# Patient Record
Sex: MALE | Race: WHITE | Employment: FULL TIME | URBAN - METROPOLITAN AREA
[De-identification: names, ages, dates, MRNs, and addresses within clinical notes are randomized per-mention and may not be internally consistent; named-entity substitution may affect disease eponyms.]

---

## 2017-12-13 PROBLEM — E66.9 OBESITY (BMI 30-39.9): Status: ACTIVE | Noted: 2017-12-13

## 2017-12-13 PROBLEM — E78.00 HYPERCHOLESTEROLEMIA: Status: ACTIVE | Noted: 2017-12-13

## 2017-12-13 PROBLEM — I10 ESSENTIAL HYPERTENSION: Status: ACTIVE | Noted: 2017-12-13

## 2018-01-29 ENCOUNTER — APPOINTMENT (OUTPATIENT)
Dept: GENERAL RADIOLOGY | Age: 53
End: 2018-01-29
Attending: ORTHOPAEDIC SURGERY
Payer: COMMERCIAL

## 2018-01-29 ENCOUNTER — ANESTHESIA EVENT (OUTPATIENT)
Dept: SURGERY | Age: 53
End: 2018-01-29
Payer: COMMERCIAL

## 2018-01-29 ENCOUNTER — ANESTHESIA (OUTPATIENT)
Dept: SURGERY | Age: 53
End: 2018-01-29
Payer: COMMERCIAL

## 2018-01-29 ENCOUNTER — HOSPITAL ENCOUNTER (OUTPATIENT)
Age: 53
Setting detail: OUTPATIENT SURGERY
Discharge: HOME | End: 2018-01-29
Attending: ORTHOPAEDIC SURGERY | Admitting: ORTHOPAEDIC SURGERY
Payer: COMMERCIAL

## 2018-01-29 VITALS
WEIGHT: 284.6 LBS | OXYGEN SATURATION: 93 % | TEMPERATURE: 98 F | HEIGHT: 71 IN | SYSTOLIC BLOOD PRESSURE: 104 MMHG | BODY MASS INDEX: 39.84 KG/M2 | HEART RATE: 77 BPM | RESPIRATION RATE: 16 BRPM | DIASTOLIC BLOOD PRESSURE: 58 MMHG

## 2018-01-29 DIAGNOSIS — S62.102A CLOSED FRACTURE OF LEFT WRIST, INITIAL ENCOUNTER: Primary | ICD-10-CM

## 2018-01-29 LAB — GLUCOSE BLD STRIP.AUTO-MCNC: 122 MG/DL (ref 65–100)

## 2018-01-29 PROCEDURE — 77030002933 HC SUT MCRYL J&J -A: Performed by: ORTHOPAEDIC SURGERY

## 2018-01-29 PROCEDURE — 76942 ECHO GUIDE FOR BIOPSY: CPT | Performed by: ORTHOPAEDIC SURGERY

## 2018-01-29 PROCEDURE — C1713 ANCHOR/SCREW BN/BN,TIS/BN: HCPCS | Performed by: ORTHOPAEDIC SURGERY

## 2018-01-29 PROCEDURE — 74011000250 HC RX REV CODE- 250

## 2018-01-29 PROCEDURE — 74011250636 HC RX REV CODE- 250/636

## 2018-01-29 PROCEDURE — 76210000021 HC REC RM PH II 0.5 TO 1 HR: Performed by: ORTHOPAEDIC SURGERY

## 2018-01-29 PROCEDURE — 76010000161 HC OR TIME 1 TO 1.5 HR INTENSV-TIER 1: Performed by: ORTHOPAEDIC SURGERY

## 2018-01-29 PROCEDURE — 77030003602 HC NDL NRV BLK BBMI -B: Performed by: ANESTHESIOLOGY

## 2018-01-29 PROCEDURE — 77030000032 HC CUF TRNQT ZIMM -B: Performed by: ORTHOPAEDIC SURGERY

## 2018-01-29 PROCEDURE — 77030032490 HC SLV COMPR SCD KNE COVD -B: Performed by: ORTHOPAEDIC SURGERY

## 2018-01-29 PROCEDURE — 74011250636 HC RX REV CODE- 250/636: Performed by: ANESTHESIOLOGY

## 2018-01-29 PROCEDURE — 77030002916 HC SUT ETHLN J&J -A: Performed by: ORTHOPAEDIC SURGERY

## 2018-01-29 PROCEDURE — 77030018836 HC SOL IRR NACL ICUM -A: Performed by: ORTHOPAEDIC SURGERY

## 2018-01-29 PROCEDURE — 77030020143 HC AIRWY LARYN INTUB CGAS -A: Performed by: ANESTHESIOLOGY

## 2018-01-29 PROCEDURE — 77030000031 HC BIT DRL QC SYNT -C: Performed by: ORTHOPAEDIC SURGERY

## 2018-01-29 PROCEDURE — 74011250637 HC RX REV CODE- 250/637: Performed by: ANESTHESIOLOGY

## 2018-01-29 PROCEDURE — 82962 GLUCOSE BLOOD TEST: CPT

## 2018-01-29 PROCEDURE — 76010010054 HC POST OP PAIN BLOCK: Performed by: ORTHOPAEDIC SURGERY

## 2018-01-29 PROCEDURE — 74011250636 HC RX REV CODE- 250/636: Performed by: NURSE PRACTITIONER

## 2018-01-29 PROCEDURE — 77030011884 HC TAPE CST PLSTR BSNM -A: Performed by: ORTHOPAEDIC SURGERY

## 2018-01-29 PROCEDURE — 76060000034 HC ANESTHESIA 1.5 TO 2 HR: Performed by: ORTHOPAEDIC SURGERY

## 2018-01-29 PROCEDURE — 73100 X-RAY EXAM OF WRIST: CPT

## 2018-01-29 PROCEDURE — 76210000006 HC OR PH I REC 0.5 TO 1 HR: Performed by: ORTHOPAEDIC SURGERY

## 2018-01-29 PROCEDURE — 77030011640 HC PAD GRND REM COVD -A: Performed by: ORTHOPAEDIC SURGERY

## 2018-01-29 DEVICE — SCREW BNE L20MM DIA2.4MM S STL ST LOK FULL THRD T8 STARDRV: Type: IMPLANTABLE DEVICE | Site: RADIUS | Status: FUNCTIONAL

## 2018-01-29 DEVICE — SCREW BNE L24MM DIA2.4MM S STL ST LOK FULL THRD T8 STARDRV: Type: IMPLANTABLE DEVICE | Site: RADIUS | Status: FUNCTIONAL

## 2018-01-29 DEVICE — SCREW BNE L16MM DIA2.4MM S STL ST LOK FULL THRD T8 STARDRV: Type: IMPLANTABLE DEVICE | Site: RADIUS | Status: FUNCTIONAL

## 2018-01-29 DEVICE — SCREW BNE L22MM DIA2.4MM S STL ST LOK FULL THRD T8 STARDRV: Type: IMPLANTABLE DEVICE | Site: RADIUS | Status: FUNCTIONAL

## 2018-01-29 DEVICE — IMPLANTABLE DEVICE: Type: IMPLANTABLE DEVICE | Site: RADIUS | Status: FUNCTIONAL

## 2018-01-29 RX ORDER — HYDROMORPHONE HYDROCHLORIDE 2 MG/ML
0.5 INJECTION, SOLUTION INTRAMUSCULAR; INTRAVENOUS; SUBCUTANEOUS
Status: DISCONTINUED | OUTPATIENT
Start: 2018-01-29 | End: 2018-01-29 | Stop reason: HOSPADM

## 2018-01-29 RX ORDER — SODIUM CHLORIDE 0.9 % (FLUSH) 0.9 %
5-10 SYRINGE (ML) INJECTION AS NEEDED
Status: DISCONTINUED | OUTPATIENT
Start: 2018-01-29 | End: 2018-01-29 | Stop reason: HOSPADM

## 2018-01-29 RX ORDER — OXYCODONE HYDROCHLORIDE 5 MG/1
10 TABLET ORAL
Status: DISCONTINUED | OUTPATIENT
Start: 2018-01-29 | End: 2018-01-29 | Stop reason: HOSPADM

## 2018-01-29 RX ORDER — PROPOFOL 10 MG/ML
INJECTION, EMULSION INTRAVENOUS AS NEEDED
Status: DISCONTINUED | OUTPATIENT
Start: 2018-01-29 | End: 2018-01-29 | Stop reason: HOSPADM

## 2018-01-29 RX ORDER — SODIUM CHLORIDE, SODIUM LACTATE, POTASSIUM CHLORIDE, CALCIUM CHLORIDE 600; 310; 30; 20 MG/100ML; MG/100ML; MG/100ML; MG/100ML
100 INJECTION, SOLUTION INTRAVENOUS CONTINUOUS
Status: DISCONTINUED | OUTPATIENT
Start: 2018-01-29 | End: 2018-01-29 | Stop reason: HOSPADM

## 2018-01-29 RX ORDER — FENTANYL CITRATE 50 UG/ML
100 INJECTION, SOLUTION INTRAMUSCULAR; INTRAVENOUS ONCE
Status: COMPLETED | OUTPATIENT
Start: 2018-01-29 | End: 2018-01-29

## 2018-01-29 RX ORDER — MIDAZOLAM HYDROCHLORIDE 1 MG/ML
2 INJECTION, SOLUTION INTRAMUSCULAR; INTRAVENOUS
Status: COMPLETED | OUTPATIENT
Start: 2018-01-29 | End: 2018-01-29

## 2018-01-29 RX ORDER — SODIUM CHLORIDE 0.9 % (FLUSH) 0.9 %
5-10 SYRINGE (ML) INJECTION EVERY 8 HOURS
Status: DISCONTINUED | OUTPATIENT
Start: 2018-01-29 | End: 2018-01-29 | Stop reason: HOSPADM

## 2018-01-29 RX ORDER — LIDOCAINE HYDROCHLORIDE 10 MG/ML
0.3 INJECTION INFILTRATION; PERINEURAL ONCE
Status: DISCONTINUED | OUTPATIENT
Start: 2018-01-29 | End: 2018-01-29 | Stop reason: HOSPADM

## 2018-01-29 RX ORDER — FENTANYL CITRATE 50 UG/ML
INJECTION, SOLUTION INTRAMUSCULAR; INTRAVENOUS AS NEEDED
Status: DISCONTINUED | OUTPATIENT
Start: 2018-01-29 | End: 2018-01-29 | Stop reason: HOSPADM

## 2018-01-29 RX ORDER — LIDOCAINE HYDROCHLORIDE 20 MG/ML
INJECTION, SOLUTION EPIDURAL; INFILTRATION; INTRACAUDAL; PERINEURAL AS NEEDED
Status: DISCONTINUED | OUTPATIENT
Start: 2018-01-29 | End: 2018-01-29 | Stop reason: HOSPADM

## 2018-01-29 RX ORDER — CEFAZOLIN SODIUM/WATER 2 G/20 ML
2 SYRINGE (ML) INTRAVENOUS
Status: COMPLETED | OUTPATIENT
Start: 2018-01-29 | End: 2018-01-29

## 2018-01-29 RX ORDER — ONDANSETRON 2 MG/ML
INJECTION INTRAMUSCULAR; INTRAVENOUS AS NEEDED
Status: DISCONTINUED | OUTPATIENT
Start: 2018-01-29 | End: 2018-01-29 | Stop reason: HOSPADM

## 2018-01-29 RX ORDER — ACETAMINOPHEN 500 MG
1000 TABLET ORAL
Status: COMPLETED | OUTPATIENT
Start: 2018-01-29 | End: 2018-01-29

## 2018-01-29 RX ORDER — SODIUM CHLORIDE, SODIUM LACTATE, POTASSIUM CHLORIDE, CALCIUM CHLORIDE 600; 310; 30; 20 MG/100ML; MG/100ML; MG/100ML; MG/100ML
75 INJECTION, SOLUTION INTRAVENOUS
Status: DISCONTINUED | OUTPATIENT
Start: 2018-01-29 | End: 2018-01-29 | Stop reason: HOSPADM

## 2018-01-29 RX ORDER — FAMOTIDINE 20 MG/1
20 TABLET, FILM COATED ORAL ONCE
Status: COMPLETED | OUTPATIENT
Start: 2018-01-29 | End: 2018-01-29

## 2018-01-29 RX ADMIN — Medication 3 G: at 12:49

## 2018-01-29 RX ADMIN — PROPOFOL 350 MG: 10 INJECTION, EMULSION INTRAVENOUS at 12:52

## 2018-01-29 RX ADMIN — ONDANSETRON 4 MG: 2 INJECTION INTRAMUSCULAR; INTRAVENOUS at 13:55

## 2018-01-29 RX ADMIN — MIDAZOLAM HYDROCHLORIDE 2 MG: 1 INJECTION, SOLUTION INTRAMUSCULAR; INTRAVENOUS at 11:18

## 2018-01-29 RX ADMIN — ACETAMINOPHEN 1000 MG: 500 TABLET, FILM COATED ORAL at 14:51

## 2018-01-29 RX ADMIN — FENTANYL CITRATE 25 MCG: 50 INJECTION, SOLUTION INTRAMUSCULAR; INTRAVENOUS at 13:19

## 2018-01-29 RX ADMIN — SODIUM CHLORIDE, SODIUM LACTATE, POTASSIUM CHLORIDE, AND CALCIUM CHLORIDE 100 ML/HR: 600; 310; 30; 20 INJECTION, SOLUTION INTRAVENOUS at 10:39

## 2018-01-29 RX ADMIN — FAMOTIDINE 20 MG: 20 TABLET, FILM COATED ORAL at 10:38

## 2018-01-29 RX ADMIN — FENTANYL CITRATE 100 MCG: 50 INJECTION INTRAMUSCULAR; INTRAVENOUS at 11:19

## 2018-01-29 RX ADMIN — LIDOCAINE HYDROCHLORIDE 100 MG: 20 INJECTION, SOLUTION EPIDURAL; INFILTRATION; INTRACAUDAL; PERINEURAL at 12:52

## 2018-01-29 RX ADMIN — SODIUM CHLORIDE, SODIUM LACTATE, POTASSIUM CHLORIDE, AND CALCIUM CHLORIDE: 600; 310; 30; 20 INJECTION, SOLUTION INTRAVENOUS at 13:48

## 2018-01-29 NOTE — ANESTHESIA POSTPROCEDURE EVALUATION
Post-Anesthesia Evaluation and Assessment    Patient: Ryan Elizabeth MRN: [id number]  SSN: [id number]    YOB: 1965  Age: 47 y.o. Sex: male       Cardiovascular Function/Vital Signs  Visit Vitals    /58    Pulse 77    Temp 36.6 °C (97.8 °F)    Resp 16    Ht 5' 11\" (1.803 m)    Wt 129.1 kg (284 lb 9.6 oz)    SpO2 (!) 88%    BMI 39.69 kg/m2       Patient is status post general anesthesia for Procedure(s):  LEFT 2 PART INTRA-ARTICULAR DISTAL RADIUS OPEN REDUCTION INTERNAL FIXATION/CHOICE. Nausea/Vomiting: None    Postoperative hydration reviewed and adequate. Pain:  Pain Scale 1: Numeric (0 - 10) (01/29/18 1415)  Pain Intensity 1: 0 (01/29/18 1415)   Managed    Neurological Status:   Neuro (WDL): Exceptions to WDL (01/29/18 1415)  Neuro  Neurologic State: Drowsy (01/29/18 1415)  LUE Motor Response: Pharmacologically paralyzed (wiggles fingers slightly, can lift arm) (01/29/18 1415)  LLE Motor Response: Purposeful (01/29/18 1415)  RUE Motor Response: Purposeful (01/29/18 1415)  RLE Motor Response: Purposeful (01/29/18 1415)   At baseline    Mental Status and Level of Consciousness: Alert and oriented     Pulmonary Status:   O2 Device: Nasal cannula (01/29/18 1415)   Adequate oxygenation and airway patent    Complications related to anesthesia: None    Post-anesthesia assessment completed.  No concerns    Signed By: Torito Hopson MD     January 29, 2018

## 2018-01-29 NOTE — IP AVS SNAPSHOT
[address] [address] 
[phone] Patient: Varinder Clifford MRN: [id number] YT About your hospitalization You were admitted on:  2018 You last received care in the:  [facility] PACU You were discharged on:  2018 Why you were hospitalized Your primary diagnosis was:  Not on File Follow-up Information Follow up With Details Comments Contact Info Sarah Isbell MD   [address] [place] [address] [facility] [address] [facility] [address] 
[phone] Facundo Rust MD Follow up on 2018 at 7:40 a.m. [facility] 10 200 [facility] 187 [facility] [address] Discharge Orders Procedure Order Date Status Priority Quantity Spec Type Associated Dx CALL YOUR DOCTOR For: Severe uncontrolled pain. , Redness, tenderness, or signs of infection. 18 125 Normal Routine 1  Closed fracture of left wrist, initial encounter [[id number]] Questions: For:  Severe uncontrolled pain. For:  Redness, tenderness, or signs of infection. ACTIVITY AFTER DISCHARGE Patient should: Restrict weight bearing, Restrict lifting 18 1256 Normal Routine 1  Closed fracture of left wrist, initial encounter [[id number]] Questions: Patient should:  Restrict weight bearing Patient should:  Restrict lifting DIET REGULAR No added salt 18 125 Normal Routine 1  Closed fracture of left wrist, initial encounter [[id number]] Questions: Additional options:  No added salt DRESSING, DO NOT REMOVE 18 125 Normal Routine 1  Closed fracture of left wrist, initial encounter [[id number]] Comments:  Keep clean, dry and intact until next clinic visit. A check issa indicates which time of day the medication should be taken. My Medications CONTINUE taking these medications  Instructions Each Dose to Equal  
 Morning Noon Evening Bedtime  
 atorvastatin 10 mg tablet Commonly known as:  LIPITOR Your last dose was: Your next dose is: Take 1 Tab by mouth daily. 10 mg  
    
   
   
   
  
 esomeprazole 20 mg capsule Commonly known as:  Rafal Savage Your last dose was: Your next dose is: Take 40 mg by mouth daily. 40 mg  
    
   
   
   
  
 fluticasone 50 mcg/actuation nasal spray Commonly known as:  Rutherford Oats Your last dose was: Your next dose is: 2 Sprays by Both Nostrils route daily. 2 [place] FREESTYLE LITE METER Your last dose was: Your next dose is:    
   
   
 by Does Not Apply route. glucosamine-chondroitin 500-400 mg Cap Commonly known as:  20 [facility] Your last dose was: Your next dose is: Take 1 Cap by mouth daily. 1 Cap  
    
   
   
   
  
 glucose blood VI test strips strip Commonly known as:  FREESTYLE LITE STRIPS Your last dose was: Your next dose is:    
   
   
 by Does Not Apply route See Admin Instructions. Check blood sugar twice daily  
     
   
   
   
  
 lancets 28 gauge Misc Commonly known as:  FREESTYLE LANCETS Your last dose was: Your next dose is:    
   
   
 Check twice daily  
     
   
   
   
  
 lisinopril 10 mg tablet Commonly known as:  Solo Arechiga Your last dose was: Your next dose is: Take 1 Tab by mouth daily. 10 mg  
    
   
   
   
  
 metFORMIN  mg tablet Commonly known as:  GLUCOPHAGE XR Your last dose was: Your next dose is: Take 1 Tab by mouth daily (with dinner). 500 mg  
    
   
   
   
  
 potassium 99 mg tablet Your last dose was: Your next dose is: Take 99 mg by mouth daily. 99 mg  
    
   
   
   
  
 venlafaxine-SR 75 mg capsule Commonly known as:  EFFEXOR-XR Your last dose was: Your next dose is: TAKE 3 CAPSULES BY MOUTH DAILY. VITAMIN B-12 1,000 mcg tablet Generic drug:  cyanocobalamin Your last dose was: Your next dose is: Take 1,000 mcg by mouth daily. 1000 mcg Discharge Instructions NON-WEIGHT BEARING LEFT ARM 
NO LIFTING WITH LEFT ARM 
ELEVATE LEFT ARM 
LEFT ARM SLING 
MOVE FINGERS FREELY 
KEEP DRESSING CLEAN AND DRY FOLLOW-UP APPT WITH DR Jarvis Gomez - 2/5/18 @ 7:40 AM 
PRESCRIPTION FOR OXYCODONE 5 MG GIVEN TO PATIENT  
 
 
F-face looks uneven A-arms unable to move or move unevenly S-speech slurred or non-existent T-time-call 911 as soon as signs and symptoms begin-DO NOT go Back to bed or wait to see if you get better-TIME IS BRAIN. Introducing [facility]! Richa Brennan introduces [employer] patient portal. Now you can access parts of your medical record, email your doctor's office, and request medication refills online. 1. In your internet browser, go to [email or web address]. [email or web address] 2. Click on the First Time User? Click Here link in the Sign In box. You will see the New Member Sign Up page. 3. Enter your [employer] Access Code exactly as it appears below.  You will not need to use this code after youve completed the sign-up process. If you do not sign up before the expiration date, you must request a new code. · [employer] Access Code: [email or web address] Expires: 3/13/2018  8:45 AM 
 
4. Enter the last four digits of your Social Security Number (xxxx) and Date of Birth (mm/dd/yyyy) as indicated and click Submit. You will be taken to the next sign-up page. 5. Create a [employer] ID. This will be your [employer] login ID and cannot be changed, so think of one that is secure and easy to remember. 6. Create a [employer] password. You can change your password at any time. 7. Enter your Password Reset Question and Answer. This can be used at a later time if you forget your password. 8. Enter your e-mail address. You will receive e-mail notification when new information is available in [address] 9. Click Sign Up. You can now view and download portions of your medical record. 10. Click the Download Summary menu link to download a portable copy of your medical information. If you have questions, please visit the Frequently Asked Questions section of the [employer] website. Remember, [employer] is NOT to be used for urgent needs. For medical emergencies, dial 911. Now available from your iPhone and Android! Unresulted Labs-Please follow up with your PCP about these lab tests Order Current Status NC XR TECHNOLOGIST SERVICE In process XR WRIST LT AP/LAT In process Providers Seen During Your Hospitalization Provider Specialty Primary office phone Debora Gilman MD Orthopedic Surgery [phone] Your Primary Care Physician (PCP) Primary Care Physician Office Phone Office Fax Benjamen Done [phone] You are allergic to the following No active allergies Recent Documentation Height Weight BMI Smoking Status 1.803 m 129.1 kg 39.69 kg/m2 Never Smoker Emergency Contacts Name Discharge Info Relation Home Work Mobile Augusto Lyles  Spouse [3] [phone] Patient Belongings The following personal items are in your possession at time of discharge: 
  Dental Appliances: None  Visual Aid: Glasses      Home Medications: None   Jewelry: None  Clothing: Shirt, Pants, Footwear, Undergarments    Other Valuables: Eyeglasses Please provide this summary of care documentation to your next provider. Signatures-by signing, you are acknowledging that this After Visit Summary has been reviewed with you and you have received a copy. Patient Signature:  ____________________________________________________________ Date:  ____________________________________________________________  
  
[facility] Provider Signature:  ____________________________________________________________ Date:  ____________________________________________________________

## 2018-01-29 NOTE — BRIEF OP NOTE
BRIEF OPERATIVE NOTE    Date of Procedure: 1/29/2018   Preoperative Diagnosis: Closed extraarticular fracture of distal radius, left, initial encounter [S52.825J]  Postoperative Diagnosis: Left 2 part intra-articular distal radius fracture    Procedure(s):  LEFT 2 PART INTRA-ARTICULAR DISTAL RADIUS OPEN REDUCTION INTERNAL FIXATION  Surgeon(s) and Role:     * Jim Park MD - Primary         Assistant Staff: None      Surgical Staff:  Circ-1: Adolfo Nieves RN  Scrub Tech-1: Babak Dutton  Scrub Tech-2: Mahesh Tomas  Scrub Tech-3: Fly Preciado  Event Time In   Incision Start 1307   Incision Close 1407     Anesthesia: Regional   Estimated Blood Loss: tourniquet  Specimens: * No specimens in log *   Findings: none   Complications: none  Implants:   Implant Name Type Inv.  Item Serial No.  Lot No. LRB No. Used Action   PLATE RAD DSTL 9H HR/5T SHFT/L --  - [id number]  PLATE RAD DSTL 9H OC/5M SHFT/L --   [employer] [place] [id number] Left 1 Implanted   SCR BNE LCK ST STARDRV 2.4X16 -- SS - [id number]  SCR BNE LCK ST STARDRV 2.4X16 -- SS  [employer] [place] [id number] Left 3 Implanted   SCR BNE LCK ST STARDRV 2.4X20 -- SS - [id number]  SCR BNE LCK ST STARDRV 2.4X20 -- SS  SY[employer] [place] [id number] Left 1 Implanted   SCR BNE LCK ST STARDRV 2.4X22 -- SS - [id number]  SCR BNE LCK ST STARDRV 2.4X22 -- SS  SY[employer] [place] [id number] Left 3 Implanted   SCR BNE LCK ST STARDRV 2.4X24 -- SS - [id number]  SCR BNE LCK ST STARDRV 2.4X24 -- SS  SY[employer]TH[employer] [place] [id number] Left 1 Implanted

## 2018-01-29 NOTE — DISCHARGE INSTRUCTIONS
NON-WEIGHT BEARING LEFT ARM  NO LIFTING WITH LEFT ARM  ELEVATE LEFT ARM  LEFT ARM SLING  MOVE FINGERS FREELY  KEEP DRESSING CLEAN AND DRY  FOLLOW-UP APPT WITH DR Lianna Christensen - 2/5/18 @ 7:40 AM  PRESCRIPTION FOR OXYCODONE 5 MG GIVEN TO PATIENT     ACTIVITY  · As tolerated and as directed by your doctor. · Bathe or shower as directed by your doctor. DIET  · Clear liquids until no nausea or vomiting; then light diet for the first day. · Advance to regular diet on second day, unless your doctor orders otherwise. · If nausea and vomiting continues, call your doctor. PAIN  · Take pain medication as directed by your doctor. · Call your doctor if pain is NOT relieved by medication. · DO NOT take aspirin of blood thinners unless directed by your doctor. CALL YOUR DOCTOR IF   · Excessive bleeding that does not stop after holding pressure over the area  · Temperature of 101 degrees F or above  · Excessive redness, swelling or bruising, and/ or green or yellow, smelly discharge from incision    AFTER ANESTHESIA   · For the first 24 hours: DO NOT Drive, Drink alcoholic beverages, or Make important decisions. · Be aware of dizziness following anesthesia and while taking pain medication. After general anesthesia or intravenous sedation, for 24 hours or while taking prescription Narcotics:  · Limit your activities  · Do not drive and operate hazardous machinery  · Do not make important personal or business decisions  · Do  not drink alcoholic beverages  · If you have not urinated within 8 hours after discharge, please contact your surgeon on call. *  Please give a list of your current medications to your Primary Care Provider. *  Please update this list whenever your medications are discontinued, doses are      changed, or new medications (including over-the-counter products) are added. *  Please carry medication information at all times in case of emergency situations.       These are general instructions for a healthy lifestyle:    No smoking/ No tobacco products/ Avoid exposure to second hand smoke    Surgeon General's Warning:  Quitting smoking now greatly reduces serious risk to your health. Obesity, smoking, and sedentary lifestyle greatly increases your risk for illness    A healthy diet, regular physical exercise & weight monitoring are important for maintaining a healthy lifestyle    You may be retaining fluid if you have a history of heart failure or if you experience any of the following symptoms:  Weight gain of 3 pounds or more overnight or 5 pounds in a week, increased swelling in our hands or feet or shortness of breath while lying flat in bed. Please call your doctor as soon as you notice any of these symptoms; do not wait until your next office visit. Recognize signs and symptoms of STROKE:    F-face looks uneven    A-arms unable to move or move unevenly    S-speech slurred or non-existent    T-time-call 911 as soon as signs and symptoms begin-DO NOT go       Back to bed or wait to see if you get better-TIME IS BRAIN.

## 2018-01-29 NOTE — ANESTHESIA PREPROCEDURE EVALUATION
Anesthetic History               Review of Systems / Medical History      Pulmonary        Sleep apnea: CPAP           Neuro/Psych              Cardiovascular    Hypertension              Exercise tolerance: >4 METS     GI/Hepatic/Renal     GERD (on PPI): well controlled           Endo/Other        Obesity     Other Findings              Physical Exam    Airway  Mallampati: III  TM Distance: 4 - 6 cm  Neck ROM: normal range of motion   Mouth opening: Normal     Cardiovascular    Rhythm: regular  Rate: normal         Dental  No notable dental hx       Pulmonary  Breath sounds clear to auscultation               Abdominal         Other Findings            Anesthetic Plan    ASA: 2  Anesthesia type: general      Post-op pain plan if not by surgeon: peripheral nerve block single    Induction: Intravenous  Anesthetic plan and risks discussed with: Patient and Family      Supraclavicular block for postop pain

## 2018-01-29 NOTE — H&P
Outpatient Surgery History and Physical      Tatiana Marrufo was seen and examined. Chief Complaint:   LEFT WRIST PAIN     Physical Exam:   There were no vitals taken for this visit. Heart:   Regular rhythm      Lungs:  Are clear      History:  Past Medical History:   Diagnosis Date    Hoarseness     Laryngopharyngeal reflux     Obstructive sleep apnea on CPAP 7/18/2016      Past Surgical History:   Procedure Laterality Date    HX HEENT      nasal surg    HX HEENT      Sinus Surg x2    HX OTHER SURGICAL      Wrist surgery     Family History   Problem Relation Age of Onset    Cancer Mother     Diabetes Father       Social History     Occupational History    Not on file. Social History Main Topics    Smoking status: Never Smoker    Smokeless tobacco: Never Used    Alcohol use Yes      Comment: occ    Drug use: No    Sexual activity: Not on file       Allergies: Reviewed per EMR  No Known Allergies    Medications:    Prior to Admission medications    Medication Sig Start Date End Date Taking? Authorizing Provider   esomeprazole (NEXIUM) 20 mg capsule Take 40 mg by mouth daily. Historical Provider   lisinopril (PRINIVIL, ZESTRIL) 10 mg tablet Take 1 Tab by mouth daily. 12/13/17   Patricia Ng MD   venlafaxine-[facility]..) 75 mg capsule TAKE 3 CAPSULES BY MOUTH DAILY. 10/8/17   Raya Nathan MD   cyanocobalamin (VITAMIN B-12) 1,000 mcg tablet Take 1,000 mcg by mouth daily. Historical Provider   glucosamine-chondroitin (ARTHX) 500-400 mg cap Take 1 Cap by mouth daily. Historical Provider   metFORMIN ER (GLUCOPHAGE XR) 500 mg tablet Take 1 Tab by mouth daily (with dinner). 7/19/17   Patricia Ng MD   atorvastatin (LIPITOR) 10 mg tablet Take 1 Tab by mouth daily. 7/19/17   Patricia Ng MD   BLOOD-GLUCOSE METER (FREESTYLE LITE METER) by Does Not Apply route.     Historical Provider   lancets (FREESTYLE LANCETS) 28 gauge misc Check twice daily 1/6/17   OLIVIA Waggoner glucose blood VI test strips (FREESTYLE LITE STRIPS) strip by Does Not Apply route See Admin Instructions. Check blood sugar twice daily 1/6/17   OLIVIA Carlson   fluticasone (FLONASE) 50 mcg/actuation nasal spray 2 Sprays by Both Nostrils route daily. 8/18/16   Galina Rodriguez MD   potassium 99 mg tablet Take 99 mg by mouth daily. Historical Provider        The surgery is planned for OPEN REDUCTION INTERNAL FIXATION OF LEFT WRIST          The patient is here today for outpatient surgery. I have examined the patient, no changes are noted in the patient's medical status. Necessity for the procedure/care is still present and the history and physical above is current.       Signed By: Harry Jin NP     January 29, 2018 9:45 AM

## 2018-01-29 NOTE — ANESTHESIA PROCEDURE NOTES
Left supraclavicular block with ultrasound    Start time: 1/29/2018 11:17 AM  End time: 1/29/2018 11:21 AM  Performed by: Agusto Oliver  Authorized by: Agusto Oliver       Pre-procedure:    Indications: at surgeon's request and post-op pain management    Preanesthetic Checklist: patient identified, risks and benefits discussed, site marked, timeout performed, anesthesia consent given and patient being monitored    Timeout Time: 11:17          Block Type:   Block Type:  Supraclavicular  Laterality:  Left  Monitoring:  Continuous pulse ox, frequent vital sign checks, heart rate, oxygen and responsive to questions  Injection Technique:  Single shot  Procedures: ultrasound guided    Patient Position: supine (head elevated 45 degrees)  Prep: chlorhexidine    Location:  Supraclavicular  Needle Type:  Stimuplex  Needle Gauge:  20 G  Needle Localization:  Ultrasound guidance  Medication Injected:  0.5%  ropivacaine  Adds:  Epi 1:200K  Volume (mL):  30    Assessment:  Number of attempts:  1  Injection Assessment:  Incremental injection every 5 mL, local visualized surrounding nerve on ultrasound, negative aspiration for blood, no intravascular symptoms, no paresthesia and ultrasound image on chart  Patient tolerance:  Patient tolerated the procedure well with no immediate complications

## 2018-01-30 NOTE — OP NOTES
[address] [facility]  OPERATIVE REPORT    Daniela Pereira  MR#: [id number]  : 1965  ACCOUNT #: [de-identified]   DATE OF SERVICE: 2018    PREOPERATIVE DIAGNOSIS:  Extraarticular left distal radius fracture. POSTOPERATIVE DIAGNOSIS:  Left 2-part intra-articular distal radius fracture. PROCEDURE PERFORMED:  ORIF LEFT DISTAL RADIUS - 2 PART INTRA-ARTICULAR     SURGEON:  Skip Fleischer, MD     ASSISTANT:  NONE     ANESTHESIA:  General with a block for postoperative pain relief. PROCEDURE:  The patient was brought to the operative suite, placed in the supine position. After adequate anesthesia achieved in the form of a general with a block, patient has a tourniquet applied to the left arm over adequately padded Sof-Rol, it was preset to a level of 250 mmHg. Left upper extremity was then prepped and draped in the usual sterile fashion. It was elevated and exsanguinated by gravity, tourniquet was inflated. Incision is made over the flexor carpi radialis tendon, hemostasis achieved with Bovie cautery. Fascia incised along the line of skin incision. The pronator quadratus and flexor pollicis longus was taken down off the radius and reflected ulnarward and the brachioradialis was released from the radial styloid. What was thought to be an extraarticular fracture actually had a 2-part intra-articular component. The fracture was reduced and provisionally fixed with K-wires. It was then secured with a volarly applied Synthes distal radial locking plate. Plate was secured proximally with a cortical screw pulling the plate to the bone, plate was then secured distally using two 4 locking screws, it was then secured proximally using additional locking screws and the original cortical screw was removed. At this point, AP, lateral and oblique fluoroscopic images confirmed the fracture reduced anatomically and hardware was in good position.   Wound was then irrigated with normal saline and closed in layers. A sterile compressive dressing was applied. Sugar tong splint applied. Tourniquet was deflated and the patient was transferred to the recovery room, alert and oriented under the care of Anesthesia. ESTIMATED BLOOD LOSS:  Minimal.    SPECIMENS REMOVED:  NONE     FLUIDS:  See Anesthesia record. CLOSURE:  Primary. COMPLICATIONS:  None. IMPLANTS:  SEE BRIEF OP NOTE      MD Kaylee Rasheed / Stephanie Shelton  D: 01/29/2018 20:38     T: 01/29/2018 23:33  JOB #: [id number]

## 2018-02-13 ENCOUNTER — HOSPITAL ENCOUNTER (OUTPATIENT)
Dept: PHYSICAL THERAPY | Age: 53
End: 2018-02-13
Payer: COMMERCIAL

## 2018-02-13 NOTE — PROGRESS NOTES
Ambulatory/Rehab Services H2 Model Falls Risk Assessment    Risk Factor Pts. ·   Confusion/Disorientation/Impulsivity  []    4 ·   Symptomatic Depression  []   2 ·   Altered Elimination  []   1 ·   Dizziness/Vertigo  []   1 ·   Gender (Male)  [x]   1 ·   Any administered antiepileptics (anticonvulsants):  []   2 ·   Any administered benzodiazepines:  []   1 ·   Visual Impairment (specify):  []   1 ·   Portable Oxygen Use  []   1 ·   Orthostatic ? BP  []   1 ·   History of Recent Falls (within 3 mos.)  []   5     Ability to Rise from Chair (choose one) Pts. ·   Ability to rise in a single movement  [x]   0 ·   Pushes up, successful in one attempt  []   1 ·   Multiple attempts, but successful  []   3 ·   Unable to rise without assistance  []   4   Total: (5 or greater = High Risk) 1     Falls Prevention Plan:   []                Physical Limitations to Exercise (specify):   []                Mobility Assistance Device (type):   []                Exercise/Equipment Adaptation (specify):    ©2010 [facility] of [address] Patent #[id number].  Federal Law prohibits the replication, distribution or use without written permission from [facility] [employer]

## 2018-02-13 NOTE — THERAPY EVALUATION
Chapo Berlin. : 1965 [id number] [facility],[address] [place] [address].  [phone]   [phone]        OUTPATIENT PHYSICAL THERAPY:Initial Assessment 2018        ICD-10: Treatment Diagnosis: Montemayor's fracture of left radius, sequela (S52.562S)Effusion, left wrist (M25.432)  Precautions/Allergies:   Review of patient's allergies indicates no known allergies. Fall Risk Score: 1 (? 5 = High Risk)  MD Orders: Evaluation and treatment  MEDICAL/REFERRING DIAGNOSIS:  Wrist fracture [S62.109A]   DATE OF ONSET: L radial intra-articular fx with ORIF 18 after foosh injury   REFERRING PHYSICIAN: Earl Fernandez MD  RETURN PHYSICIAN APPOINTMENT: TBD     INITIAL ASSESSMENT:  Mr. Janeth Paz presents with decreased ROM, decreased strength, decreased joint mobility, and pain. His symptoms are consistent with left radial Montemayor fracture with fixation. He has no red flags present at the time of the evaluation and he will benefit form PT services to improve function and decrease pain. PROBLEM LIST (Impacting functional limitations):  1. Decreased Strength  2. Decreased ADL/Functional Activities  3. Increased Pain  4. Decreased Flexibility/Joint Mobility INTERVENTIONS PLANNED:  1. Balance Exercise  2. Cold  3. Cryotherapy  4. Electrical Stimulation  5. Heat  6. Home Exercise Program (HEP)  7. Iontophoresis  8. Manual Therapy  9. Range of Motion (ROM)  10. Therapeutic Activites  11. Therapeutic Exercise/Strengthening   TREATMENT PLAN:  Effective Dates: 18 TO 5/10/18. Frequency/Duration: 2-3 times a week for 12 weeks  GOALS: (Goals have been discussed and agreed upon with patient.)  Short-Term Functional Goals: Time Frame: 2 weeks  1. Patient will be independent with HEP without pain. 2. Patient will reports wrist pain decreased to < 1/10 with pron and sup to 75 deg allowing him to hold objects.    Discharge Goals: Time Frame: 12 weeks  1. Patient will have improved QuickDASH to < 15/55 allowing him to return to improve functional mobility with ADLs  2. Patient will have improve  strength of L UE to 100# allowing him to lift heavy weights without pain,  3. Patient will have improved wrist flex and ext to 65 deg without increased pain allowing him to work without pain. Rehabilitation Potential For Stated Goals: Good  Regarding Josafat Dickson Jr.'s therapy, I certify that the treatment plan above will be carried out by a therapist or under their direction. Thank you for this referral,  Garfield Toth, PT       Referring Physician Signature: Diana Velez MD              Date                      HISTORY:   History of Present Injury/Illness (Reason for Referral):  48 y/o male with c/o left ulnar side wrist pain with active movements. He recently fell and fractured his left distal radius (Montemayor's fx) and had an ORIF on 1/30/18. Past Medical History/Comorbidities:   Mr. Aneudy King  has a past medical history of Hoarseness; Laryngopharyngeal reflux; and Obstructive sleep apnea on CPAP (7/18/2016). He also has no past medical history of Difficult intubation; Heart failure ([facility] [address] Malignant hyperthermia due to anesthesia; Nausea & vomiting; Other ill-defined conditions(799.89); Pseudocholinesterase deficiency; Psychiatric disorder; Unspecified adverse effect of anesthesia; or Unspecified sleep apnea. Mr. Aneudy King  has a past surgical history that includes hx heent; hx heent; and hx other surgical.  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 , Disp: , Rfl:     lisinopril (PRINIVIL, ZESTRIL) 10 mg tablet, Take 1 Tab by mouth daily. , Disp: 30 Tab, Rfl: 12    venlafaxine-SR (EFFEXOR-XR) 75 mg capsule, TAKE 3 CAPSULES BY MOUTH DAILY. , Disp: 90 Cap, Rfl: 10    cyanocobalamin (VITAMIN B-12) 1,000 mcg tablet, Take 1,000 mcg by mouth daily. , Disp: , Rfl:     glucosamine-chondroitin (ARTHX) 500-400 mg cap, Take 1 Cap by mouth daily. , Disp: , Rfl:     metFORMIN ER (GLUCOPHAGE XR) 500 mg tablet, Take 1 Tab by mouth daily (with dinner). , Disp: 60 Tab, Rfl: 12    atorvastatin (LIPITOR) 10 mg tablet, Take 1 Tab by mouth daily. , Disp: 30 Tab, Rfl: 12    BLOOD-GLUCOSE METER (FREESTYLE LITE METER), by Does Not Apply route., Disp: , Rfl:     lancets (FREESTYLE LANCETS) 28 gauge misc, Check twice daily, Disp: 100 Lancet, Rfl: 10    glucose blood VI test strips (FREESTYLE LITE STRIPS) strip, by Does Not Apply route See Admin Instructions. Check blood sugar twice daily, Disp: 100 Strip, Rfl: 10    fluticasone (FLONASE) 50 mcg/actuation nasal spray, 2 Sprays by Both Nostrils route daily. , Disp: 1 Bottle, Rfl: 12    potassium 99 mg tablet, Take 99 mg by mouth daily. , Disp: , Rfl:    Date Last Reviewed:  2/14/2018   # of Personal Factors/Comorbidities that affect the Plan of Care: 1-2: MODERATE COMPLEXITY   EXAMINATION:   Observation/Orthostatic Postural Assessment:          Scar clean and healed with moderate scar restriction   Palpation:          Moderate scar restriction L palmar side of wrist, decreased joint mobility CMC joint and RU joint prx and distal    ROM:     Wrist AROM/PROM  Flexion L 30/45 deg R 65/70 deg  Extension L 20/30 deg R 65/70 deg  Sup L 35/40 deg R 65/70 deg  Pron L 40/50 deg R 70/80 deg   Thumb opposition L 50%  R 75%      Strength:      L2 L 45# R 125#      Special Tests:          N/a  Neurological Screen:        Sensation: light touch intact  Functional Mobility:         Gait/Ambulation:  WNL  Balance:          SLB WNL   Body Structures Involved:  1. Bones  2. Joints  3.  Muscles Body Functions Affected:  1. Neuromusculoskeletal  2. Movement Related Activities and Participation Affected:  1. General Tasks and Demands  2. Mobility  3. Self Care  4. Community, Social and [place]   # of elements that affect the Plan of Care: 3: MODERATE COMPLEXITY   CLINICAL PRESENTATION:   Presentation: Evolving clinical presentation with changing clinical characteristics: MODERATE COMPLEXITY   CLINICAL DECISION MAKING:   Outcome Measure: Tool Used: Disabilities of the Arm, Shoulder and Hand (DASH) Questionnaire - Quick Version  Score:  Initial: 42/55  Most Recent: X/55 (Date: -- )   Interpretation of Score: The DASH is designed to measure the activities of daily living in person's with upper extremity dysfunction or pain. Each section is scored on a 1-5 scale, 5 representing the greatest disability. The scores of each section are added together for a total score of 55. Medical Necessity:   · Patient is expected to demonstrate progress in strength and range of motion to increase independence with functional reach . Reason for Services/Other Comments:  · Patient has been observed to have decreased strength and ROM before, during or after an intervention. Use of outcome tool(s) and clinical judgement create a POC that gives a: Questionable prediction of patient's progress: MODERATE COMPLEXITY   TREATMENT:   (In addition to Assessment/Re-Assessment sessions the following treatments were rendered)  THERAPEUTIC EXERCISE: (see grid for minutes):  Exercises per grid below to improve mobility, strength, balance and coordination. Required moderate visual, verbal, manual and tactile cues to promote proper body alignment, promote proper body posture and promote proper body mechanics. Progressed resistance, range and repetitions as indicated.   MANUAL THERAPY: (see grid for minutes): Joint mobilization, Soft tissue mobilization, Manipulation and Manual lymphatic drainage was utilized and necessary because of the patient's restricted joint motion, painful spasm and loss of articular motion. MODALITIES: (see grid for minutes): *  Electrical Stimulation Therapy (IFC) was provided with intensity adjusted throughout treatment to patient tolerance. to reduce pain       *  Cold Pack Therapy in order to provide analgesia and reduce inflammation and edema. *  Hot Pack Therapy in order to relieve muscle spasm. Date: 2/14/18       Modalities:        paraffin wax L wrist pre stretching                       Therapeutic Exercise: 10 min        Prayer stretch 2x10       Active wrist flex and ext 1 # ball 3x10 each                                        Proprioceptive Activities:                                Manual Therapy: 5 min       PROM and joint distraction PROM supin pronation and distraction with flex and ext               Therapeutic Activities:                                        HEP: Provided HEP handout on 2/14/18  [employer] [facility]  Treatment/Session Assessment:  Demonstrated good teach back . · Pain/ Symptoms: Initial:   3/10 pain at the worst on ulnar side with wrist movements Post Session:  3/10 ·   Compliance with Program/Exercises: Will assess as treatment progresses. · Recommendations/Intent for next treatment session: \"Next visit will focus on advancements to more challenging activities\".   Total Treatment Duration:  PT Patient Time In/Time Out  Time In: 1145  Time Out: [address]

## 2018-02-14 ENCOUNTER — HOSPITAL ENCOUNTER (OUTPATIENT)
Dept: PHYSICAL THERAPY | Age: 53
Discharge: HOME | End: 2018-02-14
Payer: COMMERCIAL

## 2018-02-14 PROCEDURE — 97110 THERAPEUTIC EXERCISES: CPT

## 2018-02-14 PROCEDURE — 97162 PT EVAL MOD COMPLEX 30 MIN: CPT

## 2018-02-16 ENCOUNTER — HOSPITAL ENCOUNTER (OUTPATIENT)
Dept: PHYSICAL THERAPY | Age: 53
End: 2018-02-16
Payer: COMMERCIAL

## 2018-02-20 ENCOUNTER — HOSPITAL ENCOUNTER (OUTPATIENT)
Dept: PHYSICAL THERAPY | Age: 53
Discharge: HOME | End: 2018-02-20
Payer: COMMERCIAL

## 2018-02-21 NOTE — PROGRESS NOTES
Brianna Mcdonald. : 1965 [address] [place] [address].  [phone]   [phone]        OUTPATIENT PHYSICAL THERAPY:Daily Note 2018        ICD-10: Treatment Diagnosis: Montemayor's fracture of left radius, sequela (S52.562S)Effusion, left wrist (M25.432)  Precautions/Allergies:   Review of patient's allergies indicates no known allergies. Fall Risk Score: 1 (? 5 = High Risk)  MD Orders: Evaluation and treatment  MEDICAL/REFERRING DIAGNOSIS:  Wrist fracture [S62.109A]   DATE OF ONSET: L radial intra-articular fx with ORIF 18 after foosh injury   REFERRING PHYSICIAN: Charlene Damon MD  RETURN PHYSICIAN APPOINTMENT: TBD     INITIAL ASSESSMENT:  Mr. Johanny De Leon presents with decreased ROM, decreased strength, decreased joint mobility, and pain. His symptoms are consistent with left radial Montemayor fracture with fixation. He has no red flags present at the time of the evaluation and he will benefit form PT services to improve function and decrease pain. PROBLEM LIST (Impacting functional limitations):  1. Decreased Strength  2. Decreased ADL/Functional Activities  3. Increased Pain  4. Decreased Flexibility/Joint Mobility INTERVENTIONS PLANNED:  1. Balance Exercise  2. Cold  3. Cryotherapy  4. Electrical Stimulation  5. Heat  6. Home Exercise Program (HEP)  7. Iontophoresis  8. Manual Therapy  9. Range of Motion (ROM)  10. Therapeutic Activites  11. Therapeutic Exercise/Strengthening   TREATMENT PLAN:  Effective Dates: 18 TO 5/10/18. Frequency/Duration: 2-3 times a week for 12 weeks  GOALS: (Goals have been discussed and agreed upon with patient.)  Short-Term Functional Goals: Time Frame: 2 weeks  1. Patient will be independent with HEP without pain. 2. Patient will reports wrist pain decreased to < 1/10 with pron and sup to 75 deg allowing him to hold objects. Discharge Goals: Time Frame: 12 weeks  1.  Patient will have improved QuickDASH to < 15/55 allowing him to return to improve functional mobility with ADLs  2. Patient will have improve  strength of L UE to 100# allowing him to lift heavy weights without pain,  3. Patient will have improved wrist flex and ext to 65 deg without increased pain allowing him to work without pain. Rehabilitation Potential For Stated Goals: Good  Regarding Mary Jo Meredith JrDerrell's therapy, I certify that the treatment plan above will be carried out by a therapist or under their direction. Thank you for this referral,  Lowell Ramirez PT       Referring Physician Signature: Colin Jacob MD              Date                      HISTORY:   History of Present Injury/Illness (Reason for Referral):  48 y/o male with c/o left ulnar side wrist pain with active movements. He recently fell and fractured his left distal radius (Montemayor's fx) and had an ORIF on 1/30/18. Past Medical History/Comorbidities:   Mr. Katrina Streeter  has a past medical history of Hoarseness; Laryngopharyngeal reflux; and Obstructive sleep apnea on CPAP (7/18/2016). He also has no past medical history of Difficult intubation; Heart failure ([facility] [address] Malignant hyperthermia due to anesthesia; Nausea & vomiting; Other ill-defined conditions(799.89); Pseudocholinesterase deficiency; Psychiatric disorder; Unspecified adverse effect of anesthesia; or Unspecified sleep apnea. Mr. Katrina Streeter  has a past surgical history that includes hx heent; hx heent; and hx other surgical.  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 , Disp: 6 Tab, Rfl: 0    esomeprazole (NEXIUM) 20 mg capsule, Take 40 mg by mouth daily. , Disp: , Rfl:     lisinopril (PRINIVIL, ZESTRIL) 10 mg tablet, Take 1 Tab by mouth daily. , Disp: 30 Tab, Rfl: 12    venlafaxine-SR (EFFEXOR-XR) 75 mg capsule, TAKE 3 CAPSULES BY MOUTH DAILY. , Disp: 90 Cap, Rfl: 10    cyanocobalamin (VITAMIN B-12) 1,000 mcg tablet, Take 1,000 mcg by mouth daily. , Disp: , Rfl:     glucosamine-chondroitin (ARTHX) 500-400 mg cap, Take 1 Cap by mouth daily. , Disp: , Rfl:     metFORMIN ER (GLUCOPHAGE XR) 500 mg tablet, Take 1 Tab by mouth daily (with dinner). , Disp: 60 Tab, Rfl: 12    atorvastatin (LIPITOR) 10 mg tablet, Take 1 Tab by mouth daily. , Disp: 30 Tab, Rfl: 12    BLOOD-GLUCOSE METER (FREESTYLE LITE METER), by Does Not Apply route., Disp: , Rfl:     lancets (FREESTYLE LANCETS) 28 gauge misc, Check twice daily, Disp: 100 Lancet, Rfl: 10    glucose blood VI test strips (FREESTYLE LITE STRIPS) strip, by Does Not Apply route See Admin Instructions. Check blood sugar twice daily, Disp: 100 Strip, Rfl: 10    fluticasone (FLONASE) 50 mcg/actuation nasal spray, 2 Sprays by Both Nostrils route daily. , Disp: 1 Bottle, Rfl: 12    potassium 99 mg tablet, Take 99 mg by mouth daily. , Disp: , Rfl:    Date Last Reviewed:  2/22/2018   # of Personal Factors/Comorbidities that affect the Plan of Care: 1-2: MODERATE COMPLEXITY   EXAMINATION:   Observation/Orthostatic Postural Assessment:          Scar clean and healed with moderate scar restriction   Palpation:          Moderate scar restriction L palmar side of wrist, decreased joint mobility CMC joint and RU joint prx and distal    ROM:     Wrist AROM/PROM  Flexion L 30/45 deg R 65/70 deg  Extension L 20/30 deg R 65/70 deg  Sup L 35/40 deg R 65/70 deg  Pron L 40/50 deg R 70/80 deg   Thumb opposition L 50%  R 75%      Strength:      L2 L 45# R 125#      Special Tests:          N/a  Neurological Screen:        Sensation: light touch intact  Functional Mobility:         Gait/Ambulation:  WNL  Balance:          SLB WNL Body Structures Involved:  1. Bones  2. Joints  3. Muscles Body Functions Affected:  1. Neuromusculoskeletal  2. Movement Related Activities and Participation Affected:  1. General Tasks and Demands  2. Mobility  3. Self Care  4. Community, Social and [place]   # of elements that affect the Plan of Care: 3: MODERATE COMPLEXITY   CLINICAL PRESENTATION:   Presentation: Evolving clinical presentation with changing clinical characteristics: MODERATE COMPLEXITY   CLINICAL DECISION MAKING:   Outcome Measure: Tool Used: Disabilities of the Arm, Shoulder and Hand (DASH) Questionnaire - Quick Version  Score:  Initial: 42/55  Most Recent: X/55 (Date: -- )   Interpretation of Score: The DASH is designed to measure the activities of daily living in person's with upper extremity dysfunction or pain. Each section is scored on a 1-5 scale, 5 representing the greatest disability. The scores of each section are added together for a total score of 55. Medical Necessity:   · Patient is expected to demonstrate progress in strength and range of motion to increase independence with functional reach . Reason for Services/Other Comments:  · Patient has been observed to have decreased strength and ROM before, during or after an intervention. Use of outcome tool(s) and clinical judgement create a POC that gives a: Questionable prediction of patient's progress: MODERATE COMPLEXITY   TREATMENT:   (In addition to Assessment/Re-Assessment sessions the following treatments were rendered)  THERAPEUTIC EXERCISE: (see grid for minutes):  Exercises per grid below to improve mobility, strength, balance and coordination. Required moderate visual, verbal, manual and tactile cues to promote proper body alignment, promote proper body posture and promote proper body mechanics. Progressed resistance, range and repetitions as indicated.   MANUAL THERAPY: (see grid for minutes): Joint mobilization, Soft tissue mobilization, Manipulation and Manual lymphatic drainage was utilized and necessary because of the patient's restricted joint motion, painful spasm and loss of articular motion. MODALITIES: (see grid for minutes): *  Electrical Stimulation Therapy (IFC) was provided with intensity adjusted throughout treatment to patient tolerance. to reduce pain       *  Cold Pack Therapy in order to provide analgesia and reduce inflammation and edema. *  Hot Pack Therapy in order to relieve muscle spasm. Date: 2/14/18 2/22/18  (visit 2)      Modalities:  5 min       paraffin wax L wrist pre stretching repeat                      Therapeutic Exercise: 10 min  15 min      Prayer stretch 2x10       Active wrist flex and ext 1 # ball 3x10 each        Finger slides  10x      Hook and claw  10x3 each      Cable IR/ER/D1/D2  15x ea 5#              Proprioceptive Activities:                                Manual Therapy: 5 min 25 min      PROM and joint distraction PROM supin pronation and distraction with flex and ext repeat              Therapeutic Activities:                                        HEP: Provided HEP handout on 2/14/18  [employer] [facility]  Treatment/Session Assessment:  He has improve wrist flexion and extension motion and has controlled pain. He continues to have difficulty with full wrist supination and finger flexion and gripping. Con't with POC. · Pain/ Symptoms: Initial:   3/10 pain 'I have been sick so I haven't been doing my exercise much and my hand is stiff\" Post Session:  3/10 No increased pain ·   Compliance with Program/Exercises: Will assess as treatment progresses. · Recommendations/Intent for next treatment session: \"Next visit will focus on advancements to more challenging activities\".   Total Treatment Duration:  PT Patient Time In/Time Out  Time In: 0800  Time Out: [id number] [facility] [place], [place]

## 2018-02-22 ENCOUNTER — HOSPITAL ENCOUNTER (OUTPATIENT)
Dept: PHYSICAL THERAPY | Age: 53
Discharge: HOME | End: 2018-02-22
Payer: COMMERCIAL

## 2018-02-22 PROCEDURE — 97110 THERAPEUTIC EXERCISES: CPT

## 2018-02-22 PROCEDURE — 97140 MANUAL THERAPY 1/> REGIONS: CPT

## 2018-02-28 ENCOUNTER — HOSPITAL ENCOUNTER (OUTPATIENT)
Dept: PHYSICAL THERAPY | Age: 53
Discharge: HOME | End: 2018-02-28
Payer: COMMERCIAL

## 2018-02-28 PROCEDURE — 97110 THERAPEUTIC EXERCISES: CPT

## 2018-02-28 PROCEDURE — 97140 MANUAL THERAPY 1/> REGIONS: CPT

## 2018-02-28 NOTE — PROGRESS NOTES
Zeinab Hernandez. : 1965 [address], [address] [place] [address]  [phone]   [phone]        OUTPATIENT PHYSICAL THERAPY:Daily Note 2018        ICD-10: Treatment Diagnosis: Montemayor's fracture of left radius, sequela (S52.562S)Effusion, left wrist (M25.432)  Precautions/Allergies:   Review of patient's allergies indicates no known allergies. Fall Risk Score: 1 (? 5 = High Risk)  MD Orders: Evaluation and treatment  MEDICAL/REFERRING DIAGNOSIS:  Wrist fracture [S62.109A]   DATE OF ONSET: L radial intra-articular fx with ORIF 18 after foosh injury   REFERRING PHYSICIAN: Alma Elliott MD  RETURN PHYSICIAN APPOINTMENT: TBD     INITIAL ASSESSMENT:  Mr. Amrik Holland presents with decreased ROM, decreased strength, decreased joint mobility, and pain. His symptoms are consistent with left radial Montemayor fracture with fixation. He has no red flags present at the time of the evaluation and he will benefit form PT services to improve function and decrease pain. PROBLEM LIST (Impacting functional limitations):  1. Decreased Strength  2. Decreased ADL/Functional Activities  3. Increased Pain  4. Decreased Flexibility/Joint Mobility INTERVENTIONS PLANNED:  1. Balance Exercise  2. Cold  3. Cryotherapy  4. Electrical Stimulation  5. Heat  6. Home Exercise Program (HEP)  7. Iontophoresis  8. Manual Therapy  9. Range of Motion (ROM)  10. Therapeutic Activites  11. Therapeutic Exercise/Strengthening   TREATMENT PLAN:  Effective Dates: 18 TO 5/10/18. Frequency/Duration: 2-3 times a week for 12 weeks  GOALS: (Goals have been discussed and agreed upon with patient.)  Short-Term Functional Goals: Time Frame: 2 weeks  1. Patient will be independent with HEP without pain. 2. Patient will reports wrist pain decreased to < 1/10 with pron and sup to 75 deg allowing him to hold objects. Discharge Goals: Time Frame: 12 weeks  1.  Patient will have improved QuickDASH to < 15/55 allowing him to return to improve functional mobility with ADLs  2. Patient will have improve  strength of L UE to 100# allowing him to lift heavy weights without pain,  3. Patient will have improved wrist flex and ext to 65 deg without increased pain allowing him to work without pain. Rehabilitation Potential For Stated Goals: Good                HISTORY:   History of Present Injury/Illness (Reason for Referral):  46 y/o male with c/o left ulnar side wrist pain with active movements. He recently fell and fractured his left distal radius (Montemayor's fx) and had an ORIF on 1/30/18. Past Medical History/Comorbidities:   Mr. Khloe Jo  has a past medical history of Hoarseness; Laryngopharyngeal reflux; and Obstructive sleep apnea on CPAP (7/18/2016). He also has no past medical history of Difficult intubation; Heart failure ([facility] [address] Malignant hyperthermia due to anesthesia; Nausea & vomiting; Other ill-defined conditions(799.89); Pseudocholinesterase deficiency; Psychiatric disorder; Unspecified adverse effect of anesthesia; or Unspecified sleep apnea. Mr. Khloe Jo  has a past surgical history that includes hx heent; hx heent; and hx other surgical.  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 , Disp: 6 Tab, Rfl: 0    esomeprazole (NEXIUM) 20 mg capsule, Take 40 mg by mouth daily. , Disp: , Rfl:     lisinopril (PRINIVIL, ZESTRIL) 10 mg tablet, Take 1 Tab by mouth daily. , Disp: 30 Tab, Rfl: 12    venlafaxine-SR (EFFEXOR-XR) 75 mg capsule, TAKE 3 CAPSULES BY MOUTH DAILY. , Disp: 90 Cap, Rfl: 10    cyanocobalamin (VITAMIN B-12) 1,000 mcg tablet, Take 1,000 mcg by mouth daily. , Disp: , Rfl:     glucosamine-chondroitin (ARTHX) 500-400 mg cap, Take 1 Cap by mouth daily. , Disp: , Rfl:     metFORMIN ER (GLUCOPHAGE XR) 500 mg tablet, Take 1 Tab by mouth daily (with dinner). , Disp: 60 Tab, Rfl: 12    atorvastatin (LIPITOR) 10 mg tablet, Take 1 Tab by mouth daily. , Disp: 30 Tab, Rfl: 12    BLOOD-GLUCOSE METER (FREESTYLE LITE METER), by Does Not Apply route., Disp: , Rfl:     lancets (FREESTYLE LANCETS) 28 gauge misc, Check twice daily, Disp: 100 Lancet, Rfl: 10    glucose blood VI test strips (FREESTYLE LITE STRIPS) strip, by Does Not Apply route See Admin Instructions. Check blood sugar twice daily, Disp: 100 Strip, Rfl: 10    fluticasone (FLONASE) 50 mcg/actuation nasal spray, 2 Sprays by Both Nostrils route daily. , Disp: 1 Bottle, Rfl: 12    potassium 99 mg tablet, Take 99 mg by mouth daily. , Disp: , Rfl:    Date Last Reviewed:  2/28/2018   EXAMINATION:   Observation/Orthostatic Postural Assessment:          Scar clean and healed with moderate scar restriction   Palpation:          Moderate scar restriction L palmar side of wrist, decreased joint mobility CMC joint and RU joint prx and distal    ROM:     Wrist AROM/PROM  Flexion L 30/45 deg R 65/70 deg  Extension L 20/30 deg R 65/70 deg  Sup L 35/40 deg R 65/70 deg  Pron L 40/50 deg R 70/80 deg   Thumb opposition L 50%  R 75%      Strength:      L2 L 45# R 125#      Special Tests:          N/a  Neurological Screen:        Sensation: light touch intact  Functional Mobility:         Gait/Ambulation:  WNL  Balance:          SLB WNL   Body Structures Involved:  1. Bones  2. Joints  3. Muscles Body Functions Affected:  1. Neuromusculoskeletal  2. Movement Related Activities and Participation Affected:  1. General Tasks and Demands  2. Mobility  3. Self Care  4. Community, Social and Civic Life   CLINICAL PRESENTATION:   CLINICAL DECISION MAKING:   Outcome Measure:    Tool Used: Disabilities of the Arm, Shoulder and Hand (DASH) Questionnaire - Quick Version  Score:  Initial: 42/55  Most Recent: X/55 (Date: -- )   Interpretation of Score: The DASH is designed to measure the activities of daily living in person's with upper extremity dysfunction or pain. Each section is scored on a 1-5 scale, 5 representing the greatest disability. The scores of each section are added together for a total score of 55. Medical Necessity:   · Patient is expected to demonstrate progress in strength and range of motion to increase independence with functional reach . Reason for Services/Other Comments:  · Patient has been observed to have decreased strength and ROM before, during or after an intervention. TREATMENT:   (In addition to Assessment/Re-Assessment sessions the following treatments were rendered)  THERAPEUTIC EXERCISE: (see grid for minutes):  Exercises per grid below to improve mobility, strength, balance and coordination. Required moderate visual, verbal, manual and tactile cues to promote proper body alignment, promote proper body posture and promote proper body mechanics. Progressed resistance, range and repetitions as indicated. MANUAL THERAPY: (see grid for minutes): Joint mobilization, Soft tissue mobilization, Manipulation and Manual lymphatic drainage was utilized and necessary because of the patient's restricted joint motion, painful spasm and loss of articular motion. MODALITIES: (see grid for minutes): *  Electrical Stimulation Therapy (IFC) was provided with intensity adjusted throughout treatment to patient tolerance. to reduce pain       *  Cold Pack Therapy in order to provide analgesia and reduce inflammation and edema. *  Hot Pack Therapy in order to relieve muscle spasm.      Date: 2/14/18 2/22/18  (visit 2) 2/28/18  (visit 3)     Modalities:  5 min       paraffin wax L wrist pre stretching repeat                      Therapeutic Exercise: 10 min  15 min 20 min     Prayer stretch 2x10 Active wrist flex and ext 1 # ball 3x10 each        Finger slides  10x      Hook and claw  10x3 each      Cable IR/ER/D1/D2  15x ea 5# RTB ER/IR 3x10 ea     UBE   3' L4     D1/D2   4# ball 3x10 ea     scaption   4$ 3x10      Biceps curls   8# 3x10              Proprioceptive Activities:                                Manual Therapy: 5 min 25 min 25 mins     PROM and joint distraction PROM supin pronation and distraction with flex and ext repeat Repeat and IASTM to scar             Therapeutic Activities:                                        HEP: Provided HEP handout on 2/14/18  [employer] Portal  Treatment/Session Assessment:  He is making steady progress with range and  strength. He has good carryover between sessions with wrist supin/ext/pronation ROM. Con't with POC. · Pain/ Symptoms: Initial:   1/10 pain 'My wrist is stiff when I first wake up in the morning and it feels better after I do the exercises. My knee is hurting worse and I am starting to be more concerned with  my knee pain\" 0/10 No increased pain ·   Compliance with Program/Exercises: Will assess as treatment progresses. · Recommendations/Intent for next treatment session: \"Next visit will focus on advancements to more challenging activities\".   Total Treatment Duration:  PT Patient Time In/Time Out  Time In: 0800  Time Out: [id number] [facility] Ximena, PT

## 2018-03-02 ENCOUNTER — APPOINTMENT (OUTPATIENT)
Dept: PHYSICAL THERAPY | Age: 53
End: 2018-03-02
Payer: COMMERCIAL

## 2018-03-05 ENCOUNTER — APPOINTMENT (OUTPATIENT)
Dept: PHYSICAL THERAPY | Age: 53
End: 2018-03-05
Payer: COMMERCIAL

## 2018-03-07 ENCOUNTER — HOSPITAL ENCOUNTER (OUTPATIENT)
Dept: PHYSICAL THERAPY | Age: 53
Discharge: HOME | End: 2018-03-07
Payer: COMMERCIAL

## 2018-03-07 PROCEDURE — 97110 THERAPEUTIC EXERCISES: CPT

## 2018-03-07 PROCEDURE — 97140 MANUAL THERAPY 1/> REGIONS: CPT

## 2018-03-07 NOTE — PROGRESS NOTES
Carola Wagner. : 1965 [address] [facility]  [address] [place] [address].  [phone]   [phone]        OUTPATIENT PHYSICAL THERAPY:Daily Note 3/7/2018        ICD-10: Treatment Diagnosis: Montemayor's fracture of left radius, sequela (S52.562S)Effusion, left wrist (M25.432)  Precautions/Allergies:   Review of patient's allergies indicates no known allergies. Fall Risk Score: 1 (? 5 = High Risk)  MD Orders: Evaluation and treatment  MEDICAL/REFERRING DIAGNOSIS:  Wrist fracture [S62.109A]   DATE OF ONSET: L radial intra-articular fx with ORIF 18 after foosh injury   REFERRING PHYSICIAN: Andrea Agustin MD  RETURN PHYSICIAN APPOINTMENT: TBD     INITIAL ASSESSMENT:  Mr. Jimena Nava presents with decreased ROM, decreased strength, decreased joint mobility, and pain. His symptoms are consistent with left radial Montemayor fracture with fixation. He has no red flags present at the time of the evaluation and he will benefit form PT services to improve function and decrease pain. PROBLEM LIST (Impacting functional limitations):  1. Decreased Strength  2. Decreased ADL/Functional Activities  3. Increased Pain  4. Decreased Flexibility/Joint Mobility INTERVENTIONS PLANNED:  1. Balance Exercise  2. Cold  3. Cryotherapy  4. Electrical Stimulation  5. Heat  6. Home Exercise Program (HEP)  7. Iontophoresis  8. Manual Therapy  9. Range of Motion (ROM)  10. Therapeutic Activites  11. Therapeutic Exercise/Strengthening   TREATMENT PLAN:  Effective Dates: 18 TO 5/10/18. Frequency/Duration: 2-3 times a week for 12 weeks  GOALS: (Goals have been discussed and agreed upon with patient.)  Short-Term Functional Goals: Time Frame: 2 weeks  1. Patient will be independent with HEP without pain. 2. Patient will reports wrist pain decreased to < 1/10 with pron and sup to 75 deg allowing him to hold objects. Discharge Goals: Time Frame: 12 weeks  1.  Patient will have improved QuickDASH to < 15/55 allowing him to return to improve functional mobility with ADLs  2. Patient will have improve  strength of L UE to 100# allowing him to lift heavy weights without pain,  3. Patient will have improved wrist flex and ext to 65 deg without increased pain allowing him to work without pain. Rehabilitation Potential For Stated Goals: Good                HISTORY:   History of Present Injury/Illness (Reason for Referral):  46 y/o male with c/o left ulnar side wrist pain with active movements. He recently fell and fractured his left distal radius (Montemayor's fx) and had an ORIF on 1/30/18. Past Medical History/Comorbidities:   Mr. Quinn Eng  has a past medical history of Hoarseness; Laryngopharyngeal reflux; and Obstructive sleep apnea on CPAP (7/18/2016). He also has no past medical history of Difficult intubation; Heart failure ([facility] [address] Malignant hyperthermia due to anesthesia; Nausea & vomiting; Other ill-defined conditions(799.89); Pseudocholinesterase deficiency; Psychiatric disorder; Unspecified adverse effect of anesthesia; or Unspecified sleep apnea. Mr. Quinn Eng  has a past surgical history that includes hx heent; hx heent; and hx other surgical.  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 , Disp: 6 Tab, Rfl: 0    esomeprazole (NEXIUM) 20 mg capsule, Take 40 mg by mouth daily. , Disp: , Rfl:     lisinopril (PRINIVIL, ZESTRIL) 10 mg tablet, Take 1 Tab by mouth daily. , Disp: 30 Tab, Rfl: 12    venlafaxine-SR (EFFEXOR-XR) 75 mg capsule, TAKE 3 CAPSULES BY MOUTH DAILY. , Disp: 90 Cap, Rfl: 10    cyanocobalamin (VITAMIN B-12) 1,000 mcg tablet, Take 1,000 mcg by mouth daily. , Disp: , Rfl:     glucosamine-chondroitin (ARTHX) 500-400 mg cap, Take 1 Cap by mouth daily. , Disp: , Rfl:     metFORMIN ER (GLUCOPHAGE XR) 500 mg tablet, Take 1 Tab by mouth daily (with dinner). , Disp: 60 Tab, Rfl: 12    atorvastatin (LIPITOR) 10 mg tablet, Take 1 Tab by mouth daily. , Disp: 30 Tab, Rfl: 12    BLOOD-GLUCOSE METER (FREESTYLE LITE METER), by Does Not Apply route., Disp: , Rfl:     lancets (FREESTYLE LANCETS) 28 gauge misc, Check twice daily, Disp: 100 Lancet, Rfl: 10    glucose blood VI test strips (FREESTYLE LITE STRIPS) strip, by Does Not Apply route See Admin Instructions. Check blood sugar twice daily, Disp: 100 Strip, Rfl: 10    fluticasone (FLONASE) 50 mcg/actuation nasal spray, 2 Sprays by Both Nostrils route daily. , Disp: 1 Bottle, Rfl: 12    potassium 99 mg tablet, Take 99 mg by mouth daily. , Disp: , Rfl:    Date Last Reviewed:  3/7/2018   EXAMINATION:   Observation/Orthostatic Postural Assessment:          Scar clean and healed with moderate scar restriction   Palpation:          Moderate scar restriction L palmar side of wrist, decreased joint mobility CMC joint and RU joint prx and distal    ROM:     Wrist AROM/PROM  Flexion L 30/45 deg R 65/70 deg  Extension L 20/30 deg R 65/70 deg  Sup L 35/40 deg R 65/70 deg  Pron L 40/50 deg R 70/80 deg   Thumb opposition L 50%  R 75%      Strength:      L2 L 45# R 125#      Special Tests:          N/a  Neurological Screen:        Sensation: light touch intact  Functional Mobility:         Gait/Ambulation:  WNL  Balance:          SLB WNL   Body Structures Involved:  1. Bones  2. Joints  3. Muscles Body Functions Affected:  1. Neuromusculoskeletal  2. Movement Related Activities and Participation Affected:  1. General Tasks and Demands  2. Mobility  3. Self Care  4. Community, Social and Civic Life   CLINICAL PRESENTATION:   CLINICAL DECISION MAKING:   Outcome Measure:    Tool Used: Disabilities of the Arm, Shoulder and Hand (DASH) Questionnaire - Quick Version  Score:  Initial: 42/55  Most Recent: X/55 (Date: -- )   Interpretation of Score: The DASH is designed to measure the activities of daily living in person's with upper extremity dysfunction or pain. Each section is scored on a 1-5 scale, 5 representing the greatest disability. The scores of each section are added together for a total score of 55. Medical Necessity:   · Patient is expected to demonstrate progress in strength and range of motion to increase independence with functional reach . Reason for Services/Other Comments:  · Patient has been observed to have decreased strength and ROM before, during or after an intervention. TREATMENT:   (In addition to Assessment/Re-Assessment sessions the following treatments were rendered)  THERAPEUTIC EXERCISE: (see grid for minutes):  Exercises per grid below to improve mobility, strength, balance and coordination. Required moderate visual, verbal, manual and tactile cues to promote proper body alignment, promote proper body posture and promote proper body mechanics. Progressed resistance, range and repetitions as indicated. MANUAL THERAPY: (see grid for minutes): Joint mobilization, Soft tissue mobilization, Manipulation and Manual lymphatic drainage was utilized and necessary because of the patient's restricted joint motion, painful spasm and loss of articular motion. MODALITIES: (see grid for minutes): *  Electrical Stimulation Therapy (IFC) was provided with intensity adjusted throughout treatment to patient tolerance. to reduce pain       *  Cold Pack Therapy in order to provide analgesia and reduce inflammation and edema. *  Hot Pack Therapy in order to relieve muscle spasm.      Date: 2/14/18 2/22/18  (visit 2) 2/28/18  (visit 3) 3/07/18 (visit 4)    Modalities:  5 min       paraffin wax L wrist pre stretching repeat                      Therapeutic Exercise: 10 min  15 min 20 min 30 min Prayer stretch 2x10       Active wrist flex and ext 1 # ball 3x10 each    Flex and ext 3x10, 4#    Finger slides  10x      Hook and claw  10x3 each      Cable IR/ER/D1/D2  15x ea 5# RTB ER/IR 3x10 ea     UBE   3' L4 L3 6 min forward/reverse    D1/D2   4# ball 3x10 ea     scaption   4$ 3x10  2x10 flexion and scaption 4#    Pronation/supination    3x10 4# each    Biceps curls   8# 3x10      Wrist radial and ulnar deviation    3x10, 4#    Proprioceptive Activities:                                Manual Therapy: 5 min 25 min 25 mins 15 min    PROM and joint distraction PROM supin pronation and distraction with flex and ext repeat Repeat and IASTM to scar PROM and scar massage            Therapeutic Activities:                                        HEP: Provided HEP handout on 2/14/18  [employer] Portal  Treatment/Session Assessment: Pt reported no pain but wrist flexion and extension are limited. Pt states that the knee is his main concern. Con't with POC. · Pain/ Symptoms: Initial: 0/10 L wrist    Pt states \"The wrist is just stiff. \" 0/10 No increased pain ·   Compliance with Program/Exercises: Will assess as treatment progresses. · Recommendations/Intent for next treatment session: \"Next visit will focus on advancements to more challenging activities\".   Total Treatment Duration:  PT Patient Time In/Time Out  Time In: 0800  Time Out: 101 [facility]

## 2018-03-09 ENCOUNTER — APPOINTMENT (OUTPATIENT)
Dept: PHYSICAL THERAPY | Age: 53
End: 2018-03-09
Payer: COMMERCIAL

## 2018-03-12 ENCOUNTER — APPOINTMENT (OUTPATIENT)
Dept: PHYSICAL THERAPY | Age: 53
End: 2018-03-12
Payer: COMMERCIAL

## 2018-03-14 ENCOUNTER — HOSPITAL ENCOUNTER (OUTPATIENT)
Dept: PHYSICAL THERAPY | Age: 53
Discharge: HOME | End: 2018-03-14
Payer: COMMERCIAL

## 2018-03-14 PROCEDURE — 97140 MANUAL THERAPY 1/> REGIONS: CPT

## 2018-03-14 PROCEDURE — 97164 PT RE-EVAL EST PLAN CARE: CPT

## 2018-03-14 PROCEDURE — 97110 THERAPEUTIC EXERCISES: CPT

## 2018-03-14 NOTE — PROGRESS NOTES
Ellen Duran. : 1965 [address]  [address] [place] [address].  [phone]   [phone]        OUTPATIENT PHYSICAL THERAPY:Daily Note and Re-evaluation 3/14/2018        ICD-10: Treatment Diagnosis: Montemayor's fracture of left radius, sequela (S52.562S)Effusion, left wrist (M25.432) Pain in left knee (M25.562)Other abnormalities of gait and mobility (R26.89)  Precautions/Allergies:   Review of patient's allergies indicates no known allergies. Fall Risk Score: 1 (? 5 = High Risk)  MD Orders: Evaluation and treatment  MEDICAL/REFERRING DIAGNOSIS:  Wrist fracture [S62.109A]   DATE OF ONSET: L radial intra-articular fx with ORIF 18 after foosh injury   REFERRING PHYSICIAN: Marco A Jeronimo MD  RETURN PHYSICIAN APPOINTMENT: TBD     INITIAL ASSESSMENT:  Mr. Mirela Saucedo presents with decreased ROM, decreased strength, decreased joint mobility, and pain. His symptoms are consistent with left radial Montemayor fracture with fixation. He has no red flags present at the time of the evaluation and he will benefit form PT services to improve function and decrease pain. Re-certification on : Mr. Mirela Saucedo is making excellent progress with his left wrist improved AROM WNL and improving strength. He is under the care of Dr. Garrett Candelaria MD (who recently referred him to PT) for insidious onset of left knee pain that began when he was climbing down a ladder and hyper-flexed his knee when he slipped down the latter. He reports an immediate pop and swelling. His swelling has improved but he continues to report medial joint line tenderness and pain that is worse after sitting at the movies and bending his knee. His symptoms are consistent with PFPS and he will ebenfit from additional PT with focus on left knee muscle imbalance and left wrist range of motion and strength.            PROBLEM LIST (Impacting functional limitations):  1. Decreased Strength  2. Decreased ADL/Functional Activities  3. Increased Pain  4. Decreased Flexibility/Joint Mobility INTERVENTIONS PLANNED:  1. Balance Exercise  2. Cold  3. Cryotherapy  4. Electrical Stimulation  5. Heat  6. Home Exercise Program (HEP)  7. Iontophoresis  8. Manual Therapy  9. Range of Motion (ROM)  10. Therapeutic Activites  11. Therapeutic Exercise/Strengthening   TREATMENT PLAN:  Effective Dates: 3/14/18 to 6/09/18  Frequency/Duration: 2-3 times a week for 12 weeks  GOALS: (Goals have been discussed and agreed upon with patient.)  Short-Term Functional Goals: Time Frame: 2 weeks  1. Patient will be independent with HEP without pain.(MET)  2. Patient will reports wrist pain decreased to < 1/10 with pron and sup to 75 deg allowing him to hold objects. (MET)  3. Patient will have improved knee extension to 0 degrees AROM allowing him to ambulate without antalgia. (new 3/14/18)  Discharge Goals: Time Frame: 12 weeks  1. Patient will have improved QuickDASH to < 15/55 allowing him to return to improve functional mobility with ADLs (progressing)  2. Patient will have improve  strength of L UE to 100# allowing him to lift heavy weights without pain,(progressing)  3. Patient will have improved wrist flex and ext to 65 deg without increased pain allowing him to work without pain. (progressing)  4. Patient will have improved LE mmt to 5/5 allowing him to ascend and descend stairs without pain. 5. Patient will have improved pain to < 1/10 allowing him to sit for > 2 hours without increased pain. Rehabilitation Potential For Stated Goals: Good    Regarding Anamika Jenkins Jr.'s therapy, I certify that the treatment plan above will be carried out by a therapist or under their direction.   Thank you for this referral,  Galina Chavez PT     Referring Physician Signature: Daniel Hickey MD          Date                          HISTORY:   History of Present Injury/Illness (Reason for Referral):  46 y/o male with c/o left ulnar side wrist pain with active movements. He recently fell and fractured his left distal radius (Montemayor's fx) and had an ORIF on 1/30/18. He also has c/o of left knee pain taht started after he slipped on a step and hyper-flexed his knee and heard a pop 2-3 mos ago. His knee pain is not improved much and causing him to limp and hurts after sitting and bending his knee. His pain is 8/10 at the worst and some days it feels like it is catching. His MD suspects mensicus shyla but they have not done an MRI yet and want to see if PT will help with his pain   Past Medical History/Comorbidities:   Mr. Aditi Ayers  has a past medical history of Hoarseness; Laryngopharyngeal reflux; and Obstructive sleep apnea on CPAP (7/18/2016). He also has no past medical history of Difficult intubation; Heart failure ([facility] [address] Malignant hyperthermia due to anesthesia; Nausea & vomiting; Other ill-defined conditions(799.89); Pseudocholinesterase deficiency; Psychiatric disorder; Unspecified adverse effect of anesthesia; or Unspecified sleep apnea. Mr. Aditi Ayers  has a past surgical history that includes hx heent; hx heent; and hx other surgical.  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 , Disp: 6 Tab, Rfl: 0    esomeprazole (NEXIUM) 20 mg capsule, Take 40 mg by mouth daily. , Disp: , Rfl:     lisinopril (PRINIVIL, ZESTRIL) 10 mg tablet, Take 1 Tab by mouth daily. , Disp: 30 Tab, Rfl: 12    venlafaxine-SR (EFFEXOR-XR) 75 mg capsule, TAKE 3 CAPSULES BY MOUTH DAILY. , Disp: 90 Cap, Rfl: 10    cyanocobalamin (VITAMIN B-12) 1,000 mcg tablet, Take 1,000 mcg by mouth daily. , Disp: , Rfl:     glucosamine-chondroitin (ARTHX) 500-400 mg cap, Take 1 Cap by mouth daily. , Disp: , Rfl:     metFORMIN ER (GLUCOPHAGE XR) 500 mg tablet, Take 1 Tab by mouth daily (with dinner). , Disp: 60 Tab, Rfl: 12    atorvastatin (LIPITOR) 10 mg tablet, Take 1 Tab by mouth daily. , Disp: 30 Tab, Rfl: 12    BLOOD-GLUCOSE METER (FREESTYLE LITE METER), by Does Not Apply route., Disp: , Rfl:     lancets (FREESTYLE LANCETS) 28 gauge misc, Check twice daily, Disp: 100 Lancet, Rfl: 10    glucose blood VI test strips (FREESTYLE LITE STRIPS) strip, by Does Not Apply route See Admin Instructions. Check blood sugar twice daily, Disp: 100 Strip, Rfl: 10    fluticasone (FLONASE) 50 mcg/actuation nasal spray, 2 Sprays by Both Nostrils route daily. , Disp: 1 Bottle, Rfl: 12    potassium 99 mg tablet, Take 99 mg by mouth daily. , Disp: , Rfl:    Date Last Reviewed:  3/14/2018   EXAMINATION:   Observation/Orthostatic Postural Assessment:          Scar clean and healed with moderate scar restriction   Palpation:          Moderate scar restriction L palmar side of wrist, decreased joint mobility CMC joint and RU joint prx and distal          3/14/18 minimal scar tissue restriction L wrist incision, hypomobile palmar carpal glides without pain, triggerpoint and pain L VL and popliteus   ROM:     Wrist AROM/PROM  Flexion L 30/45 deg R 65/70 deg  Extension L 20/30 deg R 65/70 deg  Sup L 35/40 deg R 65/70 deg  Pron L 40/50 deg R 70/80 deg   Thumb opposition L 50%  R 75%   3/14/18 wrist AROM/PROM  Extension L 60/75 deg  Flexion 45/50 deg  pron and supination AROM 75 deg    Knee AROM/PROM  L knee extension -10/-5 (pain with overpressure) and flexion 120/135    Strength:      L2 L 45# R 125#   Hip abduction L 3+/5  Knee extension L 4-/5  Knee flexion L 4-/5   Special Tests:          3/14/18 Thessleys and McMurrays positive L LE, L medial joint line tenderness, quad contraction with straight leg painful L LE  Neurological Screen:        Sensation: light touch intact  Functional Mobility:         Gait/Ambulation:  WNL  Balance:          SLB WNL   Body Structures Involved:  1. Bones  2. Joints  3. Muscles Body Functions Affected:  1. Neuromusculoskeletal  2. Movement Related Activities and Participation Affected:  1. General Tasks and Demands  2. Mobility  3. Self Care  4. Community, Social and Civic Life   CLINICAL PRESENTATION:   CLINICAL DECISION MAKING:   Outcome Measure: Tool Used: Disabilities of the Arm, Shoulder and Hand (DASH) Questionnaire - Quick Version  Score:  Initial: 42/55  Most Recent: X/55 (Date: -- )   Interpretation of Score: The DASH is designed to measure the activities of daily living in person's with upper extremity dysfunction or pain. Each section is scored on a 1-5 scale, 5 representing the greatest disability. The scores of each section are added together for a total score of 55. Medical Necessity:   · Patient is expected to demonstrate progress in strength and range of motion to increase independence with functional reach . Reason for Services/Other Comments:  · Patient has been observed to have decreased strength and ROM before, during or after an intervention. TREATMENT:   (In addition to Assessment/Re-Assessment sessions the following treatments were rendered)  THERAPEUTIC EXERCISE: (see grid for minutes):  Exercises per grid below to improve mobility, strength, balance and coordination. Required moderate visual, verbal, manual and tactile cues to promote proper body alignment, promote proper body posture and promote proper body mechanics. Progressed resistance, range and repetitions as indicated. MANUAL THERAPY: (see grid for minutes): Joint mobilization, Soft tissue mobilization, Manipulation and Manual lymphatic drainage was utilized and necessary because of the patient's restricted joint motion, painful spasm and loss of articular motion.    MODALITIES: (see grid for minutes): *  Electrical Stimulation Therapy (IFC) was provided with intensity adjusted throughout treatment to patient tolerance. to reduce pain       *  Cold Pack Therapy in order to provide analgesia and reduce inflammation and edema. *  Hot Pack Therapy in order to relieve muscle spasm. Date: 2/14/18 2/22/18  (visit 2) 2/28/18  (visit 3) 3/07/18 (visit 4) 3/14/18  (visit 5)   Modalities:  5 min    15 mins   paraffin wax L wrist pre stretching repeat   Re-cert charge for knee referral 12 mins                    Therapeutic Exercise: 10 min  15 min 20 min 30 min 15 mins   Prayer stretch 2x10       Active wrist flex and ext 1 # ball 3x10 each    Flex and ext 3x10, 4#    Finger slides  10x      Hook and claw  10x3 each      Cable IR/ER/D1/D2  15x ea 5# RTB ER/IR 3x10 ea     UBE   3' L4 L3 6 min forward/reverse    D1/D2   4# ball 3x10 ea     scaption   4$ 3x10  2x10 flexion and scaption 4#    Pronation/supination    3x10 4# each    Biceps curls   8# 3x10      SLR and SAQ     3x10 L LE   Prone HSC and quad stretch     3x10 / 3x30 sec holds   Standing HR     3x10    HS stretch with strap     3x30 sec hodls   Wrist radial and ulnar deviation    3x10, 4#    Proprioceptive Activities:                                Manual Therapy: 5 min 25 min 25 mins 15 min 20 mins   PROM and joint distraction L wrist PROM supin pronation and distraction with flex and ext repeat Repeat and IASTM to scar PROM and scar massage Left CMC distraction with end range extension, scar massage left wrist   Knee FDN/MFR     L VL and popliteus muscle    Therapeutic Activities:                                        HEP: Provided HEP handout on 2/14/18  [employer] Portal  Treatment/Session Assessment: Patient reported pain relief in knee after FDN to VL and popliteus muscle. He demonstrated good teach back with HEP for left knee strengthening and stretching.  He has improved wrist AROM that improved after some gentle distraction with end range extension. Con't with updated POC with focus on controlling L PFPS but monitoring left wrist ROM and updating HEP for wrist and knee as needed. · Pain/ Symptoms: Initial: 5/10 L knee pain with bending and walking     Pt states \"My wrist is sore because I am using it a lot of work to pull myself up into the truck and drive but the soreness goes away the next day. My knee has been hurting fairly bad, my doctor wrote me a script to have PT for my knee\" 2/10 after session with walking and bending knee ·   Compliance with Program/Exercises: Will assess as treatment progresses. · Recommendations/Intent for next treatment session: \"Next visit will focus on advancements to more challenging activities\".   Total Treatment Duration:  PT Patient Time In/Time Out  Time In: 0930  Time Out: Terence 19, PT

## 2018-03-16 ENCOUNTER — APPOINTMENT (OUTPATIENT)
Dept: PHYSICAL THERAPY | Age: 53
End: 2018-03-16
Payer: COMMERCIAL

## 2018-03-19 ENCOUNTER — APPOINTMENT (OUTPATIENT)
Dept: PHYSICAL THERAPY | Age: 53
End: 2018-03-19
Payer: COMMERCIAL

## 2018-03-21 ENCOUNTER — HOSPITAL ENCOUNTER (OUTPATIENT)
Dept: PHYSICAL THERAPY | Age: 53
Discharge: HOME | End: 2018-03-21
Payer: COMMERCIAL

## 2018-03-23 ENCOUNTER — APPOINTMENT (OUTPATIENT)
Dept: PHYSICAL THERAPY | Age: 53
End: 2018-03-23
Payer: COMMERCIAL

## 2018-03-26 ENCOUNTER — APPOINTMENT (OUTPATIENT)
Dept: PHYSICAL THERAPY | Age: 53
End: 2018-03-26
Payer: COMMERCIAL

## 2018-03-27 NOTE — THERAPY DISCHARGE
[address] [facility]   (:1965) [address] [facility]  [address] [place] [address].  [phone]   [phone]           PHYSICIAN COMMUNICATION and discontinuation summary    REFERRING PHYSICIAN: Kat Ashby, MD  Return Physician Appointment: TBD  MEDICAL/REFERRING DIAGNOSIS:  · Wrist fracture [S62.109A]  ATTENDANCE: [address] [place] [address]. has attended 5 out of 5 visits, with 0 cancellation(s) and 0 no shows. ASSESSMENT:  DATE: 3/27/2018    PROGRESS: [facility]. made excellent progress, is independent with HEP, and has minimal pain. RECOMMENDATIONS: MD recommends discharge from PT services.      Thank you for this referral,  Bora Hernández, PT     Referring Physician Signature: Kristie Valenzuela MD          Date

## 2018-03-28 ENCOUNTER — APPOINTMENT (OUTPATIENT)
Dept: PHYSICAL THERAPY | Age: 53
End: 2018-03-28
Payer: COMMERCIAL

## 2018-04-06 ENCOUNTER — HOSPITAL ENCOUNTER (OUTPATIENT)
Dept: PHYSICAL THERAPY | Age: 53
End: 2018-04-06

## 2018-04-06 ENCOUNTER — HOSPITAL ENCOUNTER (OUTPATIENT)
Dept: PHYSICAL THERAPY | Age: 53
Discharge: HOME | End: 2018-04-06
Payer: COMMERCIAL

## 2018-04-06 PROCEDURE — 97162 PT EVAL MOD COMPLEX 30 MIN: CPT

## 2018-04-06 PROCEDURE — 97110 THERAPEUTIC EXERCISES: CPT

## 2018-04-06 NOTE — THERAPY EVALUATION
Alma Rosa Elliott. : 1965 [address] [address].  [phone]   [phone]        OUTPATIENT PHYSICAL THERAPY:Initial Assessment 2018        ICD-10: Treatment Diagnosis: ***  Precautions/Allergies:   Review of patient's allergies indicates no known allergies. Fall Risk Score: 1 (? 5 = High Risk)  MD Orders: Evaluate and treat MEDICAL/REFERRING DIAGNOSIS:  Wrist pain [M25.539]  DATE OF ONSET: L radial fx with ORIF on 18  REFERRING PHYSICIAN: Paul Sanders MD  RETURN PHYSICIAN APPOINTMENT: To be determined      INITIAL ASSESSMENT:  Mr. Mai Montano presents with L wrist pain with , impaired  strength consistent with above surgery as well as L knee pain which is limiting ability to use hand for grasping tasks that require high force and ambulate without pain which restricts ability to fully participate in ADLs using B hands, work responsibilities, and functional mobility. Pt is a good candidate for skilled PT in order to improve symptoms, strength, and ROM in order to return to prior level of function. PROBLEM LIST (Impacting functional limitations):  1. Decreased Strength  2. Decreased ADL/Functional Activities  3. Decreased Transfer Abilities  4. Decreased Ambulation Ability/Technique  5. Increased Pain  6. Decreased Activity Tolerance  7. Decreased Flexibility/Joint Mobility INTERVENTIONS PLANNED:  1. Balance Exercise  2. Cryotherapy  3. Electrical Stimulation  4. Heat  5. Home Exercise Program (HEP)  6. Manual Therapy  7. Neuromuscular Re-education/Strengthening  8. Range of Motion (ROM)  9. Therapeutic Activites  10. Therapeutic Exercise/Strengthening  11. Transcutaneous Electrical Nerve Stimulation (TENS)   TREATMENT PLAN:  Effective Dates: 18 TO 18.   Frequency/Duration: 2 times a week for 4 weeks  GOALS: (Goals have been discussed and agreed upon with patient.)  Short-Term Functional Goals: Time Frame: 2 weeks  1. Pt will report compliance with home program in order to improve strength and ROM. 2. Pt will improve  strength to exert 80 lbs of force using  dynamometer to improve ability to perform ADLs. Discharge Goals: Time Frame: 4 weeks  1. Pt will improve wrist extension ROM on L to at least 62 ° to be able to more comfortable bear weight on extended wrist for ADLs that require pushing through open hand. 2. Pt will improve  strength to exert 100 lb using  dynamometer to improve ability to perform work tasks and ADLs. Rehabilitation Potential For Stated Goals: Good  Regarding Dwightliam Anusha Healy's therapy, I certify that the treatment plan above will be carried out by a therapist or under their direction. Thank you for this referral,  [facility]     Referring Physician Signature: Manny Carroll MD              Date                      HISTORY:   History of Present Injury/Illness (Reason for Referral):  Pt had a fall onto his wrist with resultant radial fracture which was internally fixated. Prior to this incident he also had slipped when coming down from ladder and heard pop with immediate pain in L knee. This has given him significant trouble and he received an MRI last week and is awaiting the results. He completed PT here at [facility] and responded well, but still lacks strength in L . Past Medical History/Comorbidities:   Mr. Mary Loco  has a past medical history of Hoarseness; Laryngopharyngeal reflux; and Obstructive sleep apnea on CPAP (7/18/2016). He also has no past medical history of Difficult intubation; Heart failure ([address] Malignant hyperthermia due to anesthesia; Nausea & vomiting; Other ill-defined conditions(799.89); Pseudocholinesterase deficiency; Psychiatric disorder; Unspecified adverse effect of anesthesia; or Unspecified sleep apnea.   Mr. Mary Loco  has a past surgical history that includes hx heent; hx heent; and hx other surgical.  Social History/Living Environment:     Pt lives at home with wife. 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 Dominant Side:         RIGHT  Current Medications:    Current Outpatient Prescriptions:     azithromycin (ZITHROMAX Z-FANNY) 250 mg tablet, Take 1 Tab by mouth daily. , Disp: 6 Tab, Rfl: 0    esomeprazole (NEXIUM) 20 mg capsule, Take 40 mg by mouth daily. , Disp: , Rfl:     lisinopril (PRINIVIL, ZESTRIL) 10 mg tablet, Take 1 Tab by mouth daily. , Disp: 30 Tab, Rfl: 12    venlafaxine-SR (EFFEXOR-XR) 75 mg capsule, TAKE 3 CAPSULES BY MOUTH DAILY. , Disp: 90 Cap, Rfl: 10    cyanocobalamin (VITAMIN B-12) 1,000 mcg tablet, Take 1,000 mcg by mouth daily. , Disp: , Rfl:     glucosamine-chondroitin (ARTHX) 500-400 mg cap, Take 1 Cap by mouth daily. , Disp: , Rfl:     metFORMIN ER (GLUCOPHAGE XR) 500 mg tablet, Take 1 Tab by mouth daily (with dinner). , Disp: 60 Tab, Rfl: 12    atorvastatin (LIPITOR) 10 mg tablet, Take 1 Tab by mouth daily. , Disp: 30 Tab, Rfl: 12    BLOOD-GLUCOSE METER (FREESTYLE LITE METER), by Does Not Apply route., Disp: , Rfl:     lancets (FREESTYLE LANCETS) 28 gauge misc, Check twice daily, Disp: 100 Lancet, Rfl: 10    glucose blood VI test strips (FREESTYLE LITE STRIPS) strip, by Does Not Apply route See Admin Instructions. Check blood sugar twice daily, Disp: 100 Strip, Rfl: 10    fluticasone (FLONASE) 50 mcg/actuation nasal spray, 2 Sprays by Both Nostrils route daily. , Disp: 1 Bottle, Rfl: 12    potassium 99 mg tablet, Take 99 mg by mouth daily. , Disp: , Rfl:    Date Last Reviewed:  4/6/2018   # of Personal Factors/Comorbidities that affect the Plan of Care: 1-2: MODERATE COMPLEXITY   EXAMINATION:   Observation/Orthostatic Postural Assessment:          No swelling evident in L UE and scar healed well.    Palpation:          Not tender to palpation in L wrist.  ROM:     R wrist PROM  Flexion: 27 °   Extension: 68 °   L wrist PROM:   Flexion: 30 °   Extension: 57 °     Strength:     5/5 for wrist flexion, extension, radial deviation, ulnar deviation, pronation and supination B  R  at 2nd settin lb   R  at 4th settin lb  L  at 2nd settin lb  L  at 4th settin lb    Special Tests:          Pt able to perform intrinsic hand movements of lumbricals   Neurological Screen:        Sensation: pt reports no changes in sensation  Functional Mobility:         Gait/Ambulation:  Antalgic with limp on L LE   Balance:          Not tested    Body Structures Involved:  1. Nerves  2. Joints  3. Muscles  4. Ligaments Body Functions Affected:  1. Sensory/Pain  2. Neuromusculoskeletal  3. Movement Related Activities and Participation Affected:  1. Learning and Applying Knowledge  2. General Tasks and Demands  3. Mobility  4. Self Care  5. Domestic Life  6. Interpersonal Interactions and Relationships  7. Community, Social and [place]   # of elements that affect the Plan of Care: 3: MODERATE COMPLEXITY   CLINICAL PRESENTATION:   Presentation: Evolving clinical presentation with changing clinical characteristics: MODERATE COMPLEXITY   CLINICAL DECISION MAKING:   Outcome Measure:   Numeric pain rating scale (18): 0/10 at rest, 2/10 making fist   Medical Necessity:   · Patient is expected to demonstrate progress in strength and range of motion to increase independence with ADLs. Reason for Services/Other Comments:  · Patient has tolerated an increase in activity as demonstrated by: increased resistance/repetitions during therapeutic exercise.    Use of outcome tool(s) and clinical judgement create a POC that gives a: Questionable prediction of patient's progress: MODERATE COMPLEXITY   TREATMENT:   (In addition to Assessment/Re-Assessment sessions the following treatments were rendered)  THERAPEUTIC ACTIVITY: (See below for minutes): Therapeutic activities per grid below to improve mobility and strength. Required minimal visual, verbal, manual and tactile cues to promote coordination of left, upper extremity(s). THERAPEUTIC EXERCISE: (See below for minutes):  Exercises per grid below to improve mobility and strength. Required minimal visual, verbal, manual and tactile cues to promote proper body alignment, promote proper body posture and promote proper body mechanics. Progressed resistance, range, repetitions and complexity of movement as indicated. NEUROMUSCULAR RE-EDUCATION: (See below for minutes):  Exercise/activities per grid below to improve balance and posture. Required minimal visual, verbal, manual and tactile cues to promote coordination of left, upper extremity(s). MANUAL THERAPY: (See below for minutes): Joint mobilization, Soft tissue mobilization, Manipulation and Manual lymphatic drainage was utilized and necessary because of the patient's restricted joint motion, painful spasm, loss of articular motion and restricted motion of soft tissue. MODALITIES: (See below for minutes)    Date: 4/6/18 (visit 1)       Modalities:                                Therapeutic Exercise: 15 min       Prayer stretch  Educated on form. x30 s        Reverse prayer stretch Educated on form. x30 s        Wrist flexor stretch on wall X1'. Slide open palm down wall to stretch into extension       Pronation/supination x15 using 3# hammer        Wrist flexion/extension x15 using 3# hammer        Towel wringing x1' in each direction       Manual wrist stretching PT manually stretch wrist into flexion and extension: x3'        Manual Therapy:                        Therapeutic Activities:                                        HEP: See above chart. [facility] Portal  Treatment/Session Assessment:  Pt confirmed understanding of exercises.  He reported that he knows the exercises he should be doing for wrist/hand mobility and strengthening but has not gotten around to doing many of them. He was educated on several mobility and strength exercises that he can do independently and seated anywhere to increase compliance. · Pain/ Symptoms: Initial:  Pt reports that his main complaint with the wrist is the lack of strength. He also says he is waiting on hearing results of knee MRI and that in the meanwhile, the physician advised to discontinue all exercises. Post Session:  Pt reports no increase in symptoms following today's session. ·   Compliance with Program/Exercises: Will assess as treatment progresses. · Recommendations/Intent for next treatment session: \"Next visit will focus on advancements to more challenging activities\".   Total Treatment Duration:  PT Patient Time In/Time Out  Time In: 0845  Time Out: 0930     Ning Gutiérrez, SPT

## 2018-04-06 NOTE — THERAPY EVALUATION
Anca Barney. : 1965 [address] [facility],[address] [place] [address].  [phone]   [phone]        OUTPATIENT PHYSICAL THERAPY:Initial Assessment 2018        ICD-10: Treatment Diagnosis: Pain in left wrist (M25.532)  Precautions/Allergies:   Review of patient's allergies indicates no known allergies. Fall Risk Score: 1 (? 5 = High Risk)  MD Orders: Evaluate and treat MEDICAL/REFERRING DIAGNOSIS:  Wrist pain [M25.539]  DATE OF ONSET: L radial fx with ORIF on 18  REFERRING PHYSICIAN: Diaz Euceda MD  RETURN PHYSICIAN APPOINTMENT: To be determined      INITIAL ASSESSMENT:  Mr. Elio Saba presents with L wrist pain with , impaired  strength consistent with above surgery as well as L knee pain which is limiting ability to use hand for grasping tasks that require high force and ambulate without pain which restricts ability to fully participate in ADLs using B hands, work responsibilities, and functional mobility. Pt is a good candidate for skilled PT in order to improve symptoms, strength, and ROM in order to return to prior level of function. PROBLEM LIST (Impacting functional limitations):  1. Decreased Strength  2. Decreased ADL/Functional Activities  3. Decreased Transfer Abilities  4. Decreased Ambulation Ability/Technique  5. Increased Pain  6. Decreased Activity Tolerance  7. Decreased Flexibility/Joint Mobility INTERVENTIONS PLANNED:  1. Balance Exercise  2. Cryotherapy  3. Electrical Stimulation  4. Heat  5. Home Exercise Program (HEP)  6. Manual Therapy  7. Neuromuscular Re-education/Strengthening  8. Range of Motion (ROM)  9. Therapeutic Activites  10. Therapeutic Exercise/Strengthening  11. Transcutaneous Electrical Nerve Stimulation (TENS)   TREATMENT PLAN:  Effective Dates: 18 TO 18.   Frequency/Duration: 2 times a week for 4 weeks  GOALS: (Goals have been discussed and agreed upon with patient.)  Short-Term Functional Goals: Time Frame: 2 weeks  1. Pt will report compliance with home program in order to improve strength and ROM. 2. Pt will improve  strength to exert 80 lbs of force using  dynamometer to improve ability to perform ADLs. Discharge Goals: Time Frame: 4 weeks  1. Pt will improve wrist extension ROM on L to at least 62 ° to be able to more comfortable bear weight on extended wrist for ADLs that require pushing through open hand. 2. Pt will improve  strength to exert 100 lb using  dynamometer to improve ability to perform work tasks and ADLs. Rehabilitation Potential For Stated Goals: Good  Regarding Simran Walter Healy's therapy, I certify that the treatment plan above will be carried out by a therapist or under their direction. Thank you for this referral,  RADHA StT, SPT     Referring Physician Signature: Deonna Rivas MD              Date                      HISTORY:   History of Present Injury/Illness (Reason for Referral):  Pt had a fall onto his wrist with resultant radial fracture which was internally fixated. Prior to this incident he also had slipped when coming down from ladder and heard pop with immediate pain in L knee. This has given him significant trouble and he received an MRI last week and is awaiting the results. He completed [facility] at [facility] and responded well, but still lacks strength in L . Past Medical History/Comorbidities:   Mr. Aldo Parish  has a past medical history of Hoarseness; Laryngopharyngeal reflux; and Obstructive sleep apnea on CPAP (7/18/2016). He also has no past medical history of Difficult intubation; Heart failure ([address] Malignant hyperthermia due to anesthesia; Nausea & vomiting; Other ill-defined conditions(799.89); Pseudocholinesterase deficiency; Psychiatric disorder; Unspecified adverse effect of anesthesia; or Unspecified sleep apnea.   Mr. Aldo Parish  has a past surgical history that includes hx heent; hx heent; and hx other surgical.  Social History/Living Environment:     Pt lives at home with wife. 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 Dominant Side:         RIGHT  Current Medications:    Current Outpatient Prescriptions:     azithromycin (ZITHROMAX Z-FANNY) 250 mg tablet, Take 1 Tab by mouth daily. , Disp: 6 Tab, Rfl: 0    esomeprazole (NEXIUM) 20 mg capsule, Take 40 mg by mouth daily. , Disp: , Rfl:     lisinopril (PRINIVIL, ZESTRIL) 10 mg tablet, Take 1 Tab by mouth daily. , Disp: 30 Tab, Rfl: 12    venlafaxine-SR (EFFEXOR-XR) 75 mg capsule, TAKE 3 CAPSULES BY MOUTH DAILY. , Disp: 90 Cap, Rfl: 10    cyanocobalamin (VITAMIN B-12) 1,000 mcg tablet, Take 1,000 mcg by mouth daily. , Disp: , Rfl:     glucosamine-chondroitin (ARTHX) 500-400 mg cap, Take 1 Cap by mouth daily. , Disp: , Rfl:     metFORMIN ER (GLUCOPHAGE XR) 500 mg tablet, Take 1 Tab by mouth daily (with dinner). , Disp: 60 Tab, Rfl: 12    atorvastatin (LIPITOR) 10 mg tablet, Take 1 Tab by mouth daily. , Disp: 30 Tab, Rfl: 12    BLOOD-GLUCOSE METER (FREESTYLE LITE METER), by Does Not Apply route., Disp: , Rfl:     lancets (FREESTYLE LANCETS) 28 gauge misc, Check twice daily, Disp: 100 Lancet, Rfl: 10    glucose blood VI test strips (FREESTYLE LITE STRIPS) strip, by Does Not Apply route See Admin Instructions. Check blood sugar twice daily, Disp: 100 Strip, Rfl: 10    fluticasone (FLONASE) 50 mcg/actuation nasal spray, 2 Sprays by Both Nostrils route daily. , Disp: 1 Bottle, Rfl: 12    potassium 99 mg tablet, Take 99 mg by mouth daily. , Disp: , Rfl:    Date Last Reviewed:  4/6/2018   # of Personal Factors/Comorbidities that affect the Plan of Care: 1-2: MODERATE COMPLEXITY   EXAMINATION:   Observation/Orthostatic Postural Assessment:          No swelling evident in L UE and scar healed well. Palpation:          Not tender to palpation in L wrist.  ROM:     R wrist PROM  Flexion: 27 °   Extension: 68 °   L wrist PROM:   Flexion: 30 °   Extension: 57 °     Strength:     5/5 for wrist flexion, extension, radial deviation, ulnar deviation, pronation and supination B  R  at 2nd settin lb   R  at 4th settin lb  L  at 2nd settin lb  L  at 4th settin lb    Special Tests:          Pt able to perform intrinsic hand movements of lumbricals   Neurological Screen:        Sensation: pt reports no changes in sensation  Functional Mobility:         Gait/Ambulation:  Antalgic with limp on L LE   Balance:          Not tested    Body Structures Involved:  1. Nerves  2. Joints  3. Muscles  4. Ligaments Body Functions Affected:  1. Sensory/Pain  2. Neuromusculoskeletal  3. Movement Related Activities and Participation Affected:  1. Learning and Applying Knowledge  2. General Tasks and Demands  3. Mobility  4. Self Care  5. Domestic Life  6. Interpersonal Interactions and Relationships  7. Community, Social and [place]   # of elements that affect the Plan of Care: 3: MODERATE COMPLEXITY   CLINICAL PRESENTATION:   Presentation: Evolving clinical presentation with changing clinical characteristics: MODERATE COMPLEXITY   CLINICAL DECISION MAKING:   Outcome Measure:   Numeric pain rating scale (18): 0/10 at rest, 2/10 making fist   Medical Necessity:   · Patient is expected to demonstrate progress in strength and range of motion to increase independence with ADLs. Reason for Services/Other Comments:  · Patient has tolerated an increase in activity as demonstrated by: increased resistance/repetitions during therapeutic exercise.    Use of outcome tool(s) and clinical judgement create a POC that gives a: Questionable prediction of patient's progress: MODERATE COMPLEXITY   TREATMENT:   (In addition to Assessment/Re-Assessment sessions the following treatments were rendered)  THERAPEUTIC ACTIVITY: (See below for minutes): Therapeutic activities per grid below to improve mobility and strength. Required minimal visual, verbal, manual and tactile cues to promote coordination of left, upper extremity(s). THERAPEUTIC EXERCISE: (See below for minutes):  Exercises per grid below to improve mobility and strength. Required minimal visual, verbal, manual and tactile cues to promote proper body alignment, promote proper body posture and promote proper body mechanics. Progressed resistance, range, repetitions and complexity of movement as indicated. NEUROMUSCULAR RE-EDUCATION: (See below for minutes):  Exercise/activities per grid below to improve balance and posture. Required minimal visual, verbal, manual and tactile cues to promote coordination of left, upper extremity(s). MANUAL THERAPY: (See below for minutes): Joint mobilization, Soft tissue mobilization, Manipulation and Manual lymphatic drainage was utilized and necessary because of the patient's restricted joint motion, painful spasm, loss of articular motion and restricted motion of soft tissue. MODALITIES: (See below for minutes)    Date: 4/6/18 (visit 1)       Modalities:                                Therapeutic Exercise: 15 min       Prayer stretch  Educated on form. x30 s        Reverse prayer stretch Educated on form. x30 s        Wrist flexor stretch on wall X1'. Slide open palm down wall to stretch into extension       Pronation/supination x15 using 3# hammer        Wrist flexion/extension x15 using 3# hammer        Towel wringing x1' in each direction       Manual wrist stretching PT manually stretch wrist into flexion and extension: x3'        Manual Therapy:                        Therapeutic Activities:                                        HEP: See above chart. [employer] Portal  Treatment/Session Assessment:  Pt confirmed understanding of exercises.  He reported that he knows the exercises he should be doing for wrist/hand mobility and strengthening but has not gotten around to doing many of them. He was educated on several mobility and strength exercises that he can do independently and seated anywhere to increase compliance. · Pain/ Symptoms: Initial:  Pt reports that his main complaint with the wrist is the lack of strength. He also says he is waiting on hearing results of knee MRI and that in the meanwhile, the physician advised to discontinue all exercises. Post Session:  Pt reports no increase in symptoms following today's session. ·   Compliance with Program/Exercises: Will assess as treatment progresses. · Recommendations/Intent for next treatment session: \"Next visit will focus on advancements to more challenging activities\".   Total Treatment Duration:   [address], DPT

## 2018-04-06 NOTE — PROGRESS NOTES
Ambulatory/Rehab Services H2 Model Falls Risk Assessment    Risk Factor Pts. ·   Confusion/Disorientation/Impulsivity  []    4 ·   Symptomatic Depression  []   2 ·   Altered Elimination  []   1 ·   Dizziness/Vertigo  []   1 ·   Gender (Male)  [x]   1 ·   Any administered antiepileptics (anticonvulsants):  []   2 ·   Any administered benzodiazepines:  []   1 ·   Visual Impairment (specify):  []   1 ·   Portable Oxygen Use  []   1 ·   Orthostatic ? BP  []   1 ·   History of Recent Falls (within 3 mos.)  []   5     Ability to Rise from Chair (choose one) Pts. ·   Ability to rise in a single movement  []   0 ·   Pushes up, successful in one attempt  []   1 ·   Multiple attempts, but successful  []   3 ·   Unable to rise without assistance  []   4   Total: (5 or greater = High Risk) 1     Falls Prevention Plan:   []                Physical Limitations to Exercise (specify):   []                Mobility Assistance Device (type):   []                Exercise/Equipment Adaptation (specify):    ©2010 [facility] [address] [place] [address] Patent #[id number].  Federal Law prohibits the replication, distribution or use without written permission from [facility] [employer]

## 2018-04-13 ENCOUNTER — HOSPITAL ENCOUNTER (OUTPATIENT)
Dept: PHYSICAL THERAPY | Age: 53
Discharge: HOME | End: 2018-04-13
Payer: COMMERCIAL

## 2018-04-13 PROCEDURE — 97110 THERAPEUTIC EXERCISES: CPT

## 2018-04-13 NOTE — PROGRESS NOTES
Amanda Kimball. : 1965 [address] [facility],[address]  [address] [place] [address].  [phone]   [phone]        OUTPATIENT PHYSICAL THERAPY: Daily Note 2018        ICD-10: Treatment Diagnosis: Pain in left wrist (M25.532)  Precautions/Allergies:   Review of patient's allergies indicates no known allergies. Fall Risk Score: 1 (? 5 = High Risk)  MD Orders: Evaluate and treat MEDICAL/REFERRING DIAGNOSIS:  Wrist pain [M25.539]  DATE OF ONSET: L radial fx with ORIF on 18  REFERRING PHYSICIAN: Ileana Brennan MD  RETURN PHYSICIAN APPOINTMENT: To be determined      INITIAL ASSESSMENT:  Mr. Radha Sawyer presents with L wrist pain with , impaired  strength consistent with above surgery as well as L knee pain which is limiting ability to use hand for grasping tasks that require high force and ambulate without pain which restricts ability to fully participate in ADLs using B hands, work responsibilities, and functional mobility. Pt is a good candidate for skilled PT in order to improve symptoms, strength, and ROM in order to return to prior level of function. PROBLEM LIST (Impacting functional limitations):  1. Decreased Strength  2. Decreased ADL/Functional Activities  3. Decreased Transfer Abilities  4. Decreased Ambulation Ability/Technique  5. Increased Pain  6. Decreased Activity Tolerance  7. Decreased Flexibility/Joint Mobility INTERVENTIONS PLANNED:  1. Balance Exercise  2. Cryotherapy  3. Electrical Stimulation  4. Heat  5. Home Exercise Program (HEP)  6. Manual Therapy  7. Neuromuscular Re-education/Strengthening  8. Range of Motion (ROM)  9. Therapeutic Activites  10. Therapeutic Exercise/Strengthening  11. Transcutaneous Electrical Nerve Stimulation (TENS)   TREATMENT PLAN:  Effective Dates: 18 TO 18.   Frequency/Duration: 2 times a week for 4 weeks  GOALS: (Goals have been discussed and agreed upon with patient.)  Short-Term Functional Goals: Time Frame: 2 weeks  1. Pt will report compliance with home program in order to improve strength and ROM. 2. Pt will improve  strength to exert 80 lbs of force using  dynamometer to improve ability to perform ADLs. Discharge Goals: Time Frame: 4 weeks  1. Pt will improve wrist extension ROM on L to at least 62 ° to be able to more comfortable bear weight on extended wrist for ADLs that require pushing through open hand. 2. Pt will improve  strength to exert 100 lb using  dynamometer to improve ability to perform work tasks and ADLs. Rehabilitation Potential For Stated Goals: Good                HISTORY:   History of Present Injury/Illness (Reason for Referral):  Pt had a fall onto his wrist with resultant radial fracture which was internally fixated. Prior to this incident he also had slipped when coming down from ladder and heard pop with immediate pain in L knee. This has given him significant trouble and he received an MRI last week and is awaiting the results. He completed PT here at [facility] [place] and responded well, but still lacks strength in L . Past Medical History/Comorbidities:   Mr. Zach Carl  has a past medical history of Hoarseness; Laryngopharyngeal reflux; and Obstructive sleep apnea on CPAP (7/18/2016). He also has no past medical history of Difficult intubation; Heart failure ([address] Malignant hyperthermia due to anesthesia; Nausea & vomiting; Other ill-defined conditions(799.89); Pseudocholinesterase deficiency; Psychiatric disorder; Unspecified adverse effect of anesthesia; or Unspecified sleep apnea. Mr. Zach Carl  has a past surgical history that includes hx heent; hx heent; and hx other surgical.  Social History/Living Environment:     Pt lives at home with wife. 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  He can perform in a managerial function but still requires high mobility tasks. Dominant Side:         RIGHT  Current Medications:    Current Outpatient Prescriptions:     azithromycin (ZITHROMAX Z-FANNY) 250 mg tablet, Take 1 Tab by mouth daily. , Disp: 6 Tab, Rfl: 0    esomeprazole (NEXIUM) 20 mg capsule, Take 40 mg by mouth daily. , Disp: , Rfl:     lisinopril (PRINIVIL, ZESTRIL) 10 mg tablet, Take 1 Tab by mouth daily. , Disp: 30 Tab, Rfl: 12    venlafaxine-SR (EFFEXOR-XR) 75 mg capsule, TAKE 3 CAPSULES BY MOUTH DAILY. , Disp: 90 Cap, Rfl: 10    cyanocobalamin (VITAMIN B-12) 1,000 mcg tablet, Take 1,000 mcg by mouth daily. , Disp: , Rfl:     glucosamine-chondroitin (ARTHX) 500-400 mg cap, Take 1 Cap by mouth daily. , Disp: , Rfl:     metFORMIN ER (GLUCOPHAGE XR) 500 mg tablet, Take 1 Tab by mouth daily (with dinner). , Disp: 60 Tab, Rfl: 12    atorvastatin (LIPITOR) 10 mg tablet, Take 1 Tab by mouth daily. , Disp: 30 Tab, Rfl: 12    BLOOD-GLUCOSE METER (FREESTYLE LITE METER), by Does Not Apply route., Disp: , Rfl:     lancets (FREESTYLE LANCETS) 28 gauge misc, Check twice daily, Disp: 100 Lancet, Rfl: 10    glucose blood VI test strips (FREESTYLE LITE STRIPS) strip, by Does Not Apply route See Admin Instructions. Check blood sugar twice daily, Disp: 100 Strip, Rfl: 10    fluticasone (FLONASE) 50 mcg/actuation nasal spray, 2 Sprays by Both Nostrils route daily. , Disp: 1 Bottle, Rfl: 12    potassium 99 mg tablet, Take 99 mg by mouth daily. , Disp: , Rfl:    Date Last Reviewed:  2018   EXAMINATION:   Observation/Orthostatic Postural Assessment:          No swelling evident in L UE and scar healed well.    Palpation:          Not tender to palpation in L wrist.  ROM:     R wrist PROM  Flexion: 27 °   Extension: 68 °   L wrist PROM:   Flexion: 30 °   Extension: 57 °     Strength:     5/5 for wrist flexion, extension, radial deviation, ulnar deviation, pronation and supination B  R  at 2nd settin lb   R  at 4th settin lb  L  at 2nd settin lb  L  at 4th settin lb    Special Tests:          Pt able to perform intrinsic hand movements of lumbricals   Neurological Screen:        Sensation: pt reports no changes in sensation  Functional Mobility:         Gait/Ambulation:  Antalgic with limp on L LE   Balance:          Not tested    Body Structures Involved:  1. Nerves  2. Joints  3. Muscles  4. Ligaments Body Functions Affected:  1. Sensory/Pain  2. Neuromusculoskeletal  3. Movement Related Activities and Participation Affected:  1. Learning and Applying Knowledge  2. General Tasks and Demands  3. Mobility  4. Self Care  5. Domestic Life  6. Interpersonal Interactions and Relationships  7. Community, Social and Civic Life   CLINICAL PRESENTATION:   CLINICAL DECISION MAKING:   Outcome Measure:   Numeric pain rating scale (18): 0/10 at rest, 2/10 making fist   Medical Necessity:   · Patient is expected to demonstrate progress in strength and range of motion to increase independence with ADLs. Reason for Services/Other Comments:  · Patient has tolerated an increase in activity as demonstrated by: increased resistance/repetitions during therapeutic exercise. TREATMENT:   (In addition to Assessment/Re-Assessment sessions the following treatments were rendered)  THERAPEUTIC ACTIVITY: (See below for minutes): Therapeutic activities per grid below to improve mobility and strength. Required minimal visual, verbal, manual and tactile cues to promote coordination of left, upper extremity(s). THERAPEUTIC EXERCISE: (See below for minutes):  Exercises per grid below to improve mobility and strength. Required minimal visual, verbal, manual and tactile cues to promote proper body alignment, promote proper body posture and promote proper body mechanics. Progressed resistance, range, repetitions and complexity of movement as indicated.   NEUROMUSCULAR RE-EDUCATION: (See below for minutes):  Exercise/activities per grid below to improve balance and posture. Required minimal visual, verbal, manual and tactile cues to promote coordination of left, upper extremity(s). MANUAL THERAPY: (See below for minutes): Joint mobilization, Soft tissue mobilization, Manipulation and Manual lymphatic drainage was utilized and necessary because of the patient's restricted joint motion, painful spasm, loss of articular motion and restricted motion of soft tissue. MODALITIES: (See below for minutes)    Date: 4/6/18 (visit 1) 4/13/18 (visit 2)      Modalities:                                Therapeutic Exercise: 15 min 45 min      Prayer stretch  Educated on form. x30 s  3x1'       Reverse prayer stretch Educated on form. x30 s  3x1'      Wrist flexor stretch on wall X1'. Slide open palm down wall to stretch into extension       Pronation/supination x15 using 3# hammer  2x1' with 3# hammer       Wrist flexion/extension x15 using 3# hammer  15x4#       Towel wringing x1' in each direction       Gripper   3x1' squeezing  x1' hold       Wrist extension using dowel and weight on string   2x1#, 2x2#, 2x3#, 2x4#      DB lift from floor to stool   Pinch weight around bell. 2x5x15#      KB curl  Squeeze handle so weight does not swing. 2x6x15#      Weight plate lift  Pinch plates together. 2x6 using 3 2.5# plates      Ankle weight lift - ground to table  Ankle weight rolled up for open hand . 5x6#. 5x10#              Manual wrist stretching PT manually stretch wrist into flexion and extension: x3'        Manual Therapy:                        Therapeutic Activities:                                        HEP: See above chart. [facility] Portal  Treatment/Session Assessment: Pt tolerated exercises well. He was educated that he may be sore from exercises. He was also educated to continue ROM/stretching at home and to emphasize strengthening.  He performed a variety of exercises today which he was told to implement what he could at home and he confirmed he would try to be more diligent with his exercises. He was told using a gripper could be convenient to use and could be easily used throughout the day. PT should entail therapeutic exercise to improve strength and ROM of forearm, wrist, and hand. · Pain/ Symptoms: Initial:  0/10 at rest. 4/10 squeezing fist. Pt reports that he has not done his exercises much due to being busy. Pt reports that imaging revealed a meniscus tear which he plans to have surgery for, but due to work, has to delay it for ~6 weeks. Post Session:  Pt reports no increase in symptoms following today's session. ·   Compliance with Program/Exercises: Will assess as treatment progresses. · Recommendations/Intent for next treatment session: \"Next visit will focus on advancements to more challenging activities\".   Total Treatment Duration:  PT Patient Time In/Time Out  Time In: 0800  Time Out: 0845     David Shearer, SPT

## 2018-04-16 PROBLEM — F41.9 ANXIETY: Status: ACTIVE | Noted: 2018-04-16

## 2018-04-16 PROBLEM — E66.9 OBESITY (BMI 30-39.9): Status: RESOLVED | Noted: 2017-12-13 | Resolved: 2018-04-16

## 2018-04-16 PROBLEM — K76.0 FATTY INFILTRATION OF LIVER: Status: ACTIVE | Noted: 2018-04-16

## 2018-04-16 PROBLEM — E66.01 SEVERE OBESITY (BMI 35.0-39.9) WITH COMORBIDITY (HCC): Status: ACTIVE | Noted: 2018-04-16

## 2018-04-16 PROBLEM — E11.9 CONTROLLED TYPE 2 DIABETES MELLITUS WITHOUT COMPLICATION, WITHOUT LONG-TERM CURRENT USE OF INSULIN (HCC): Status: ACTIVE | Noted: 2018-04-16

## 2018-04-16 PROBLEM — D69.6 THROMBOCYTOPENIA (HCC): Status: ACTIVE | Noted: 2018-04-16

## 2018-04-20 ENCOUNTER — HOSPITAL ENCOUNTER (OUTPATIENT)
Dept: ULTRASOUND IMAGING | Age: 53
Discharge: HOME | End: 2018-04-20
Attending: INTERNAL MEDICINE
Payer: COMMERCIAL

## 2018-04-20 DIAGNOSIS — D69.6 THROMBOCYTOPENIA (HCC): ICD-10-CM

## 2018-04-20 DIAGNOSIS — K76.0 FATTY INFILTRATION OF LIVER: ICD-10-CM

## 2018-04-20 PROCEDURE — 76700 US EXAM ABDOM COMPLETE: CPT

## 2018-04-20 NOTE — PROGRESS NOTES
US still has fatty liver--spleen slightly big can go along with the liver issue--it is same size as before so all is stable

## 2018-05-09 ENCOUNTER — HOSPITAL ENCOUNTER (OUTPATIENT)
Dept: LAB | Age: 53
Discharge: HOME | End: 2018-05-09
Payer: COMMERCIAL

## 2018-05-09 DIAGNOSIS — K76.0 FATTY INFILTRATION OF LIVER: ICD-10-CM

## 2018-05-09 DIAGNOSIS — D69.6 THROMBOCYTOPENIA (HCC): ICD-10-CM

## 2018-05-09 LAB
ALBUMIN SERPL-MCNC: 3.4 G/DL (ref 3.5–5)
ALBUMIN/GLOB SERPL: 1.2 {RATIO} (ref 1.2–3.5)
ALP SERPL-CCNC: 88 U/L (ref 50–136)
ALT SERPL-CCNC: 88 U/L (ref 12–65)
ANION GAP SERPL CALC-SCNC: 5 MMOL/L (ref 7–16)
AST SERPL-CCNC: 54 U/L (ref 15–37)
BASOPHILS # BLD: 0 K/UL (ref 0–0.2)
BASOPHILS NFR BLD: 1 % (ref 0–2)
BILIRUB SERPL-MCNC: 1.2 MG/DL (ref 0.2–1.1)
BUN SERPL-MCNC: 10 MG/DL (ref 6–23)
CALCIUM SERPL-MCNC: 8.5 MG/DL (ref 8.3–10.4)
CHLORIDE SERPL-SCNC: 112 MMOL/L (ref 98–107)
CO2 SERPL-SCNC: 24 MMOL/L (ref 21–32)
CREAT SERPL-MCNC: 0.84 MG/DL (ref 0.8–1.5)
CRP SERPL-MCNC: 0.5 MG/DL (ref 0–0.9)
DIFFERENTIAL METHOD BLD: ABNORMAL
EOSINOPHIL # BLD: 0.2 K/UL (ref 0–0.8)
EOSINOPHIL NFR BLD: 3 % (ref 0.5–7.8)
ERYTHROCYTE [DISTWIDTH] IN BLOOD BY AUTOMATED COUNT: 14.8 % (ref 11.9–14.6)
ERYTHROCYTE [SEDIMENTATION RATE] IN BLOOD: 10 MM/HR (ref 0–20)
FERRITIN SERPL-MCNC: 60 NG/ML (ref 8–388)
GLOBULIN SER CALC-MCNC: 2.9 G/DL (ref 2.3–3.5)
GLUCOSE SERPL-MCNC: 186 MG/DL (ref 65–100)
HCT VFR BLD AUTO: 38.6 % (ref 41.1–50.3)
HGB BLD-MCNC: 13.6 G/DL (ref 13.6–17.2)
LYMPHOCYTES # BLD: 1.5 K/UL (ref 0.5–4.6)
LYMPHOCYTES NFR BLD: 27 % (ref 13–44)
MCH RBC QN AUTO: 31.6 PG (ref 26.1–32.9)
MCHC RBC AUTO-ENTMCNC: 35.2 G/DL (ref 31.4–35)
MCV RBC AUTO: 89.6 FL (ref 79.6–97.8)
MONOCYTES # BLD: 0.5 K/UL (ref 0.1–1.3)
MONOCYTES NFR BLD: 8 % (ref 4–12)
NEUTS SEG # BLD: 3.4 K/UL (ref 1.7–8.2)
NEUTS SEG NFR BLD: 61 % (ref 43–78)
NRBC # BLD: 0 K/UL (ref 0–0.2)
PLATELET # BLD AUTO: 81 K/UL (ref 150–450)
PMV BLD AUTO: 10.5 FL (ref 10.8–14.1)
POTASSIUM SERPL-SCNC: 3.9 MMOL/L (ref 3.5–5.1)
PROT SERPL-MCNC: 6.3 G/DL (ref 6.3–8.2)
RBC # BLD AUTO: 4.31 M/UL (ref 4.23–5.67)
SODIUM SERPL-SCNC: 141 MMOL/L (ref 136–145)
WBC # BLD AUTO: 5.6 K/UL (ref 4.3–11.1)

## 2018-05-09 PROCEDURE — 86140 C-REACTIVE PROTEIN: CPT | Performed by: INTERNAL MEDICINE

## 2018-05-09 PROCEDURE — 88184 FLOWCYTOMETRY/ TC 1 MARKER: CPT | Performed by: INTERNAL MEDICINE

## 2018-05-09 PROCEDURE — 88185 FLOWCYTOMETRY/TC ADD-ON: CPT | Performed by: INTERNAL MEDICINE

## 2018-05-09 PROCEDURE — 80053 COMPREHEN METABOLIC PANEL: CPT | Performed by: INTERNAL MEDICINE

## 2018-05-09 PROCEDURE — 85652 RBC SED RATE AUTOMATED: CPT | Performed by: INTERNAL MEDICINE

## 2018-05-09 PROCEDURE — 85025 COMPLETE CBC W/AUTO DIFF WBC: CPT | Performed by: INTERNAL MEDICINE

## 2018-05-09 PROCEDURE — 82728 ASSAY OF FERRITIN: CPT | Performed by: INTERNAL MEDICINE

## 2018-05-09 PROCEDURE — 36415 COLL VENOUS BLD VENIPUNCTURE: CPT | Performed by: INTERNAL MEDICINE

## 2018-05-10 ENCOUNTER — HOSPITAL ENCOUNTER (OUTPATIENT)
Dept: LAB | Age: 53
Discharge: HOME | End: 2018-05-10
Payer: COMMERCIAL

## 2018-05-10 LAB
APTT PPP: 32 SEC (ref 23.2–35.3)
FIBRINOGEN PPP-MCNC: 282 MG/DL (ref 190–501)
INR PPP: 1.2
Lab: NORMAL
PROTHROMBIN TIME: 15 SEC (ref 11.5–14.5)
TEST DESCRIPTION:,ATST: NORMAL

## 2018-05-10 PROCEDURE — 85730 THROMBOPLASTIN TIME PARTIAL: CPT | Performed by: INTERNAL MEDICINE

## 2018-05-10 PROCEDURE — 85610 PROTHROMBIN TIME: CPT | Performed by: INTERNAL MEDICINE

## 2018-05-10 PROCEDURE — 85384 FIBRINOGEN ACTIVITY: CPT | Performed by: INTERNAL MEDICINE

## 2018-05-11 LAB — PATH REV BLD -IMP: NORMAL

## 2018-05-15 LAB
Lab: NORMAL
REFERENCE LAB,REFLB: NORMAL
TEST DESCRIPTION:,ATST: NORMAL

## 2018-05-22 ENCOUNTER — HOSPITAL ENCOUNTER (OUTPATIENT)
Dept: MRI IMAGING | Age: 53
Discharge: HOME | End: 2018-05-22
Attending: INTERNAL MEDICINE
Payer: COMMERCIAL

## 2018-05-22 DIAGNOSIS — D69.6 THROMBOCYTOPENIA (HCC): ICD-10-CM

## 2018-05-22 DIAGNOSIS — K76.0 FATTY INFILTRATION OF LIVER: ICD-10-CM

## 2018-05-22 PROCEDURE — A9577 INJ MULTIHANCE: HCPCS | Performed by: INTERNAL MEDICINE

## 2018-05-22 PROCEDURE — 74183 MRI ABD W/O CNTR FLWD CNTR: CPT

## 2018-05-22 PROCEDURE — 74011250636 HC RX REV CODE- 250/636: Performed by: INTERNAL MEDICINE

## 2018-05-22 PROCEDURE — 74011000258 HC RX REV CODE- 258: Performed by: INTERNAL MEDICINE

## 2018-05-22 RX ORDER — SODIUM CHLORIDE 0.9 % (FLUSH) 0.9 %
10 SYRINGE (ML) INJECTION
Status: COMPLETED | OUTPATIENT
Start: 2018-05-22 | End: 2018-05-22

## 2018-05-22 RX ADMIN — SODIUM CHLORIDE 100 ML: 900 INJECTION, SOLUTION INTRAVENOUS at 08:45

## 2018-05-22 RX ADMIN — Medication 10 ML: at 08:45

## 2018-05-22 RX ADMIN — GADOBENATE DIMEGLUMINE 10 ML: 529 INJECTION, SOLUTION INTRAVENOUS at 08:45

## 2018-05-29 ENCOUNTER — HOSPITAL ENCOUNTER (OUTPATIENT)
Dept: LAB | Age: 53
Discharge: HOME | End: 2018-05-29
Payer: COMMERCIAL

## 2018-05-29 DIAGNOSIS — D69.6 THROMBOCYTOPENIA (HCC): ICD-10-CM

## 2018-05-29 LAB
ALBUMIN SERPL-MCNC: 3.2 G/DL (ref 3.5–5)
ALBUMIN/GLOB SERPL: 1.1 {RATIO} (ref 1.2–3.5)
ALP SERPL-CCNC: 78 U/L (ref 50–136)
ALT SERPL-CCNC: 65 U/L (ref 12–65)
ANION GAP SERPL CALC-SCNC: 7 MMOL/L (ref 7–16)
AST SERPL-CCNC: 46 U/L (ref 15–37)
BASOPHILS # BLD: 0 K/UL (ref 0–0.2)
BASOPHILS NFR BLD: 1 % (ref 0–2)
BILIRUB SERPL-MCNC: 1.1 MG/DL (ref 0.2–1.1)
BUN SERPL-MCNC: 12 MG/DL (ref 6–23)
CALCIUM SERPL-MCNC: 8.2 MG/DL (ref 8.3–10.4)
CHLORIDE SERPL-SCNC: 110 MMOL/L (ref 98–107)
CO2 SERPL-SCNC: 24 MMOL/L (ref 21–32)
CREAT SERPL-MCNC: 0.63 MG/DL (ref 0.8–1.5)
DIFFERENTIAL METHOD BLD: ABNORMAL
EOSINOPHIL # BLD: 0.2 K/UL (ref 0–0.8)
EOSINOPHIL NFR BLD: 5 % (ref 0.5–7.8)
ERYTHROCYTE [DISTWIDTH] IN BLOOD BY AUTOMATED COUNT: 14.6 % (ref 11.9–14.6)
GLOBULIN SER CALC-MCNC: 3 G/DL (ref 2.3–3.5)
GLUCOSE SERPL-MCNC: 135 MG/DL (ref 65–100)
HCT VFR BLD AUTO: 36.7 % (ref 41.1–50.3)
HGB BLD-MCNC: 13 G/DL (ref 13.6–17.2)
LYMPHOCYTES # BLD: 1.2 K/UL (ref 0.5–4.6)
LYMPHOCYTES NFR BLD: 35 % (ref 13–44)
MCH RBC QN AUTO: 31.5 PG (ref 26.1–32.9)
MCHC RBC AUTO-ENTMCNC: 35.4 G/DL (ref 31.4–35)
MCV RBC AUTO: 88.9 FL (ref 79.6–97.8)
MONOCYTES # BLD: 0.3 K/UL (ref 0.1–1.3)
MONOCYTES NFR BLD: 8 % (ref 4–12)
NEUTS SEG # BLD: 1.8 K/UL (ref 1.7–8.2)
NEUTS SEG NFR BLD: 51 % (ref 43–78)
NRBC # BLD: 0 K/UL (ref 0–0.2)
PLATELET # BLD AUTO: 70 K/UL (ref 150–450)
PMV BLD AUTO: 10.5 FL (ref 10.8–14.1)
POTASSIUM SERPL-SCNC: 4.1 MMOL/L (ref 3.5–5.1)
PROT SERPL-MCNC: 6.2 G/DL (ref 6.3–8.2)
RBC # BLD AUTO: 4.13 M/UL (ref 4.23–5.67)
SODIUM SERPL-SCNC: 141 MMOL/L (ref 136–145)
WBC # BLD AUTO: 3.4 K/UL (ref 4.3–11.1)

## 2018-05-29 PROCEDURE — 36415 COLL VENOUS BLD VENIPUNCTURE: CPT | Performed by: INTERNAL MEDICINE

## 2018-05-29 PROCEDURE — 85025 COMPLETE CBC W/AUTO DIFF WBC: CPT | Performed by: INTERNAL MEDICINE

## 2018-05-29 PROCEDURE — 80053 COMPREHEN METABOLIC PANEL: CPT | Performed by: INTERNAL MEDICINE

## 2018-06-19 ENCOUNTER — TELEPHONE (OUTPATIENT)
Dept: NUTRITION | Age: 53
End: 2018-06-19

## 2018-06-29 ENCOUNTER — TELEPHONE (OUTPATIENT)
Dept: NUTRITION | Age: 53
End: 2018-06-29

## 2018-06-29 NOTE — TELEPHONE ENCOUNTER
Nutrition Counseling: Called pt and left voicemail with insurance coverage information. Reviewed self-pay rates and encouraged pt to call to schedule.      Rani Mehta, [address] [place] [address],   Outpatient Dietitian  Office: [phone]  Cell: [phone]

## 2018-07-10 ENCOUNTER — TELEPHONE (OUTPATIENT)
Dept: NUTRITION | Age: 53
End: 2018-07-10

## 2018-07-10 NOTE — TELEPHONE ENCOUNTER
Nutrition Counseling:  Pt contacted regarding insurance coverage, explained self pay rates. Pt is interested, but wishes to call RD at a later date to schedule appt.      Boogie Young, [address] [place] [address], [address] [place] [address]  Outpatient Dietitian  Office: [phone]  Cell: [phone]

## 2018-08-08 ENCOUNTER — ANESTHESIA (OUTPATIENT)
Dept: ENDOSCOPY | Age: 53
End: 2018-08-08
Payer: COMMERCIAL

## 2018-08-08 ENCOUNTER — ANESTHESIA EVENT (OUTPATIENT)
Dept: ENDOSCOPY | Age: 53
End: 2018-08-08
Payer: COMMERCIAL

## 2018-08-08 ENCOUNTER — HOSPITAL ENCOUNTER (OUTPATIENT)
Age: 53
Setting detail: OUTPATIENT SURGERY
Discharge: HOME | End: 2018-08-08
Attending: INTERNAL MEDICINE | Admitting: INTERNAL MEDICINE
Payer: COMMERCIAL

## 2018-08-08 VITALS
RESPIRATION RATE: 14 BRPM | TEMPERATURE: 98 F | HEART RATE: 68 BPM | BODY MASS INDEX: 39.2 KG/M2 | HEIGHT: 71 IN | OXYGEN SATURATION: 99 % | DIASTOLIC BLOOD PRESSURE: 62 MMHG | SYSTOLIC BLOOD PRESSURE: 128 MMHG | WEIGHT: 280 LBS

## 2018-08-08 LAB — GLUCOSE BLD STRIP.AUTO-MCNC: 99 MG/DL (ref 65–100)

## 2018-08-08 PROCEDURE — 74011250636 HC RX REV CODE- 250/636: Performed by: ANESTHESIOLOGY

## 2018-08-08 PROCEDURE — 76040000025: Performed by: INTERNAL MEDICINE

## 2018-08-08 PROCEDURE — 76060000032 HC ANESTHESIA 0.5 TO 1 HR: Performed by: INTERNAL MEDICINE

## 2018-08-08 PROCEDURE — 77030008969: Performed by: INTERNAL MEDICINE

## 2018-08-08 PROCEDURE — 74011250636 HC RX REV CODE- 250/636

## 2018-08-08 PROCEDURE — 82962 GLUCOSE BLOOD TEST: CPT

## 2018-08-08 RX ORDER — SODIUM CHLORIDE 0.9 % (FLUSH) 0.9 %
5-10 SYRINGE (ML) INJECTION AS NEEDED
Status: CANCELLED | OUTPATIENT
Start: 2018-08-08

## 2018-08-08 RX ORDER — SODIUM CHLORIDE, SODIUM LACTATE, POTASSIUM CHLORIDE, CALCIUM CHLORIDE 600; 310; 30; 20 MG/100ML; MG/100ML; MG/100ML; MG/100ML
100 INJECTION, SOLUTION INTRAVENOUS CONTINUOUS
Status: CANCELLED | OUTPATIENT
Start: 2018-08-08

## 2018-08-08 RX ORDER — PROPOFOL 10 MG/ML
INJECTION, EMULSION INTRAVENOUS
Status: DISCONTINUED | OUTPATIENT
Start: 2018-08-08 | End: 2018-08-08 | Stop reason: HOSPADM

## 2018-08-08 RX ORDER — PROPOFOL 10 MG/ML
INJECTION, EMULSION INTRAVENOUS AS NEEDED
Status: DISCONTINUED | OUTPATIENT
Start: 2018-08-08 | End: 2018-08-08 | Stop reason: HOSPADM

## 2018-08-08 RX ORDER — SODIUM CHLORIDE, SODIUM LACTATE, POTASSIUM CHLORIDE, CALCIUM CHLORIDE 600; 310; 30; 20 MG/100ML; MG/100ML; MG/100ML; MG/100ML
100 INJECTION, SOLUTION INTRAVENOUS CONTINUOUS
Status: DISCONTINUED | OUTPATIENT
Start: 2018-08-08 | End: 2018-08-09 | Stop reason: HOSPADM

## 2018-08-08 RX ADMIN — PROPOFOL 30 MG: 10 INJECTION, EMULSION INTRAVENOUS at 13:06

## 2018-08-08 RX ADMIN — PROPOFOL 30 MG: 10 INJECTION, EMULSION INTRAVENOUS at 13:08

## 2018-08-08 RX ADMIN — PROPOFOL 40 MG: 10 INJECTION, EMULSION INTRAVENOUS at 13:04

## 2018-08-08 RX ADMIN — PROPOFOL 200 MCG/KG/MIN: 10 INJECTION, EMULSION INTRAVENOUS at 13:04

## 2018-08-08 RX ADMIN — SODIUM CHLORIDE, SODIUM LACTATE, POTASSIUM CHLORIDE, AND CALCIUM CHLORIDE 100 ML/HR: 600; 310; 30; 20 INJECTION, SOLUTION INTRAVENOUS at 12:47

## 2018-08-08 NOTE — H&P
History and Physical for Endoscopic Ultrasound             Date: 8/8/2018     History of Present Illness:  Patient presents to undergo endoscopic ultrasound to evaluate for an etiology of pancreatitis. Patient denies any diarrhea, constipation, visible GI bleeding, fevers or chills. Past Medical History:   Diagnosis Date    Cancer ([facility]     skin CA    Diabetes ([facility]     type 2; dx 2017- avg fasting 130 -last A1C=6.2 on 4/16/18     GERD (gastroesophageal reflux disease)     controlled with med    Hoarseness     Hypertension     onset 2018    Laryngopharyngeal reflux     Liver disease     \"fatty liver\"- enzymes high- Dr Varinder Hicks said it was \"cirrotic\"    Morbid obesity ([facility] [facility]     BMI- 44    Obstructive sleep apnea on CPAP 7/18/2016     Past Surgical History:   Procedure Laterality Date    HX HEENT      nasal surg    HX HEENT      Sinus Surg x2    HX ORTHOPAEDIC Right 2008    wrist fx    HX ORTHOPAEDIC Left 2017    wrist fx    HX OTHER SURGICAL      Wrist surgery      Family History   Problem Relation Age of Onset    Cancer Mother     Diabetes Father     Dementia Father     Hypertension Father      Social History   Substance Use Topics    Smoking status: Never Smoker    Smokeless tobacco: Never Used    Alcohol use Yes      Comment: occ        No Known Allergies  No current facility-administered medications for this encounter. Current Outpatient Prescriptions   Medication Sig    naproxen sodium (ALEVE) 220 mg cap Take  by mouth as needed. LAST DOSE 8/5/18    esomeprazole (NEXIUM) 20 mg capsule Take 20 mg by mouth daily.  lisinopril (PRINIVIL, ZESTRIL) 10 mg tablet Take 1 Tab by mouth daily.  venlafaxine-SR (EFFEXOR-XR) 75 mg capsule TAKE 3 CAPSULES BY MOUTH DAILY. (Patient taking differently: TAKE 1 CAPSULES BY MOUTH DAILY. )    metFORMIN ER (GLUCOPHAGE XR) 500 mg tablet Take 1 Tab by mouth daily (with dinner).  (Patient taking differently: Take 500 mg by mouth daily.)    atorvastatin (LIPITOR) 10 mg tablet Take 1 Tab by mouth daily.  fluticasone (FLONASE) 50 mcg/actuation nasal spray 2 Sprays by Both Nostrils route daily. (Patient taking differently: 2 Sprays by Both Nostrils route daily as needed.)    potassium 99 mg tablet Take 99 mg by mouth daily. Review of Systems:  A detailed 10 organ review of systems is obtained with pertinent positives as listed in the History of Present Illness. All others are negative. Objective:     Physical Exam:  There were no vitals taken for this visit. General:  Alert and oriented. Heart: Regular rate and rhythm  Lungs:  Clear to auscultation bilaterally  Abdomen: Soft, nontender, nondistended    Impression/Plan:     Proceed with EUS as planned. I have discussed with the patient the technique, benefits, alternatives, and risks of the procedure, including medication reaction, immediate or delayed bleeding, or perforation of the gastrointestinal tract.     Signed By: Melissa Thomas MD     August 8, 2018

## 2018-08-08 NOTE — ANESTHESIA PREPROCEDURE EVALUATION
Anesthetic History               Review of Systems / Medical History  Patient summary reviewed and pertinent labs reviewed    Pulmonary        Sleep apnea: CPAP           Neuro/Psych              Cardiovascular    Hypertension              Exercise tolerance: >4 METS     GI/Hepatic/Renal     GERD (on PPI): well controlled      Liver disease (fatty liver and ? cirrhosis)     Endo/Other        Obesity     Other Findings              Physical Exam    Airway  Mallampati: III  TM Distance: 4 - 6 cm  Neck ROM: normal range of motion   Mouth opening: Normal    Comments: beard Cardiovascular    Rhythm: regular  Rate: normal         Dental  No notable dental hx       Pulmonary  Breath sounds clear to auscultation               Abdominal         Other Findings            Anesthetic Plan    ASA: 2  Anesthesia type: total IV anesthesia          Induction: Intravenous  Anesthetic plan and risks discussed with: Patient and Family

## 2018-08-08 NOTE — ANESTHESIA POSTPROCEDURE EVALUATION
Post-Anesthesia Evaluation and Assessment    Patient: Bandar Bryant MRN: [id number]  SSN: [id number]    YOB: 1965  Age: 47 y.o. Sex: male       Cardiovascular Function/Vital Signs  Visit Vitals    /62    Pulse 68    Temp 36.7 °C (98 °F)    Resp 17    Ht 5' 11\" (1.803 m)    Wt 127 kg (280 lb)    SpO2 99%    BMI 39.05 kg/m2       Patient is status post total IV anesthesia anesthesia for Procedure(s):  ENDOSCOPIC ULTRASOUND (EUS)/ 40  ESOPHAGOGASTRODUODENOSCOPY (EGD). Nausea/Vomiting: None    Postoperative hydration reviewed and adequate. Pain:  Pain Scale 1: Numeric (0 - 10) (08/08/18 1240)  Pain Intensity 1: 0 (08/08/18 1240)   Managed    Neurological Status: At baseline    Mental Status and Level of Consciousness: Alert and oriented     Pulmonary Status:   O2 Device: CO2 nasal cannula (08/08/18 1333)   Adequate oxygenation and airway patent    Complications related to anesthesia: None    Post-anesthesia assessment completed.  No concerns    Signed By: Gurvinder Encinas MD     August 8, 2018

## 2018-08-08 NOTE — DISCHARGE INSTRUCTIONS
Gastrointestinal Esophagogastroduodenoscopy (EGD) - Upper Exam Discharge Instructions    1. Call Dr. Fernanda Curtis at [phone] for any problems or questions. 2. Contact the doctor's office for follow up appointment as directed. 3. Medication may cause drowsiness for several hours, therefore, do not drive or operate machinery for remainder of the day. 4. No alcohol today. 5. Ordinarily, you may resume regular diet and activity after exam unless otherwise  specified by your physician. 6. For mild soreness in your throat you may use Cepacol throat lozenges or warm salt-water gargles as needed. Any additional instructions:  NEW PRESCRIPTION TAKE AS DIRECTED, AVOID ALCOHOL, OFFICE VISIT IN 2 MONTHS     Instructions given to Salima Stager. and other family members.   Instructions given by:  Gurwinder Kumar RN

## 2018-08-08 NOTE — OP NOTES
Gastroenterology Procedure Note              Procedure:  Esophagogastroduodenoscopy, Endoscopic ultrasound with Doppler     Date of Procedure:  8/8/2018    Patient:  Mary Alice Ramos.     1965    Indication:  LUQ pain, possible pancreatitis on imaging, cirrhosis, evaluate for esophageal varices    Sedation:  MAC    Pre-Procedure Physical Exam:    Mental status:  alert and oriented  Airway:  normal oropharyngeal airway and neck mobility  CV:  regular rate and rhythm  Respiratory:  clear to auscultation    Procedure:  A History and Physical has been performed, and patient medication allergies have been  reviewed. Risks of perforation, hemorrhage, adverse drug reaction, and aspiration were discussed. Informed consent was obtained for the procedure, including sedation. The patient was placed in the left lateral decubitus position. The heart rate, oxygen saturations, blood pressure, and response to care were monitored throughout the procedure. The linear echoendoscope was passed through the mouth and advanced under direct vision to the distal duodenum. As the scope was slowly withdrawn, detailed endoscopic images were obtained from the surrounding organs. The patient tolerated the procedure well. The gastroscope was passed through the mouth and advanced under direct vision to the second portion of the duodenum. A careful inspection was made as the gastroscope was withdrawn, including a retroflexed view of the proximal stomach. The patient tolerated the procedure well. Findings:     ENDOSCOPIC FINDINGS:      OROPHARYNX: Cords and pyriform recesses appear normal.   ESOPHAGUS: Grade 2 esophageal varices in the mid and distal esophagus. STOMACH: A single gastric varix is seen.  The body, antrum, and pylorus are normal.   DUODENUM: The bulb and second portions are normal. The ampulla is well-visualized endoscopically and appears normal.    PANCREAS:  The pancreas is well-visualized from head to tail. There are hyperechoic foci and stranding in the head and body, and patchy interstitial edematous changes in the pancreatic body. No cysts or masses are visualized. The main pancreatic duct is of normal caliber with a smooth, regular course, measuring up to 3 mm in the head, 2 mm in the body and 1 mm in the tail. Pancreas divisum is not seen. BILIARY TREE: The gallbladder appears normal. The common bile duct is well-visualized from its insertion in the ampulla to the bifurcation of right and left hepatic ducts. It is non-dilated, measuring up to 5 mm maximally with a gradual taper down to the level of the ampulla. There are no intraductal stones, sludge or debris. The ampulla appears normal endosonographically. OTHER ORGANS:  Views of the left lobe of the liver demonstrate heterogenous echotexture and a nodular contour. There is no ascites in the upper abdomen. The left adrenal gland appears normal. The portal vein is prominent. The splenic vessels and celiac artery appear unremarkable. There are no pathologically enlarged posterior mediastinal or upper abdominal lymph nodes. Specimen:  No    Estimated Blood Loss:  None    Implant:  None           Impression:    1. Gastric varix. 2. Grade 2 esophageal varices. 3. Mild pancreatic parenchymal abnormalities are consistent with resolving acute interstitial pancreatitis. 4. Cirrhosis of the liver. Plan:  1. Resume diet and current medications. 2. Nadolol 40 mg PO qhs.  3. Return to office in 1-2 months for ongoing care.     Signed:  Carolyn Reyes MD  8/8/2018  12:59 PM

## 2018-08-28 ENCOUNTER — DOCUMENTATION ONLY (OUTPATIENT)
Dept: NUTRITION | Age: 53
End: 2018-08-28

## 2018-08-28 NOTE — PROGRESS NOTES
Nutrition Counseling:  No further contact with patient. Referral closed.   Kadeem Pruitt, [id number] [address] [address]

## 2018-08-29 ENCOUNTER — ANESTHESIA EVENT (OUTPATIENT)
Dept: ENDOSCOPY | Age: 53
End: 2018-08-29
Payer: COMMERCIAL

## 2018-08-29 RX ORDER — OXYCODONE AND ACETAMINOPHEN 10; 325 MG/1; MG/1
1 TABLET ORAL AS NEEDED
Status: CANCELLED | OUTPATIENT
Start: 2018-08-29

## 2018-08-29 RX ORDER — HYDROMORPHONE HYDROCHLORIDE 2 MG/ML
0.5 INJECTION, SOLUTION INTRAMUSCULAR; INTRAVENOUS; SUBCUTANEOUS
Status: CANCELLED | OUTPATIENT
Start: 2018-08-29

## 2018-08-29 RX ORDER — OXYCODONE HYDROCHLORIDE 5 MG/1
5 TABLET ORAL
Status: CANCELLED | OUTPATIENT
Start: 2018-08-29 | End: 2018-08-30

## 2018-08-29 RX ORDER — SODIUM CHLORIDE 0.9 % (FLUSH) 0.9 %
5-10 SYRINGE (ML) INJECTION AS NEEDED
Status: CANCELLED | OUTPATIENT
Start: 2018-08-29

## 2018-08-30 ENCOUNTER — HOSPITAL ENCOUNTER (OUTPATIENT)
Age: 53
Setting detail: OUTPATIENT SURGERY
Discharge: HOME | End: 2018-08-30
Attending: INTERNAL MEDICINE | Admitting: INTERNAL MEDICINE
Payer: COMMERCIAL

## 2018-08-30 ENCOUNTER — ANESTHESIA (OUTPATIENT)
Dept: ENDOSCOPY | Age: 53
End: 2018-08-30
Payer: COMMERCIAL

## 2018-08-30 VITALS
HEIGHT: 71 IN | HEART RATE: 59 BPM | DIASTOLIC BLOOD PRESSURE: 70 MMHG | BODY MASS INDEX: 39.2 KG/M2 | RESPIRATION RATE: 16 BRPM | WEIGHT: 280 LBS | OXYGEN SATURATION: 97 % | TEMPERATURE: 98 F | SYSTOLIC BLOOD PRESSURE: 130 MMHG

## 2018-08-30 LAB — GLUCOSE BLD STRIP.AUTO-MCNC: 107 MG/DL (ref 65–100)

## 2018-08-30 PROCEDURE — 77030009426 HC FCPS BIOP ENDOSC BSC -B: Performed by: INTERNAL MEDICINE

## 2018-08-30 PROCEDURE — 74011250636 HC RX REV CODE- 250/636: Performed by: ANESTHESIOLOGY

## 2018-08-30 PROCEDURE — 76040000025: Performed by: INTERNAL MEDICINE

## 2018-08-30 PROCEDURE — 82962 GLUCOSE BLOOD TEST: CPT

## 2018-08-30 PROCEDURE — 88305 TISSUE EXAM BY PATHOLOGIST: CPT

## 2018-08-30 PROCEDURE — 74011250636 HC RX REV CODE- 250/636

## 2018-08-30 PROCEDURE — 76060000032 HC ANESTHESIA 0.5 TO 1 HR: Performed by: INTERNAL MEDICINE

## 2018-08-30 RX ORDER — PROPOFOL 10 MG/ML
INJECTION, EMULSION INTRAVENOUS AS NEEDED
Status: DISCONTINUED | OUTPATIENT
Start: 2018-08-30 | End: 2018-08-30 | Stop reason: HOSPADM

## 2018-08-30 RX ORDER — LIDOCAINE HYDROCHLORIDE 20 MG/ML
INJECTION, SOLUTION EPIDURAL; INFILTRATION; INTRACAUDAL; PERINEURAL AS NEEDED
Status: DISCONTINUED | OUTPATIENT
Start: 2018-08-30 | End: 2018-08-30 | Stop reason: HOSPADM

## 2018-08-30 RX ORDER — PROPOFOL 10 MG/ML
INJECTION, EMULSION INTRAVENOUS
Status: DISCONTINUED | OUTPATIENT
Start: 2018-08-30 | End: 2018-08-30 | Stop reason: HOSPADM

## 2018-08-30 RX ORDER — NADOLOL 20 MG/1
40 TABLET ORAL DAILY
COMMUNITY
End: 2018-09-18

## 2018-08-30 RX ORDER — SODIUM CHLORIDE, SODIUM LACTATE, POTASSIUM CHLORIDE, CALCIUM CHLORIDE 600; 310; 30; 20 MG/100ML; MG/100ML; MG/100ML; MG/100ML
75 INJECTION, SOLUTION INTRAVENOUS CONTINUOUS
Status: DISCONTINUED | OUTPATIENT
Start: 2018-08-30 | End: 2018-08-30 | Stop reason: HOSPADM

## 2018-08-30 RX ADMIN — PROPOFOL 250 MCG/KG/MIN: 10 INJECTION, EMULSION INTRAVENOUS at 09:54

## 2018-08-30 RX ADMIN — PROPOFOL 70 MG: 10 INJECTION, EMULSION INTRAVENOUS at 09:54

## 2018-08-30 RX ADMIN — LIDOCAINE HYDROCHLORIDE 100 MG: 20 INJECTION, SOLUTION EPIDURAL; INFILTRATION; INTRACAUDAL; PERINEURAL at 09:54

## 2018-08-30 RX ADMIN — SODIUM CHLORIDE, SODIUM LACTATE, POTASSIUM CHLORIDE, AND CALCIUM CHLORIDE 75 ML/HR: 600; 310; 30; 20 INJECTION, SOLUTION INTRAVENOUS at 09:08

## 2018-08-30 NOTE — H&P
History and Physical for Colonoscopy Date: 8/30/2018 History of Present Illness:  Patient presents to undergo colonoscopy. Past Medical History:  
Diagnosis Date  Cancer ([facility] [facility] skin CA  Diabetes ([facility] [address] [facility] type 2; dx 2017- avg fasting 130 -last A1C=6.2 on 4/16/18  GERD (gastroesophageal reflux disease)   
 controlled with med  Hoarseness  Hypertension   
 onset 2018  Laryngopharyngeal reflux  Liver disease \"fatty liver\"- enzymes high- Dr Jonathan Magana said it was \"cirrotic\"  Morbid obesity ([facility] [address] [facility] BMI- 39  
 Obstructive sleep apnea on CPAP 7/18/2016 Past Surgical History:  
Procedure Laterality Date  HX HEENT    
 nasal surg  HX HEENT Sinus Surg x2  HX ORTHOPAEDIC Right 2008  
 wrist fx  HX ORTHOPAEDIC Left 2017  
 wrist fx  HX OTHER SURGICAL Wrist surgery Family History Problem Relation Age of Onset  Cancer Mother  Diabetes Father  Dementia Father  Hypertension Father Social History Substance Use Topics  Smoking status: Never Smoker  Smokeless tobacco: Never Used  Alcohol use Yes Comment: occ No Known Allergies No current facility-administered medications for this encounter. Current Outpatient Prescriptions Medication Sig  
 metFORMIN ER (GLUCOPHAGE XR) 500 mg tablet TAKE 1 TABLET BY MOUTH DAILY (WITH DINNER).  atorvastatin (LIPITOR) 10 mg tablet TAKE 1 TABLET BY MOUTH DAILY.  naproxen sodium (ALEVE) 220 mg cap Take  by mouth as needed. LAST DOSE 8/5/18  esomeprazole (NEXIUM) 20 mg capsule Take 20 mg by mouth daily.  lisinopril (PRINIVIL, ZESTRIL) 10 mg tablet Take 1 Tab by mouth daily.  venlafaxine-SR (EFFEXOR-XR) 75 mg capsule TAKE 3 CAPSULES BY MOUTH DAILY. (Patient taking differently: TAKE 1 CAPSULES BY MOUTH DAILY. )  
 fluticasone (FLONASE) 50 mcg/actuation nasal spray 2 Sprays by Both Nostrils route daily.  (Patient taking differently: 2 Sprays by Both Nostrils route daily as needed.)  potassium 99 mg tablet Take 99 mg by mouth daily. Review of Systems: A detailed 10 organ review of systems is obtained with pertinent positives as listed in the History of Present Illness. All others are negative. Objective:  
 
Physical Exam: 
There were no vitals taken for this visit. General:  Alert and oriented. Heart: Regular rate and rhythm Lungs:  Clear to auscultation bilaterally Abdomen: Soft, nontender, nondistended Impression/Plan:  
 
Proceed with colonoscopy as planned. I have discussed with the patient the technique, benefits, alternatives, and risks of these procedures, including medication reaction, immediate or delayed bleeding, or perforation of the gastrointestinal tract. Signed By: Rosetta Otero MD   
 August 30, 2018

## 2018-08-30 NOTE — ANESTHESIA POSTPROCEDURE EVALUATION
Post-Anesthesia Evaluation and Assessment Patient: Dayron Lemus MRN: [id number]  SSN: [id number] YOB: 1965  Age: 47 y.o. Sex: male Cardiovascular Function/Vital Signs Visit Vitals  /79  Pulse 68  Temp 36.7 °C (98 °F)  Resp 12  Ht 5' 11\" (1.803 m)  Wt 127 kg (280 lb)  SpO2 99%  BMI 39.05 kg/m2 Patient is status post total IV anesthesia anesthesia for Procedure(s): 
COLONOSCOPY/ 40 
ENDOSCOPIC POLYPECTOMY. Nausea/Vomiting: None Postoperative hydration reviewed and adequate. Pain: 
Pain Scale 1: Visual (08/30/18 1027) Pain Intensity 1: 0 (08/30/18 1027) Managed Neurological Status: At baseline Mental Status and Level of Consciousness: Arousable Pulmonary Status:  
O2 Device: Nasal cannula (08/30/18 1028) Adequate oxygenation and airway patent Complications related to anesthesia: None Post-anesthesia assessment completed. No concerns Signed By: Garret Elizabeth MD   
 August 30, 2018

## 2018-08-30 NOTE — DISCHARGE INSTRUCTIONS
Gastrointestinal Colonoscopy/Flexible Sigmoidoscopy - Lower Exam Discharge Instructions  1. Call Dr. David Medina at [phone] for any problems or questions. 2. Contact the doctors office for follow up appointment as directed  3. Medication may cause drowsiness for several hours, therefore, do not drive or operate machinery for remainder of the day. 4. No alcohol today. 5. Ordinarily, you may resume regular diet and activity after exam unless otherwise specified by your physician. 6. Because of air put into your colon during exam, you may experience some abdominal distension, relieved by the passage of gas, for several hours. 7. Contact your physician if you have any of the following:  a. Excessive amount of bleeding - large amount when having a bowel movement. Occasional specks of blood with bowel movement would not be unusual.  b. Severe abdominal pain  c. Fever or Chills  8. Polyp Removal - follow these additional instructions  a. Clear liquid diet for the next meal (jell-o, broth, clear drinks)  b. Soft diet for 24 hours, then resume regular diet   c. Take Metamucil - 1 tablespoon in juice every morning for 3 days  d. No Aspirin, Advil, Aleve, Nuprin, Ibuprofen, or medications that contain these drugs for 2 weeks. Any additional instructions:    -Return to office in 1-2 months. Call [phone] to schedule an appointment. Instructions given to Rohit Alexandre. and other family members.   Instructions given by:  Zachary Thompson RN

## 2018-08-30 NOTE — ANESTHESIA PREPROCEDURE EVALUATION
Anesthetic History Review of Systems / Medical History Patient summary reviewed and pertinent labs reviewed Pulmonary Neuro/Psych Cardiovascular Hypertension GI/Hepatic/Renal 
  
 
 
 
Liver disease Comments: LANDON Endo/Other Diabetes: type 2 Morbid obesity Other Findings Physical Exam 
 
Airway Mallampati: II 
TM Distance: > 6 cm Neck ROM: normal range of motion Mouth opening: Normal 
 
 Cardiovascular Rhythm: regular Dental 
 
 
  
Pulmonary Abdominal 
GI exam deferred Other Findings Anesthetic Plan ASA: 3 Anesthesia type: total IV anesthesia Induction: Intravenous Anesthetic plan and risks discussed with: Patient

## 2018-08-30 NOTE — ROUTINE PROCESS
Patient discharged. Passing flatus. Refused po fluids. No acute distress noted. Escorted to car, with family by Rio Andrew RN.

## 2018-08-30 NOTE — OP NOTES
Gastroenterology Procedure Note           Procedure:  Colonoscopy    Date of Procedure:  8/30/2018    Patient:  Michael Arciniega.     1965    Indication:  History of colon polyps     Sedation:  MAC    Pre-Procedure Exam:    Mental status:  alert and oriented  Airway:  normal oropharyngeal airway and neck mobility  CV:  regular rate and rhythm   Respiratory:  clear to auscultation    Procedure:  A History and Physical has been performed, and patient medication allergies have been  reviewed. Risks of perforation, hemorrhage, adverse drug reaction, and aspiration were discussed. Informed consent was obtained for the procedure, including sedation. The patient was placed in the left lateral decubitus position. The heart rate, oxygen saturations, blood pressure, and response to care were monitored throughout the procedure. After performing a rectal exam, the colonoscope was passed through the anus and advanced under direct vision to the cecum, identified by appendiceal orifice and ileocecal valve. The quality of prep was adequate. A careful inspection was made as the colonoscope was withdrawn, including a retroflexed view of the rectum. The patient tolerated the procedure well. Findings:     ANUS:  Anal exam reveals no masses or hemorrhoids. RECTUM:  Rectal exam reveals no masses or hemorrhoids. SIGMOID COLON:  The mucosa is normal with good vascular pattern and without ulcers, diverticula, and polyps. DESCENDING COLON:  The mucosa is normal with good vascular pattern and without ulcers, diverticula, and polyps. SPLENIC FLEXURE:  The splenic flexure is normal.   TRANSVERSE COLON:  Two 3 mm sessile polyps were removed using a cold biopsy forceps. HEPATIC FLEXURE:  The hepatic flexure is normal.   ASCENDING COLON:  One 4 mm sessile polyp was removed using a cold biopsy forceps.   CECUM:  The appendiceal orifice appears normal. The ileocecal valve appears normal.   TERMINAL ILEUM:  The terminal ileum was not entered. Specimen:  Yes    Estimated Blood Loss:  Minimal    Implant:  None           Impression:    Colon polyps. Plan:  1. Follow up pathology results. 2. Repeat colonoscopy for surveillance based on pathology results. 3. Return to office in 2 months.     Signed:  Arletha Goldmann, MD  8/30/2018  10:21 AM

## 2018-09-18 PROBLEM — K75.81 LIVER CIRRHOSIS SECONDARY TO NASH (NONALCOHOLIC STEATOHEPATITIS) (HCC): Status: ACTIVE | Noted: 2018-09-18

## 2018-09-18 PROBLEM — K74.60 LIVER CIRRHOSIS SECONDARY TO NASH (NONALCOHOLIC STEATOHEPATITIS) (HCC): Status: ACTIVE | Noted: 2018-09-18

## 2018-09-18 PROBLEM — I86.4 ESOPHAGEAL AND GASTRIC VARICES (HCC): Status: ACTIVE | Noted: 2018-09-18

## 2018-09-18 PROBLEM — I85.00 ESOPHAGEAL AND GASTRIC VARICES (HCC): Status: ACTIVE | Noted: 2018-09-18

## 2018-09-18 PROBLEM — F32.A MILD DEPRESSION: Status: ACTIVE | Noted: 2018-09-18

## 2018-11-29 RX ORDER — SODIUM CHLORIDE 0.9 % (FLUSH) 0.9 %
5-10 SYRINGE (ML) INJECTION AS NEEDED
Status: CANCELLED | OUTPATIENT
Start: 2018-11-29

## 2018-11-29 RX ORDER — SODIUM CHLORIDE 0.9 % (FLUSH) 0.9 %
5-10 SYRINGE (ML) INJECTION EVERY 8 HOURS
Status: CANCELLED | OUTPATIENT
Start: 2018-11-29

## 2018-12-01 PROBLEM — M22.41 CHONDROMALACIA OF PATELLOFEMORAL JOINT, RIGHT: Status: ACTIVE | Noted: 2018-12-01

## 2018-12-01 PROBLEM — M94.261 CHONDROMALACIA OF RIGHT KNEE: Status: ACTIVE | Noted: 2018-12-01

## 2018-12-01 PROBLEM — S83.232A COMPLEX TEAR OF MEDIAL MENISCUS OF LEFT KNEE: Status: ACTIVE | Noted: 2018-12-01

## 2018-12-01 PROBLEM — M22.42 CHONDROMALACIA OF PATELLOFEMORAL JOINT, LEFT: Status: ACTIVE | Noted: 2018-12-01

## 2018-12-01 PROBLEM — M25.561 RIGHT KNEE PAIN: Status: ACTIVE | Noted: 2018-12-01

## 2018-12-01 PROBLEM — M25.511 RIGHT SHOULDER PAIN: Status: ACTIVE | Noted: 2018-12-01

## 2019-02-14 RX ORDER — SODIUM CHLORIDE 0.9 % (FLUSH) 0.9 %
5-40 SYRINGE (ML) INJECTION AS NEEDED
Status: CANCELLED | OUTPATIENT
Start: 2019-02-14

## 2019-02-14 RX ORDER — SODIUM CHLORIDE 0.9 % (FLUSH) 0.9 %
5-40 SYRINGE (ML) INJECTION EVERY 8 HOURS
Status: CANCELLED | OUTPATIENT
Start: 2019-02-14

## 2019-02-22 VITALS — BODY MASS INDEX: 39.2 KG/M2 | WEIGHT: 280 LBS | HEIGHT: 71 IN

## 2019-02-22 PROBLEM — S83.232A COMPLEX TEAR OF MEDIAL MENISCUS OF LEFT KNEE: Status: ACTIVE | Noted: 2019-02-22

## 2019-02-22 PROBLEM — S83.232A COMPLEX TEAR OF MEDIAL MENISCUS OF LEFT KNEE: Status: RESOLVED | Noted: 2018-12-01 | Resolved: 2019-02-22

## 2019-02-22 PROBLEM — M25.511 RIGHT SHOULDER PAIN: Status: ACTIVE | Noted: 2019-02-22

## 2019-02-22 PROBLEM — M25.511 RIGHT SHOULDER PAIN: Status: RESOLVED | Noted: 2018-12-01 | Resolved: 2019-02-22

## 2019-02-22 PROBLEM — M25.561 RIGHT KNEE PAIN: Status: RESOLVED | Noted: 2018-12-01 | Resolved: 2019-02-22

## 2019-02-22 PROBLEM — M22.42 CHONDROMALACIA OF LEFT PATELLA: Status: ACTIVE | Noted: 2019-02-22

## 2019-02-22 PROBLEM — M25.561 RIGHT KNEE PAIN: Status: ACTIVE | Noted: 2019-02-22

## 2019-02-22 NOTE — H&P
[facility] HISTORY AND PHYSICAL Subjective:  
 
Patient is a 47 y.o. MALE WITH LEFT KNEE PAIN. SEE OFFICE NOTE. Patient Active Problem List  
 Diagnosis Date Noted  Complex tear of medial meniscus of left knee 02/22/2019  Chondromalacia of left patella 02/22/2019  Right shoulder pain 02/22/2019  Right knee pain 02/22/2019  Chondromalacia of patellofemoral joint, left 12/01/2018  Chondromalacia of patellofemoral joint, right 12/01/2018  Chondromalacia of right knee 12/01/2018  Mild depression ([address] 09/18/2018  Liver cirrhosis secondary to LANDON (nonalcoholic steatohepatitis) ([address] 09/18/2018  Esophageal and gastric varices ([address] 09/18/2018  Severe obesity (BMI 35.0-39. 9) with comorbidity ([address] 04/16/2018  Fatty infiltration of liver 04/16/2018  Thrombocytopenia ([address] 04/16/2018  Controlled type 2 diabetes mellitus without complication, without long-term current use of insulin ([address] 04/16/2018  Anxiety 04/16/2018  Essential hypertension 12/13/2017  Hypercholesterolemia 12/13/2017  Allergic rhinitis 08/18/2016  
 Hoarseness  Laryngopharyngeal reflux  Obstructive sleep apnea on CPAP 07/18/2016  Gastroesophageal reflux disease without esophagitis 05/19/2016 Past Medical History:  
Diagnosis Date  Cancer ([address] skin CA  Diabetes ([address] type 2; dx 2017- avg fasting 130 -last A1C=6.2 on 4/16/18  Esophageal varices ([address]   
 controlled with med  GERD (gastroesophageal reflux disease)   
 controlled with med  Hoarseness  Hypertension   
 onset 2018  Laryngopharyngeal reflux  Liver disease \"fatty liver-\"elevated enzymes: monitored by GI and PCP  Mild depression ([address] 9/18/2018  Morbid obesity ([address] BMI- 44  
 LANDON (nonalcoholic steatohepatitis)  Obstructive sleep apnea on CPAP 7/18/2016  Thrombocytopenia ([address] Past Surgical History:  
Procedure Laterality Date  COLONOSCOPY N/A 8/30/2018 COLONOSCOPY/ 40 performed by Kaycee Garcia MD at [facility] ENDOSCOPY  
 HX HEENT    
 nasal surg  HX HEENT Sinus Surg x2  HX ORTHOPAEDIC Right 2008  
 wrist fx  HX ORTHOPAEDIC Left 2017  
 wrist fx  HX OTHER SURGICAL Wrist surgery Prior to Admission medications Medication Sig Start Date End Date Taking? Authorizing Provider  
cyanocobalamin 1,000 mcg tablet Take 1,000 mcg by mouth daily. Provider, Historical  
lisinopril (PRINIVIL, ZESTRIL) 10 mg tablet TAKE 1 TAB BY MOUTH DAILY. 12/14/18   Aida Herzog MD  
lactulose (CHRONULAC) 10 gram/15 mL solution Take  by mouth as needed. Provider, Historical  
nadolol (CORGARD) 40 mg tablet Take 40 mg by mouth nightly. 9/6/18   Provider, Historical  
venlafaxine-SR (EFFEXOR-XR) 75 mg capsule Take 1 Cap by mouth daily. 9/18/18   Aida Herzog MD  
metFORMIN ER (GLUCOPHAGE XR) 500 mg tablet TAKE 1 TABLET BY MOUTH DAILY (WITH DINNER). Patient taking differently: TAKE 1 TABLET BY MOUTH DAILY 8/13/18   Catalina Bain, SERENITY  
atorvastatin (LIPITOR) 10 mg tablet TAKE 1 TABLET BY MOUTH DAILY. 8/13/18   Catalina Bain NP  
esomeprazole (NEXIUM) 20 mg capsule Take 20 mg by mouth daily. Provider, Historical  
fluticasone (FLONASE) 50 mcg/actuation nasal spray 2 Sprays by Both Nostrils route daily. Patient taking differently: 2 Sprays by Both Nostrils route daily as needed. 8/18/16   Aida Herzog MD  
potassium 99 mg tablet Take 99 mg by mouth daily. Provider, Historical  
 
No Known Allergies Social History Tobacco Use  Smoking status: Never Smoker  Smokeless tobacco: Never Used Substance Use Topics  Alcohol use: No  
  
Family History Problem Relation Age of Onset  Cancer Mother  Diabetes Father  Dementia Father  Hypertension Father Review of Systems A comprehensive review of systems was negative except for that written in the HPI. Objective:  
 
No data found. There were no vitals taken for this visit. General:  Alert, cooperative, no distress, appears stated age. Head:  Normocephalic, without obvious abnormality, atraumatic. Eyes:  Conjunctivae/corneas clear. PERRL, EOMs intact. Fundi benign Back:   Symmetric, no curvature. ROM normal. No CVA tenderness. Lungs:   Clear to auscultation bilaterally. Chest wall:  No tenderness or deformity. Heart:  Regular rate and rhythm, S1, S2 normal, no murmur, click, rub or gallop. Extremities: Extremities normal, atraumatic, no cyanosis or edema. Pulses: 2+ and symmetric all extremities. Skin: Skin color, texture, turgor normal. No rashes or lesions Lymph nodes: Cervical, supraclavicular, and axillary nodes normal.  
Neurologic: CNII-XII intact. Normal strength, sensation and reflexes throughout. Assessment:  
 
Active Problems: 
  Complex tear of medial meniscus of left knee (2/22/2019) Chondromalacia of left patella (2/22/2019) Right shoulder pain (2/22/2019) Right knee pain (2/22/2019) Plan: The various methods of treatment have been discussed with the patient and family. PATIENT HAS EXHAUSTED NON-OPERATIVE MODALITIES After consideration of risks, benefits and other options for treatment, the patient has consented to surgical intervention. SEE OFFICE NOTE Lisy Tate MD

## 2019-02-22 NOTE — PERIOP NOTES
Patient verified name and . Order for consent  found in EHR and matches case posting; patient verifies procedure. Abnormal labs noted in EMR dated 19: anesthesia review along with hematologist note from 18. Type 1B surgery, Phone assessment complete. Orders  received. Labs per surgeon: none. Labs per anesthesia protocol: none. Patient answered medical/surgical history questions at their best of ability. All prior to admission medications documented in [facility]. Patient instructed to take the following medications the day of surgery according to anesthesia guidelines with a small sip of water: nexium, effexor, lipitor . Hold all vitamins 7 days prior to surgery and NSAIDS 5 days prior to surgery. Prescription meds to hold:none. Patient instructed on the following:  Arrive at A Entrance, time of arrival to be called the day before by 1700  NPO after midnight including gum, mints, and ice chips  Responsible adult must drive patient to the hospital, stay during surgery, and patient will need supervision 24 hours after anesthesia  Use dial soap in shower the night before surgery and on the morning of surgery  All piercings must be removed prior to arrival.    Leave all valuables (money and jewelry) at home but bring insurance card and ID on       DOS. Do not wear make-up, nail polish, lotions, cologne, perfumes, powders, or oil on skin. Patient teach back successful and patient demonstrates knowledge of instruction.

## 2019-02-22 NOTE — BRIEF OP NOTE
BRIEF OPERATIVE NOTE Date of Procedure: 3/1/2019 Preoperative Diagnosis:  MEDIAL MENISCAL TEAR LEFT KNEE PATELLOFEMORAL CHONDROMALACIA LEFT KNEE 
 
    RIGHT KNEE PAIN 
 
    RIGHT SHOULDER PAIN Postoperative Diagnosis:  MEDIAL MENISCAL TEAR LEFT KNEE 
    LATERAL MENISCAL TEAR LEFT KNEE 
    OA LEFT KNEE 
 
    RIGHT KNEE PAIN 
     
    RIGHT SHOULDER PAIN Procedure(s): 1. RIGHT SHOULDER INJECTION 2.  RIGHT KNEE INJECTION 3. ARTHROSCOPY LEFT KNEE PARTIAL MEDIAL AND LATERAL MENISECTOMIES Surgeon(s) and Role: * Chikis Velásquez MD - Primary Assistant Staff: None Surgical Staff: 
Circ-1: (Unknown) Scrub Tech-1: (Unknown) Scrub Tech-2: (Unknown) Scrub Tech-3: (Unknown) No case tracking events are documented in the log. Anesthesia:  GENERAL Estimated Blood Loss: 10 CC. Complications: NONE Luz Marina Irene MD

## 2019-02-25 ENCOUNTER — HOSPITAL ENCOUNTER (OUTPATIENT)
Dept: SURGERY | Age: 54
Discharge: HOME | End: 2019-02-25

## 2019-02-25 NOTE — PERIOP NOTES
Dr. Abdullahi Vu, anesthesia, reviewed hematology note (5/29/18) and PLT (1/29/19). Ordered recheck PLT DOS. Order entered PLT level DOS. Order entered in EMR.

## 2019-02-28 ENCOUNTER — ANESTHESIA EVENT (OUTPATIENT)
Dept: SURGERY | Age: 54
End: 2019-02-28
Payer: COMMERCIAL

## 2019-03-01 ENCOUNTER — ANESTHESIA (OUTPATIENT)
Dept: SURGERY | Age: 54
End: 2019-03-01
Payer: COMMERCIAL

## 2019-03-01 ENCOUNTER — HOSPITAL ENCOUNTER (OUTPATIENT)
Age: 54
Setting detail: OUTPATIENT SURGERY
Discharge: HOME | End: 2019-03-01
Attending: ORTHOPAEDIC SURGERY | Admitting: ORTHOPAEDIC SURGERY
Payer: COMMERCIAL

## 2019-03-01 VITALS
RESPIRATION RATE: 16 BRPM | TEMPERATURE: 97 F | OXYGEN SATURATION: 93 % | SYSTOLIC BLOOD PRESSURE: 124 MMHG | BODY MASS INDEX: 39.05 KG/M2 | HEIGHT: 71 IN | DIASTOLIC BLOOD PRESSURE: 80 MMHG | HEART RATE: 62 BPM

## 2019-03-01 PROBLEM — M17.12 OSTEOARTHRITIS OF LEFT KNEE: Status: ACTIVE | Noted: 2019-03-01

## 2019-03-01 PROBLEM — S83.282A TEAR OF LATERAL MENISCUS OF LEFT KNEE: Status: ACTIVE | Noted: 2019-03-01

## 2019-03-01 PROBLEM — M22.42 CHONDROMALACIA OF LEFT PATELLA: Status: RESOLVED | Noted: 2019-02-22 | Resolved: 2019-03-01

## 2019-03-01 LAB
EST. AVERAGE GLUCOSE BLD GHB EST-MCNC: 131 MG/DL
GLUCOSE BLD STRIP.AUTO-MCNC: 118 MG/DL (ref 65–100)
HBA1C MFR BLD: 6.2 %
PLATELET # BLD AUTO: 72 K/UL

## 2019-03-01 PROCEDURE — 77030002916 HC SUT ETHLN J&J -A: Performed by: ORTHOPAEDIC SURGERY

## 2019-03-01 PROCEDURE — 76210000020 HC REC RM PH II FIRST 0.5 HR: Performed by: ORTHOPAEDIC SURGERY

## 2019-03-01 PROCEDURE — 77030027384 HC PRB ARTHSCP SERFAS STRY -C: Performed by: ORTHOPAEDIC SURGERY

## 2019-03-01 PROCEDURE — 76010000138 HC OR TIME 0.5 TO 1 HR: Performed by: ORTHOPAEDIC SURGERY

## 2019-03-01 PROCEDURE — 77030006668 HC BLD SHV MENSCS STRY -B: Performed by: ORTHOPAEDIC SURGERY

## 2019-03-01 PROCEDURE — 76060000032 HC ANESTHESIA 0.5 TO 1 HR: Performed by: ORTHOPAEDIC SURGERY

## 2019-03-01 PROCEDURE — 74011250637 HC RX REV CODE- 250/637: Performed by: ANESTHESIOLOGY

## 2019-03-01 PROCEDURE — 82962 GLUCOSE BLOOD TEST: CPT

## 2019-03-01 PROCEDURE — 74011250636 HC RX REV CODE- 250/636: Performed by: ANESTHESIOLOGY

## 2019-03-01 PROCEDURE — 74011250636 HC RX REV CODE- 250/636: Performed by: ORTHOPAEDIC SURGERY

## 2019-03-01 PROCEDURE — 74011250636 HC RX REV CODE- 250/636

## 2019-03-01 PROCEDURE — 83036 HEMOGLOBIN GLYCOSYLATED A1C: CPT

## 2019-03-01 PROCEDURE — 74011000250 HC RX REV CODE- 250: Performed by: ORTHOPAEDIC SURGERY

## 2019-03-01 PROCEDURE — 77030018836 HC SOL IRR NACL ICUM -A: Performed by: ORTHOPAEDIC SURGERY

## 2019-03-01 PROCEDURE — 77030006891 HC BLD SHV RESECT STRY -B: Performed by: ORTHOPAEDIC SURGERY

## 2019-03-01 PROCEDURE — 77030022036 HC BLD SHV TOMCAT STRY -B: Performed by: ORTHOPAEDIC SURGERY

## 2019-03-01 PROCEDURE — 85049 AUTOMATED PLATELET COUNT: CPT

## 2019-03-01 PROCEDURE — 77030033005 HC TBNG ARTHSC PMP STRY -B: Performed by: ORTHOPAEDIC SURGERY

## 2019-03-01 PROCEDURE — 77030012935 HC DRSG AQUACEL BMS -B: Performed by: ORTHOPAEDIC SURGERY

## 2019-03-01 PROCEDURE — 77030020782 HC GWN BAIR PAWS FLX 3M -B: Performed by: NURSE ANESTHETIST, CERTIFIED REGISTERED

## 2019-03-01 PROCEDURE — 76210000016 HC OR PH I REC 1 TO 1.5 HR: Performed by: ORTHOPAEDIC SURGERY

## 2019-03-01 PROCEDURE — 77030000032 HC CUF TRNQT ZIMM -B: Performed by: ORTHOPAEDIC SURGERY

## 2019-03-01 PROCEDURE — 77030010509 HC AIRWY LMA MSK TELE -A: Performed by: NURSE ANESTHETIST, CERTIFIED REGISTERED

## 2019-03-01 PROCEDURE — 77030003666 HC NDL SPINAL BD -A: Performed by: ORTHOPAEDIC SURGERY

## 2019-03-01 PROCEDURE — 74011000250 HC RX REV CODE- 250

## 2019-03-01 RX ORDER — SODIUM CHLORIDE 9 MG/ML
50 INJECTION, SOLUTION INTRAVENOUS CONTINUOUS
Status: DISCONTINUED | OUTPATIENT
Start: 2019-03-01 | End: 2019-03-01 | Stop reason: HOSPADM

## 2019-03-01 RX ORDER — KETOROLAC TROMETHAMINE 30 MG/ML
INJECTION, SOLUTION INTRAMUSCULAR; INTRAVENOUS AS NEEDED
Status: DISCONTINUED | OUTPATIENT
Start: 2019-03-01 | End: 2019-03-01 | Stop reason: HOSPADM

## 2019-03-01 RX ORDER — MIDAZOLAM HYDROCHLORIDE 1 MG/ML
2 INJECTION, SOLUTION INTRAMUSCULAR; INTRAVENOUS
Status: DISCONTINUED | OUTPATIENT
Start: 2019-03-01 | End: 2019-03-01 | Stop reason: HOSPADM

## 2019-03-01 RX ORDER — SODIUM CHLORIDE 0.9 % (FLUSH) 0.9 %
5-40 SYRINGE (ML) INJECTION AS NEEDED
Status: DISCONTINUED | OUTPATIENT
Start: 2019-03-01 | End: 2019-03-01 | Stop reason: HOSPADM

## 2019-03-01 RX ORDER — LIDOCAINE HYDROCHLORIDE AND EPINEPHRINE 10; 10 MG/ML; UG/ML
INJECTION, SOLUTION INFILTRATION; PERINEURAL AS NEEDED
Status: DISCONTINUED | OUTPATIENT
Start: 2019-03-01 | End: 2019-03-01 | Stop reason: HOSPADM

## 2019-03-01 RX ORDER — LIDOCAINE HYDROCHLORIDE 10 MG/ML
0.1 INJECTION INFILTRATION; PERINEURAL AS NEEDED
Status: DISCONTINUED | OUTPATIENT
Start: 2019-03-01 | End: 2019-03-01 | Stop reason: HOSPADM

## 2019-03-01 RX ORDER — SODIUM CHLORIDE 0.9 % (FLUSH) 0.9 %
5-40 SYRINGE (ML) INJECTION EVERY 8 HOURS
Status: DISCONTINUED | OUTPATIENT
Start: 2019-03-01 | End: 2019-03-01 | Stop reason: HOSPADM

## 2019-03-01 RX ORDER — ONDANSETRON 2 MG/ML
INJECTION INTRAMUSCULAR; INTRAVENOUS AS NEEDED
Status: DISCONTINUED | OUTPATIENT
Start: 2019-03-01 | End: 2019-03-01 | Stop reason: HOSPADM

## 2019-03-01 RX ORDER — EPHEDRINE SULFATE 50 MG/ML
INJECTION, SOLUTION INTRAVENOUS AS NEEDED
Status: DISCONTINUED | OUTPATIENT
Start: 2019-03-01 | End: 2019-03-01 | Stop reason: HOSPADM

## 2019-03-01 RX ORDER — HYDROMORPHONE HYDROCHLORIDE 2 MG/ML
0.5 INJECTION, SOLUTION INTRAMUSCULAR; INTRAVENOUS; SUBCUTANEOUS
Status: DISCONTINUED | OUTPATIENT
Start: 2019-03-01 | End: 2019-03-01 | Stop reason: HOSPADM

## 2019-03-01 RX ORDER — OXYCODONE AND ACETAMINOPHEN 5; 325 MG/1; MG/1
1 TABLET ORAL AS NEEDED
Status: DISCONTINUED | OUTPATIENT
Start: 2019-03-01 | End: 2019-03-01 | Stop reason: HOSPADM

## 2019-03-01 RX ORDER — FENTANYL CITRATE 50 UG/ML
INJECTION, SOLUTION INTRAMUSCULAR; INTRAVENOUS AS NEEDED
Status: DISCONTINUED | OUTPATIENT
Start: 2019-03-01 | End: 2019-03-01 | Stop reason: HOSPADM

## 2019-03-01 RX ORDER — SODIUM CHLORIDE, SODIUM LACTATE, POTASSIUM CHLORIDE, CALCIUM CHLORIDE 600; 310; 30; 20 MG/100ML; MG/100ML; MG/100ML; MG/100ML
100 INJECTION, SOLUTION INTRAVENOUS CONTINUOUS
Status: DISCONTINUED | OUTPATIENT
Start: 2019-03-01 | End: 2019-03-01 | Stop reason: HOSPADM

## 2019-03-01 RX ORDER — DEXAMETHASONE SODIUM PHOSPHATE 100 MG/10ML
INJECTION INTRAMUSCULAR; INTRAVENOUS AS NEEDED
Status: DISCONTINUED | OUTPATIENT
Start: 2019-03-01 | End: 2019-03-01 | Stop reason: HOSPADM

## 2019-03-01 RX ORDER — MIDAZOLAM HYDROCHLORIDE 1 MG/ML
2 INJECTION, SOLUTION INTRAMUSCULAR; INTRAVENOUS ONCE
Status: COMPLETED | OUTPATIENT
Start: 2019-03-01 | End: 2019-03-01

## 2019-03-01 RX ORDER — PROPOFOL 10 MG/ML
INJECTION, EMULSION INTRAVENOUS AS NEEDED
Status: DISCONTINUED | OUTPATIENT
Start: 2019-03-01 | End: 2019-03-01 | Stop reason: HOSPADM

## 2019-03-01 RX ORDER — FAMOTIDINE 20 MG/1
20 TABLET, FILM COATED ORAL ONCE
Status: COMPLETED | OUTPATIENT
Start: 2019-03-01 | End: 2019-03-01

## 2019-03-01 RX ORDER — ROPIVACAINE HYDROCHLORIDE 5 MG/ML
INJECTION, SOLUTION EPIDURAL; INFILTRATION; PERINEURAL AS NEEDED
Status: DISCONTINUED | OUTPATIENT
Start: 2019-03-01 | End: 2019-03-01 | Stop reason: HOSPADM

## 2019-03-01 RX ORDER — LIDOCAINE HYDROCHLORIDE 20 MG/ML
INJECTION, SOLUTION EPIDURAL; INFILTRATION; INTRACAUDAL; PERINEURAL AS NEEDED
Status: DISCONTINUED | OUTPATIENT
Start: 2019-03-01 | End: 2019-03-01 | Stop reason: HOSPADM

## 2019-03-01 RX ORDER — DIPHENHYDRAMINE HYDROCHLORIDE 50 MG/ML
12.5 INJECTION, SOLUTION INTRAMUSCULAR; INTRAVENOUS ONCE
Status: DISCONTINUED | OUTPATIENT
Start: 2019-03-01 | End: 2019-03-01 | Stop reason: HOSPADM

## 2019-03-01 RX ORDER — OXYCODONE HYDROCHLORIDE 5 MG/1
5 TABLET ORAL
Status: DISCONTINUED | OUTPATIENT
Start: 2019-03-01 | End: 2019-03-01 | Stop reason: HOSPADM

## 2019-03-01 RX ORDER — FENTANYL CITRATE 50 UG/ML
25 INJECTION, SOLUTION INTRAMUSCULAR; INTRAVENOUS ONCE
Status: DISCONTINUED | OUTPATIENT
Start: 2019-03-01 | End: 2019-03-01 | Stop reason: HOSPADM

## 2019-03-01 RX ADMIN — FENTANYL CITRATE 25 MCG: 50 INJECTION, SOLUTION INTRAMUSCULAR; INTRAVENOUS at 07:58

## 2019-03-01 RX ADMIN — PROPOFOL 200 MG: 10 INJECTION, EMULSION INTRAVENOUS at 07:16

## 2019-03-01 RX ADMIN — Medication 3 G: at 07:14

## 2019-03-01 RX ADMIN — HYDROMORPHONE HYDROCHLORIDE 0.5 MG: 2 INJECTION, SOLUTION INTRAMUSCULAR; INTRAVENOUS; SUBCUTANEOUS at 08:11

## 2019-03-01 RX ADMIN — SODIUM CHLORIDE, SODIUM LACTATE, POTASSIUM CHLORIDE, AND CALCIUM CHLORIDE 100 ML/HR: 600; 310; 30; 20 INJECTION, SOLUTION INTRAVENOUS at 06:09

## 2019-03-01 RX ADMIN — MIDAZOLAM 2 MG: 1 INJECTION INTRAMUSCULAR; INTRAVENOUS at 07:06

## 2019-03-01 RX ADMIN — FENTANYL CITRATE 25 MCG: 50 INJECTION, SOLUTION INTRAMUSCULAR; INTRAVENOUS at 07:40

## 2019-03-01 RX ADMIN — KETOROLAC TROMETHAMINE 30 MG: 30 INJECTION, SOLUTION INTRAMUSCULAR; INTRAVENOUS at 07:51

## 2019-03-01 RX ADMIN — FAMOTIDINE 20 MG: 20 TABLET, FILM COATED ORAL at 06:09

## 2019-03-01 RX ADMIN — LIDOCAINE HYDROCHLORIDE 100 MG: 20 INJECTION, SOLUTION EPIDURAL; INFILTRATION; INTRACAUDAL; PERINEURAL at 07:16

## 2019-03-01 RX ADMIN — SODIUM CHLORIDE, SODIUM LACTATE, POTASSIUM CHLORIDE, AND CALCIUM CHLORIDE: 600; 310; 30; 20 INJECTION, SOLUTION INTRAVENOUS at 07:58

## 2019-03-01 RX ADMIN — ONDANSETRON 4 MG: 2 INJECTION INTRAMUSCULAR; INTRAVENOUS at 07:51

## 2019-03-01 RX ADMIN — FENTANYL CITRATE 50 MCG: 50 INJECTION, SOLUTION INTRAMUSCULAR; INTRAVENOUS at 07:16

## 2019-03-01 RX ADMIN — HYDROMORPHONE HYDROCHLORIDE 0.5 MG: 2 INJECTION, SOLUTION INTRAMUSCULAR; INTRAVENOUS; SUBCUTANEOUS at 08:20

## 2019-03-01 RX ADMIN — EPHEDRINE SULFATE 10 MG: 50 INJECTION, SOLUTION INTRAVENOUS at 07:25

## 2019-03-01 NOTE — OP NOTES
[place]  OPERATIVE REPORT    Name:  Marjorie Hernandez  MR#:  [id number]  :  1965  ACCOUNT #:  [de-identified]  DATE OF SERVICE:  2019    PREOPERATIVE DIAGNOSES:  1. Medial meniscus tear, left knee. 2.  Chondromalacia, left knee. 3.  Patellofemoral chondromalacia, left knee. 4.  Right knee pain. 5.  Right shoulder pain. POSTOPERATIVE DIAGNOSES:  1. Medial meniscus tear, left knee. 2.  Lateral meniscus tear, left knee. 3.  Osteoarthritis, left knee. 4.  Right knee pain. 5.  Right shoulder pain. PROCEDURE PERFORMED:  1. Right shoulder injection. 2.  Right knee injection. 3.  Arthroscopy of the left knee, partial medial and lateral meniscectomies. SURGEON:  Flaquita Melgar MD    PATHOLOGY:  1. Complex medial meniscal tear. 2.  Degenerative lateral meniscal tear. 3.  Osteoarthritis with grade 2-4 changes of the trochlea, grade 2-3 changes of the medial femoral condyle, grade 1-2 changes of the patella, grade 1 changes of the lateral femoral condyle, lateral tibial plateau, and medial tibial plateau. CPT CODES:  84971 for the left knee, 27924 for the right shoulder injection, 96149 for the right knee injection. ICD-10 CODES:  Q39.758, S83.282, M17.12, M25.511, M25.561. ANESTHESIA:  General.    COMPLICATIONS:  None. ESTIMATED BLOOD LOSS:  10 mL. INDICATIONS:  The patient is a 73-year-old gentleman who has developed recalcitrant left knee pain but also right shoulder and right knee pain. Preoperative physical exam, radiographs, and an MR demonstrated a medial meniscus tear of the left knee as well as chondromalacia, left knee. The patient has exhausted nonoperative modalities and electively admitted for operative intervention. PROCEDURE:   Following identification of the patient, the patient was taken to the operative suite.   Following administration of general anesthesia, administration of 3 gm of IV Ancef, the patient was then very carefully positioned on the operating room table in the supine fashion. Right knee was examined under anesthesia and noted to be stable through full range of motion. Right knee was then prepped and draped in sterile fashion and injected with 10 mL of Naropin and 10 mg of dexamethasone. A sterile dressing was applied. The right shoulder was then examined under anesthesia and noted to be stable through full range of motion. The right shoulder was then prepped and draped in sterile fashion and injected with 10 mL of Naropin and 10 mg of dexamethasone. A sterile dressing was applied. The left knee was examined under anesthesia and noted to be stable through full range of motion. The left knee was then prepped and draped in sterile fashion. The left knee was then injected with 20 mL of 1% Xylocaine with epinephrine. Scope was then introduced into the knee. Diagnostic arthroscopy was then commenced. Suprapatellar pouch, medial and lateral gutters were visualized. There was no loose body, foreign body or pathologic plica. Undersurface of the patella and trochlear groove were visualized. There were grade 1-2 changes of the patella, which were left alone. There were grade 2-4 changes of the trochlea diffusely, which were likely shaved, but a formal chondroplasty was not performed. The scope was then advanced into the medial compartment. His leg was flexed to 30 degrees. The medial meniscus was visualized in its entirety as well as through the notch. There was a complex tear of the posterior horn of the medial meniscus. With the use of upgoing flat biter and side biter, a partial medial meniscectomy was then performed. This was contoured with the meniscal shaver. Meniscus was probed and noted to be stable. Articular surfaces were notable for grade 2-3 changes of the medial femoral condyle, grade 1 changes of the medial tibial plateau, which were left alone. Scope was advanced to the notch.   ACL and PCL were intact. Scope was advanced to the lateral compartment. His leg was put in the figure-of-four position. Lateral meniscus was visualized in its entirety, both above and below the popliteus hiatus. There was some fraying of the entire lateral meniscus. A partial lateral meniscectomy was then performed with the use of an upbiter, flat biter, and a shaver. Meniscus was then contoured and probed and stable. There were grade 1 changes of the lateral femoral condyle and lateral tibial plateau. At this point, with the procedure complete, the knee was then copiously irrigated. Arthroscopic equipment was removed from the knee. The portals were reapproximated using 3-0 nylon horizontal mattress sutures. The knee was injected with 10 mL of Naropin and 10 mg of dexamethasone. A sterile dressing was applied. The patient was then transferred to the recovery room in stable condition. No tourniquet was utilized.         Mary Turner MD      [id number]  D:  03/01/2019 8:11  T:  03/01/2019 10:10  JOB #:  [id number]  CC:  Magdy Murrell MD

## 2019-03-01 NOTE — ANESTHESIA POSTPROCEDURE EVALUATION
Procedure(s): ARTHROSCOPY LEFT KNEE PARTIAL MEDIAL AND LATERAL MENISECTOMIES  
RIGHT KNEE INJECTION/ RIGHT SHOULDER INJECTION. Anesthesia Post Evaluation Multimodal analgesia: multimodal analgesia not used between 6 hours prior to anesthesia start to PACU discharge Patient location during evaluation: bedside Patient participation: complete - patient participated Level of consciousness: awake Pain management: adequate Airway patency: patent Anesthetic complications: no 
Cardiovascular status: acceptable and stable Respiratory status: acceptable and room air Hydration status: acceptable Post anesthesia nausea and vomiting:  none Visit Vitals /80 Pulse 62 Temp 36.1 °C (97 °F) Resp 16 Ht 5' 11\" (1.803 m) SpO2 93% BMI 39.05 kg/m²

## 2019-03-01 NOTE — ANESTHESIA PREPROCEDURE EVALUATION
Anesthetic History No history of anesthetic complications Review of Systems / Medical History Patient summary reviewed and pertinent labs reviewed Pulmonary Sleep apnea: CPAP Neuro/Psych Cardiovascular Hypertension: well controlled Pertinent negatives: No CAD Exercise tolerance: >4 METS 
  
GI/Hepatic/Renal 
  
GERD: well controlled Liver disease (fatty) Endo/Other Diabetes: well controlled, type 2 Morbid obesity Other Findings Comments: LANDON 
thrombocytopenia Physical Exam 
 
Airway Mallampati: III 
TM Distance: 4 - 6 cm Neck ROM: normal range of motion Mouth opening: Normal 
 
 Cardiovascular Regular rate and rhythm,  S1 and S2 normal,  no murmur, click, rub, or gallop Rhythm: regular Rate: normal 
 
 
 
 Dental 
No notable dental hx Pulmonary Breath sounds clear to auscultation Abdominal 
GI exam deferred Other Findings Anesthetic Plan ASA: 2 Anesthesia type: general 
 
 
 
 
Induction: Intravenous Anesthetic plan and risks discussed with: Patient

## 2019-03-01 NOTE — DISCHARGE INSTRUCTIONS
INSTRUCTIONS FOLLOWING ARTHROSCOPY SURGERY  Dr. Adenike Jeter [phone]    ACTIVITY   As tolerated and as directed by your doctor   Elevate surgery site first 48 hours.  Use arm sling or crutches per your doctors instructions.  Bathe or shower as directed by your doctor. DIET   Clear liquids until no nausea or vomiting; then light diet for the first day   Advance to regular diet on second day, unless your doctor orders otherwise.  If nausea and vomiting continues, call your doctor. PAIN   Take pain medication as directed by your doctor.  Call your doctor if pain is NOT relieved by medication.  DO NOT take aspirin or blood thinners until directed by your doctor. DRESSING CARE: Follow all dressing care instructions provided by Dr. Horta Lapping will be made by nursing staff.  If you have any problems or concerns, call your doctor as needed. CALL YOUR DOCTOR IF   Excessive bleeding that does not stop after holding mild pressure over the area   Temperature of 101°F or above   Redness, excessive swelling or bruising, and/or green or yellow, smelly discharge from incision    AFTER ANESTHESIA   For the next 24 hours: DO NOT Drive, Drink alcoholic beverages, or Make important decisions.  Be aware of dizziness following anesthesia and while taking pain medication. MEDICATIONS:  Continue home medications as previously prescribed with the following changes: {None:14013::\"none\"}. Cryo Cuff or Iceman 24-48 hours continuously    APPOINTMENT DATE/TIME: On *** at *** {Time; am/pm:40563} at the *** location. INSTRUCTIONS FOLLOWING ARTHROSCOPY SURGERY  Dr. Adenike Jeter [phone]    ACTIVITY   As tolerated and as directed by your doctor   Elevate surgery site first 48 hours.  Use arm sling or crutches per your doctors instructions.  Bathe or shower as directed by your doctor.     DIET   Clear liquids until no nausea or vomiting; then light diet for the first day   Advance to regular diet on second day, unless your doctor orders otherwise.  If nausea and vomiting continues, call your doctor. PAIN   Take pain medication as directed by your doctor.  Call your doctor if pain is NOT relieved by medication.  DO NOT take aspirin or blood thinners until directed by your doctor. DRESSING CARE: Follow all dressing care instructions provided by Dr. Watts Kinds will be made by nursing staff.  If you have any problems or concerns, call your doctor as needed. CALL YOUR DOCTOR IF   Excessive bleeding that does not stop after holding mild pressure over the area   Temperature of 101°F or above   Redness, excessive swelling or bruising, and/or green or yellow, smelly discharge from incision    AFTER ANESTHESIA   For the next 24 hours: DO NOT Drive, Drink alcoholic beverages, or Make important decisions.  Be aware of dizziness following anesthesia and while taking pain medication.

## 2019-03-01 NOTE — H&P
Date of Surgery Update: 
Lyndsay Chan. was seen and examined. History and physical has been reviewed. The patient has been examined.  There have been no significant clinical changes since the completion of the originally dated History and Physical. 
 
Signed By: Amina Nichole MD   
 March 1, 2019 6:26 AM

## 2019-03-20 ENCOUNTER — HOSPITAL ENCOUNTER (OUTPATIENT)
Dept: PHYSICAL THERAPY | Age: 54
Discharge: HOME | End: 2019-03-20
Payer: COMMERCIAL

## 2019-03-20 PROCEDURE — 97110 THERAPEUTIC EXERCISES: CPT

## 2019-03-20 PROCEDURE — 97161 PT EVAL LOW COMPLEX 20 MIN: CPT

## 2019-03-20 NOTE — PROGRESS NOTES
Aicha Edwards. : 1965  Primary: Haresh Guzman [facility] [place]  Secondary:  Clark Be @ [address] Alexey SYLVIE Barrett.  [phone]   [phone]      OUTPATIENT PHYSICAL THERAPY: Daily Treatment Note  3/20/2019     Effective Dates: 3/20/2019 TO 2019 (90 days). Frequency/Duration: 1 time a week for 90 Days  GOALS: (Goals have been discussed and agreed upon with patient.)  Short-Term Functional Goals: Time Frame: 2 weeks  1. Patient will be independent with HEP. 2. Patient will improve knee extension to 0 ° to normalize joint mechanics through stance phase of gait. Discharge Goals: Time Frame: 4 weeks  1. Patient will have no pain with daily activity. 2. Patient will improve lower extremity strength to enable him to get up from the ground without pain. 3. Patient will improve knee extension strength to 5/5 to enable him to descend stairs without pain.    _________________________________________________________________________  Pre-treatment Symptoms/Complaints:  See initial eval  Pain: Initial: 6/10 Post Session:  5/10   Medications Last Reviewed:  3/21/2019    Updated Objective Findings:  See evaluation note from today     TREATMENT:   THERAPEUTIC EXERCISE: (see below for minutes):  Exercises per grid below to improve mobility, strength, balance and coordination. Required minimal visual, verbal and manual cues to promote proper body alignment, promote proper body posture and promote proper body mechanics. Progressed resistance, range, repetitions and complexity of movement as indicated. MANUAL THERAPY: (see below for minutes): Joint mobilization and Soft tissue mobilization was utilized and necessary because of the patient's restricted joint motion, painful spasm, loss of articular motion and restricted motion of soft tissue.    MODALITIES: (see below for minutes):      to decrease pain      Date: 3/20/19       Modalities: Therapeutic Exercise: 25 min       Quad set [id number]       SLR 3x10       bridge 3x10       Hip abduction 3x10                       Proprioceptive Activities:                                Manual Therapy:                        Therapeutic Activities:                                  HEP: see 3/20/19 flow sheet for specifics    [facility]  Treatment/Session Summary:    · Response to Treatment:  Patient is independent with HEP. · Communication/Consultation:  None today  · Equipment provided today:  None today  · Recommendations/Intent for next treatment session: Next visit will focus on progress of joint mobility and improving strength.     Total Treatment Billable Duration:  45 min  PT Patient Time In/Time Out  Time In: 1445  Time Out: 1400 [facility]

## 2019-03-20 NOTE — THERAPY EVALUATION
Amina Otero. : 1965 [address] Cruz [address] [place] [address].  [phone]   [phone]        OUTPATIENT PHYSICAL THERAPY:Initial Assessment 3/20/2019         ICD-10: Treatment Diagnosis: Pain in left knee (M25.562); Difficulty in walking, not elsewhere classified (R26.2)  Precautions/Allergies:   Patient has no known allergies. Fall Risk Score:     Ambulatory/Rehab Services H2 Model Falls Risk Assessment    Risk Factors:       (1)  Gender [Male] Ability to Rise from Chair:       (0)  Ability to rise in a single movement    Falls Prevention Plan:       No modifications necessary   Total: (5 or greater = High Risk): 1     [facility] [employer]. All Rights Reserved. [facility] Patent #[id number]. Federal Law prohibits the replication, distribution or use without written permission from [employer]     MD Orders: evaluate and treat, HEP, ROM MEDICAL/REFERRING DIAGNOSIS:  MMT (medial meniscus tear) [A31.706Y]   DATE OF ONSET: date of surgery: 3/1/19; s/p left knee meniscectomy and chondroplasty. REFERRING PHYSICIAN: Aidee Michelle MD  RETURN PHYSICIAN APPOINTMENT: 19     INITIAL ASSESSMENT:  Mr. Kenton Sam presents to therapy with left knee pain following above surgical procedure. He has restricted mobility through the joint, specifically into knee extension. He has weakness present throughout the extremity, specifically through the quadriceps and lateral hip musculature leading to increased compressive loading at the patellofemoral and tibiofemoral joint. This causes difficulty with all walking and weight bearing activities. Skilled therapy is required to return to prior level of function. PROBLEM LIST (Impacting functional limitations):  1. Decreased Strength  2. Decreased ADL/Functional Activities  3. Decreased Balance  4. Increased Pain  5. Decreased Activity Tolerance  6.  Decreased Flexibility/Joint Mobility INTERVENTIONS PLANNED:  1. Balance Exercise  2. Family Education  3. Gait Training  4. Home Exercise Program (HEP)  5. Manual Therapy  6. Neuromuscular Re-education/Strengthening  7. Range of Motion (ROM)  8. Therapeutic Activites  9. Therapeutic Exercise/Strengthening  10. modalities    TREATMENT PLAN:  Effective Dates: 3/20/2019 TO 6/18/2019 (90 days). Frequency/Duration: 1 time a week for 90 Days  GOALS: (Goals have been discussed and agreed upon with patient.)  Short-Term Functional Goals: Time Frame: 2 weeks  1. Patient will be independent with HEP. 2. Patient will improve knee extension to 0 ° to normalize joint mechanics through stance phase of gait. Discharge Goals: Time Frame: 4 weeks  1. Patient will have no pain with daily activity. 2. Patient will improve lower extremity strength to enable him to get up from the ground without pain. 3. Patient will improve knee extension strength to 5/5 to enable him to descend stairs without pain. Rehabilitation Potential For Stated Goals: Excellent  Regarding Nikole Haskins Jr.'s therapy, I certify that the treatment plan above will be carried out by a therapist or under their direction. Thank you for this referral,  Yvonne Hernandes DPT       Referring Physician Signature: Amarilis Anglin MD              Date                      HISTORY:   History of Present Injury/Illness (Reason for Referral):  Patient presents to therapy following above surgical procedure. He denies any specific mechanism of injury but notes progressively worsening knee pain. He presents today with primary complaint of pain and weakness through the knee with general aching described. 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  He has had one episode of giving way at the knee, but this does not happen often. He also notes only minimal swelling at the knee. Past Medical History/Comorbidities:   Mr. Keyur Uribe  has a past medical history of Cancer ([facility] Diabetes ([facility] Esophageal varices ([facility] GERD (gastroesophageal reflux disease), Hoarseness, Hypertension, Laryngopharyngeal reflux, Liver disease, Mild depression ([facility] (9/18/2018), Morbid obesity ([facility] LANDON (nonalcoholic steatohepatitis), Obstructive sleep apnea on CPAP (7/18/2016), and Thrombocytopenia ([facility] He also has no past medical history of Difficult intubation, Malignant hyperthermia due to anesthesia, Nausea & vomiting, Other ill-defined conditions(799.89), Pseudocholinesterase deficiency, Unspecified adverse effect of anesthesia, or Unspecified sleep apnea. Mr. Keyur Uribe  has a past surgical history that includes hx other surgical; hx heent; hx heent; hx orthopaedic (Right, 2008); hx orthopaedic (Left, 2017); colonoscopy (N/A, 8/30/2018); and hx meniscus repair (Left, 03/01/2019). Social History/Living Environment:     Patient lives at home with his family. Prior Level of Function/Work/Activity:  See above. Work duties involve driving heavy machinery. Dominant Side:         RIGHT  Current Medications:    Current Outpatient Medications:     peg 400-propylene glycol (SYSTANE, PROPYLENE GLYCOL,) 0.4-0.3 % drop, 1 Drop daily as needed. , Disp: , Rfl:     ketorolac (TORADOL) 10 mg tablet, TAKE 1 TABLET EVERY 6 HOURS FOR 5 DAYS POSTOP, Disp: , Rfl: 0    amoxicillin (AMOXIL) 875 mg tablet, Take 1 Tab by mouth two (2) times a day., Disp: 14 Tab, Rfl: 0    predniSONE (DELTASONE) 10 mg tablet, Take 10 mg by mouth daily (with breakfast). , Disp: 10 Tab, Rfl: 0    lancets (FREESTYLE LANCETS) 28 gauge misc, Testing daily as directed, Disp: 100 Lancet, Rfl: 12    FREESTYLE TEST strip, Testing daily as directed, Disp: 50 Strip, Rfl: 12    cyanocobalamin 1,000 mcg tablet, Take 1,000 mcg by mouth daily. , Disp: , Rfl:     lisinopril (PRINIVIL, ZESTRIL) 10 mg tablet, TAKE 1 TAB BY MOUTH DAILY. , Disp: 30 Tab, Rfl: 11    lactulose (CHRONULAC) 10 gram/15 mL solution, Take  by mouth as needed. , Disp: , Rfl:     nadolol (CORGARD) 40 mg tablet, Take 40 mg by mouth nightly., Disp: , Rfl:     venlafaxine-SR (EFFEXOR-XR) 75 mg capsule, Take 1 Cap by mouth daily. , Disp: 90 Cap, Rfl: 4    metFORMIN ER (GLUCOPHAGE XR) 500 mg tablet, TAKE 1 TABLET BY MOUTH DAILY (WITH DINNER). (Patient taking differently: TAKE 1 TABLET BY MOUTH DAILY), Disp: 60 Tab, Rfl: 11    atorvastatin (LIPITOR) 10 mg tablet, TAKE 1 TABLET BY MOUTH DAILY. , Disp: 30 Tab, Rfl: 12    esomeprazole (NEXIUM) 20 mg capsule, Take 20 mg by mouth daily. , Disp: , Rfl:     fluticasone (FLONASE) 50 mcg/actuation nasal spray, 2 Sprays by Both Nostrils route daily. (Patient taking differently: 2 Sprays by Both Nostrils route daily as needed.), Disp: 1 Bottle, Rfl: 12    potassium 99 mg tablet, Take 99 mg by mouth daily. , Disp: , Rfl:    Date Last Reviewed:  3/20/2019   # of Personal Factors/Comorbidities that affect the Plan of Care: 0: LOW COMPLEXITY   EXAMINATION:   Observation/Orthostatic Postural Assessment:          Incision sites are dry and healed. He ambulates with increased knee flexion during stance phase with genu varum posture. Palpation:          Tender along medial joint line and medial patella. ROM:     Left knee: 5 to 130 ° with stiff end feel into extension  Right knee: 0 to 135 °   SLR: 50 ° L; 60 ° R  Hip rotation is WNL      Strength:     Quad set is fair  Knee extension: 4+/5 L; 5/5 R  SLR: 4/5 L; 5/5 R  Hip abduction: 4/5 L      Special Tests:          Not applicable  Neurological Screen:        Grossly intact  Functional Mobility:         independent  Balance: WNL   Body Structures Involved:  1. Nerves  2. Bones  3. Joints  4. Muscles  5. Ligaments Body Functions Affected:  1. Sensory/Pain  2. Neuromusculoskeletal  3.  Movement Related Activities and Participation Affected:  1. General Tasks and Demands  2. Mobility  3. Self Care  4. Domestic Life  5. Interpersonal Interactions and Relationships  6. Community, Social and [place]   # of elements that affect the Plan of Care: 1-2: LOW COMPLEXITY   CLINICAL PRESENTATION:   Presentation: Stable and uncomplicated: LOW COMPLEXITY   CLINICAL DECISION MAKING:   Tool Used: Lower Extremity Functional Scale (LEFS)  Score:  Initial: 35/80 Most Recent: X/80 (Date: -- )   Interpretation of Score: 20 questions each scored on a 5 point scale with 0 representing \"extreme difficulty or unable to perform\" and 4 representing \"no difficulty\". The lower the score, the greater the functional disability. 80/80 represents no disability. Minimal detectable change is 9 points. Medical Necessity:   · Patient is expected to demonstrate progress in strength, range of motion, balance and coordination to increase independence with community mobility and return to prior level of function. Reason for Services/Other Comments:  · Patient has demonstrated an improvement in functional level by independent performance of HEP.    Use of outcome tool(s) and clinical judgement create a POC that gives a: Clear prediction of patient's progress: LOW COMPLEXITY     Total Treatment Duration:   245 to 300 Lebron Roberts DPT

## 2019-03-22 ENCOUNTER — APPOINTMENT (OUTPATIENT)
Dept: PHYSICAL THERAPY | Age: 54
End: 2019-03-22
Payer: COMMERCIAL

## 2019-03-26 ENCOUNTER — APPOINTMENT (OUTPATIENT)
Dept: PHYSICAL THERAPY | Age: 54
End: 2019-03-26
Payer: COMMERCIAL

## 2019-03-28 ENCOUNTER — APPOINTMENT (OUTPATIENT)
Dept: PHYSICAL THERAPY | Age: 54
End: 2019-03-28
Payer: COMMERCIAL

## 2019-03-28 ENCOUNTER — HOSPITAL ENCOUNTER (OUTPATIENT)
Dept: PHYSICAL THERAPY | Age: 54
Discharge: HOME | End: 2019-03-28
Payer: COMMERCIAL

## 2019-04-02 ENCOUNTER — APPOINTMENT (OUTPATIENT)
Dept: PHYSICAL THERAPY | Age: 54
End: 2019-04-02
Payer: COMMERCIAL

## 2019-04-04 ENCOUNTER — APPOINTMENT (OUTPATIENT)
Dept: PHYSICAL THERAPY | Age: 54
End: 2019-04-04
Payer: COMMERCIAL

## 2019-04-04 ENCOUNTER — HOSPITAL ENCOUNTER (OUTPATIENT)
Dept: PHYSICAL THERAPY | Age: 54
Discharge: HOME | End: 2019-04-04
Payer: COMMERCIAL

## 2019-04-11 ENCOUNTER — HOSPITAL ENCOUNTER (OUTPATIENT)
Dept: PHYSICAL THERAPY | Age: 54
Discharge: HOME | End: 2019-04-11
Payer: COMMERCIAL

## 2019-04-11 PROCEDURE — 97110 THERAPEUTIC EXERCISES: CPT

## 2019-04-11 NOTE — PROGRESS NOTES
Stephanie Levy. : 1965  Primary: [facility]*  Secondary:  [facility] @ [address]  [address] [place] [address]  [phone]   [phone]      OUTPATIENT PHYSICAL THERAPY: Daily Treatment Note  2019     Effective Dates: 3/20/2019 TO 2019 (90 days). Frequency/Duration: 1 time a week for 90 Days  GOALS: (Goals have been discussed and agreed upon with patient.)  Short-Term Functional Goals: Time Frame: 2 weeks  1. Patient will be independent with HEP. 2. Patient will improve knee extension to 0 ° to normalize joint mechanics through stance phase of gait. Discharge Goals: Time Frame: 4 weeks  1. Patient will have no pain with daily activity. 2. Patient will improve lower extremity strength to enable him to get up from the ground without pain. 3. Patient will improve knee extension strength to 5/5 to enable him to descend stairs without pain.    _________________________________________________________________________  Pre-treatment Symptoms/Complaints:  Pt states \"I just have pain on the inside of the knee but it's better. \"  Pain: Initial: 2-3/10 L knee Post Session:  1/10   Medications Last Reviewed:  2019    Updated Objective Findings:  No changes today. TREATMENT:   THERAPEUTIC EXERCISE: (see below for minutes):  Exercises per grid below to improve mobility, strength, balance and coordination. Required minimal visual, verbal and manual cues to promote proper body alignment, promote proper body posture and promote proper body mechanics. Progressed resistance, range, repetitions and complexity of movement as indicated. MANUAL THERAPY: (see below for minutes): Joint mobilization and Soft tissue mobilization was utilized and necessary because of the patient's restricted joint motion, painful spasm, loss of articular motion and restricted motion of soft tissue.    MODALITIES: (see below for minutes):      to decrease pain      Date: 3/20/19 4/11/19 (visit 2)      Modalities:                                Therapeutic Exercise: 25 min 45 min      Hamstring stretch  30 sec hold x 2       Quad set [id number] x30       SLR 3x10 3x10      bridge 3x10 3x10      Hip abduction 3x10 3x10      bike  5 min      Hip extension  Prone 3x10      Proprioceptive Activities:                                Manual Therapy:                        Therapeutic Activities:                                  HEP: see 3/20/19 flow sheet for specifics    [employer] Portal  Treatment/Session Summary: Pt states that he hasn't been consistent with the HEP but it was reviewed with him. Continue POC. Pt's active ROM was 5-130 degrees but he can reach 0 degrees of extension with a quad set. · Response to Treatment:  Patient is independent with HEP. · Communication/Consultation:  None today  · Equipment provided today:  None today  · Recommendations/Intent for next treatment session: Next visit will focus on progress of joint mobility and improving strength.     Total Treatment Billable Duration:  45 min  PT Patient Time In/Time Out  Time In: 0800  Time Out: 0845  Louetta Lefort, GISEL

## 2019-04-25 ENCOUNTER — HOSPITAL ENCOUNTER (OUTPATIENT)
Dept: PHYSICAL THERAPY | Age: 54
Discharge: HOME | End: 2019-04-25
Payer: COMMERCIAL

## 2019-05-02 ENCOUNTER — HOSPITAL ENCOUNTER (OUTPATIENT)
Dept: PHYSICAL THERAPY | Age: 54
Discharge: HOME | End: 2019-05-02

## 2019-06-05 NOTE — PROGRESS NOTES
Naomie Boast.   (:1965) [address] [address].  [phone]   [phone]           Discontinuation summary    REFERRING PHYSICIAN: Posta, Edra Aase., MD  Return Physician Appointment: to be determined  MEDICAL/REFERRING DIAGNOSIS:  · MMT (medial meniscus tear) [W62.283Q]  ATTENDANCE: Naomie Boast. has attended 2 out of 9 visits, with 3 cancellation(s) and 3 no shows. ASSESSMENT:  DATE: 2019    PROGRESS: Naomie Boast. cancelled several times and did not return for his last appointment. RECOMMENDATIONS: Discharge.     Thank you for this referral,  Rebecca Dietz PTA     Referring Physician Signature: Renuka Pimentel MD          Date

## 2019-10-17 ENCOUNTER — HOSPITAL ENCOUNTER (OUTPATIENT)
Dept: LAB | Age: 54
Discharge: HOME | End: 2019-10-17
Attending: PHYSICIAN ASSISTANT
Payer: COMMERCIAL

## 2019-10-17 DIAGNOSIS — R51.9 ACUTE INTRACTABLE HEADACHE, UNSPECIFIED HEADACHE TYPE: ICD-10-CM

## 2019-10-17 LAB
BASOPHILS # BLD: 0 K/UL (ref 0–0.2)
BASOPHILS NFR BLD: 0 % (ref 0–2)
DIFFERENTIAL METHOD BLD: ABNORMAL
EOSINOPHIL # BLD: 0.2 K/UL (ref 0–0.8)
EOSINOPHIL NFR BLD: 4 % (ref 0.5–7.8)
ERYTHROCYTE [DISTWIDTH] IN BLOOD BY AUTOMATED COUNT: 14.8 % (ref 11.9–14.6)
HCT VFR BLD AUTO: 39.3 % (ref 41.1–50.3)
HGB BLD-MCNC: 13.6 G/DL (ref 13.6–17.2)
IMM GRANULOCYTES # BLD AUTO: 0 K/UL (ref 0–0.5)
IMM GRANULOCYTES NFR BLD AUTO: 0 % (ref 0–5)
LYMPHOCYTES # BLD: 1.2 K/UL (ref 0.5–4.6)
LYMPHOCYTES NFR BLD: 25 % (ref 13–44)
MCH RBC QN AUTO: 31.3 PG (ref 26.1–32.9)
MCHC RBC AUTO-ENTMCNC: 34.6 G/DL (ref 31.4–35)
MCV RBC AUTO: 90.3 FL (ref 79.6–97.8)
MONOCYTES # BLD: 0.4 K/UL (ref 0.1–1.3)
MONOCYTES NFR BLD: 9 % (ref 4–12)
NEUTS SEG # BLD: 3.1 K/UL (ref 1.7–8.2)
NEUTS SEG NFR BLD: 62 % (ref 43–78)
NRBC # BLD: 0 K/UL (ref 0–0.2)
PLATELET # BLD AUTO: 79 K/UL (ref 150–450)
PMV BLD AUTO: 11.7 FL (ref 9.4–12.3)
RBC # BLD AUTO: 4.35 M/UL (ref 4.23–5.6)
WBC # BLD AUTO: 5 K/UL (ref 4.3–11.1)

## 2019-10-17 PROCEDURE — 85025 COMPLETE CBC W/AUTO DIFF WBC: CPT

## 2019-10-17 PROCEDURE — 36415 COLL VENOUS BLD VENIPUNCTURE: CPT

## 2020-07-13 PROBLEM — Z20.822 EXPOSURE TO COVID-19 VIRUS: Status: ACTIVE | Noted: 2020-07-13

## 2020-10-20 ENCOUNTER — ANESTHESIA EVENT (OUTPATIENT)
Dept: ENDOSCOPY | Age: 55
End: 2020-10-20
Payer: COMMERCIAL

## 2020-10-20 RX ORDER — SODIUM CHLORIDE, SODIUM LACTATE, POTASSIUM CHLORIDE, CALCIUM CHLORIDE 600; 310; 30; 20 MG/100ML; MG/100ML; MG/100ML; MG/100ML
100 INJECTION, SOLUTION INTRAVENOUS CONTINUOUS
Status: CANCELLED | OUTPATIENT
Start: 2020-10-20

## 2020-10-20 RX ORDER — SODIUM CHLORIDE 0.9 % (FLUSH) 0.9 %
5-40 SYRINGE (ML) INJECTION AS NEEDED
Status: CANCELLED | OUTPATIENT
Start: 2020-10-20

## 2020-10-20 RX ORDER — SODIUM CHLORIDE 0.9 % (FLUSH) 0.9 %
5-40 SYRINGE (ML) INJECTION EVERY 8 HOURS
Status: CANCELLED | OUTPATIENT
Start: 2020-10-20

## 2020-10-20 NOTE — ANESTHESIA PREPROCEDURE EVALUATION
Anesthetic History   No history of anesthetic complications            Review of Systems / Medical History  Patient summary reviewed and pertinent labs reviewed    Pulmonary        Sleep apnea: CPAP           Neuro/Psych         Psychiatric history     Cardiovascular    Hypertension: well controlled              Exercise tolerance: >4 METS     GI/Hepatic/Renal     GERD: well controlled      Liver disease (fatty)     Endo/Other    Diabetes: well controlled, type 2    Morbid obesity     Other Findings   Comments: LANDON  thrombocytopenia         Physical Exam    Airway  Mallampati: III  TM Distance: 4 - 6 cm  Neck ROM: normal range of motion   Mouth opening: Normal     Cardiovascular  Regular rate and rhythm,  S1 and S2 normal,  no murmur, click, rub, or gallop  Rhythm: regular  Rate: normal         Dental  No notable dental hx       Pulmonary  Breath sounds clear to auscultation               Abdominal  GI exam deferred       Other Findings            Anesthetic Plan    ASA: 3  Anesthesia type: total IV anesthesia          Induction: Intravenous  Anesthetic plan and risks discussed with: Patient

## 2020-10-21 ENCOUNTER — HOSPITAL ENCOUNTER (OUTPATIENT)
Age: 55
Setting detail: OUTPATIENT SURGERY
Discharge: HOME | End: 2020-10-21
Attending: INTERNAL MEDICINE | Admitting: INTERNAL MEDICINE
Payer: COMMERCIAL

## 2020-10-21 ENCOUNTER — ANESTHESIA (OUTPATIENT)
Dept: ENDOSCOPY | Age: 55
End: 2020-10-21
Payer: COMMERCIAL

## 2020-10-21 VITALS
WEIGHT: 290 LBS | OXYGEN SATURATION: 98 % | TEMPERATURE: 96.8 F | RESPIRATION RATE: 17 BRPM | DIASTOLIC BLOOD PRESSURE: 58 MMHG | HEART RATE: 67 BPM | HEIGHT: 71 IN | SYSTOLIC BLOOD PRESSURE: 103 MMHG | BODY MASS INDEX: 40.6 KG/M2

## 2020-10-21 LAB — GLUCOSE BLD STRIP.AUTO-MCNC: 123 MG/DL (ref 65–100)

## 2020-10-21 PROCEDURE — 74011000250 HC RX REV CODE- 250: Performed by: NURSE ANESTHETIST, CERTIFIED REGISTERED

## 2020-10-21 PROCEDURE — 76060000031 HC ANESTHESIA FIRST 0.5 HR: Performed by: INTERNAL MEDICINE

## 2020-10-21 PROCEDURE — 82962 GLUCOSE BLOOD TEST: CPT

## 2020-10-21 PROCEDURE — 76040000025: Performed by: INTERNAL MEDICINE

## 2020-10-21 PROCEDURE — 74011250636 HC RX REV CODE- 250/636: Performed by: NURSE ANESTHETIST, CERTIFIED REGISTERED

## 2020-10-21 PROCEDURE — 2709999900 HC NON-CHARGEABLE SUPPLY: Performed by: INTERNAL MEDICINE

## 2020-10-21 PROCEDURE — 74011250636 HC RX REV CODE- 250/636: Performed by: ANESTHESIOLOGY

## 2020-10-21 RX ORDER — PROPOFOL 10 MG/ML
INJECTION, EMULSION INTRAVENOUS
Status: DISCONTINUED | OUTPATIENT
Start: 2020-10-21 | End: 2020-10-21 | Stop reason: HOSPADM

## 2020-10-21 RX ORDER — LIDOCAINE HYDROCHLORIDE 20 MG/ML
INJECTION, SOLUTION EPIDURAL; INFILTRATION; INTRACAUDAL; PERINEURAL AS NEEDED
Status: DISCONTINUED | OUTPATIENT
Start: 2020-10-21 | End: 2020-10-21 | Stop reason: HOSPADM

## 2020-10-21 RX ORDER — PROPOFOL 10 MG/ML
INJECTION, EMULSION INTRAVENOUS AS NEEDED
Status: DISCONTINUED | OUTPATIENT
Start: 2020-10-21 | End: 2020-10-21 | Stop reason: HOSPADM

## 2020-10-21 RX ORDER — SODIUM CHLORIDE, SODIUM LACTATE, POTASSIUM CHLORIDE, CALCIUM CHLORIDE 600; 310; 30; 20 MG/100ML; MG/100ML; MG/100ML; MG/100ML
100 INJECTION, SOLUTION INTRAVENOUS CONTINUOUS
Status: DISCONTINUED | OUTPATIENT
Start: 2020-10-21 | End: 2020-10-21 | Stop reason: HOSPADM

## 2020-10-21 RX ADMIN — LIDOCAINE HYDROCHLORIDE 20 MG: 20 INJECTION, SOLUTION EPIDURAL; INFILTRATION; INTRACAUDAL; PERINEURAL at 12:10

## 2020-10-21 RX ADMIN — SODIUM CHLORIDE, SODIUM LACTATE, POTASSIUM CHLORIDE, AND CALCIUM CHLORIDE 100 ML/HR: 600; 310; 30; 20 INJECTION, SOLUTION INTRAVENOUS at 11:13

## 2020-10-21 RX ADMIN — PROPOFOL 20 MG: 10 INJECTION, EMULSION INTRAVENOUS at 12:10

## 2020-10-21 RX ADMIN — PROPOFOL 160 MCG/KG/MIN: 10 INJECTION, EMULSION INTRAVENOUS at 12:10

## 2020-10-21 NOTE — H&P
History and Physical for Outpatient Procedure             Date: 10/21/2020     History of Present Illness:  Patient with cirrhosis presents for EGD for evaluation of varices and possible variceal banding. Past Medical History:   Diagnosis Date    Cancer [facility])     skin CA    Diabetes ([address]     type 2; dx 2017- avg fasting 130 -last A1C=6.2 on 4/16/18     Esophageal varices ([address]     controlled with med    GERD (gastroesophageal reflux disease)     controlled with med    Hoarseness     Hypertension     managed with meds    Laryngopharyngeal reflux     Liver disease     \"fatty liver-\"elevated enzymes: monitored by GI and PCP    Mild depression ([address] 09/18/2018    managed with meds    Morbid obesity ([address]     BMI- 44    LANDON (nonalcoholic steatohepatitis)     monitored by GI    Obstructive sleep apnea on CPAP 7/18/2016    Thrombocytopenia ([address]     monitored by hematologist     Past Surgical History:   Procedure Laterality Date    COLONOSCOPY N/A 8/30/2018    COLONOSCOPY/ 40 performed by Cielo Carlos MD at [facility] ENDOSCOPY    HX HEENT      nasal surg    HX HEENT      Sinus Surg x2    HX MENISCUS REPAIR Left 03/01/2019    HX ORTHOPAEDIC Right 2008    wrist fx    HX ORTHOPAEDIC Left 2017    wrist fx    HX OTHER SURGICAL      Wrist surgery     In and  Family History   Problem Relation Age of Onset    Cancer Mother     Diabetes Father     Dementia Father     Hypertension Father      Social History     Tobacco Use    Smoking status: Never Smoker    Smokeless tobacco: Never Used   Substance Use Topics    Alcohol use: No        No Known Allergies  Current Facility-Administered Medications   Medication Dose Route Frequency    lactated Ringers infusion  100 mL/hr IntraVENous CONTINUOUS        Review of Systems:  A detailed 10 organ review of systems is obtained with pertinent positives as listed in the History of Present Illness. All others are negative.     Objective:     Physical Exam:  Visit Vitals  BP (!) 144/68   Pulse 68   Temp 98.2 °F (36.8 °C)   Resp 18   Ht 5' 11\" (1.803 m)   Wt 131.5 kg (290 lb)   SpO2 95%   BMI 40.45 kg/m²      General:  Alert and oriented. Heart: Regular rate and rhythm  Lungs:  Clear to auscultation bilaterally  Abdomen: Soft, nontender, nondistended    Impression/Plan:     Proceed with EGD with possible variceal banding. I have discussed with the patient the technique, benefits, alternatives, and risks of these procedures, including medication reaction, immediate or delayed bleeding, or perforation of the gastrointestinal tract.      Signed By: Merari Mesa MD     October 21, 2020

## 2020-10-21 NOTE — ANESTHESIA POSTPROCEDURE EVALUATION
Procedure(s):  ESOPHAGOGASTRODUODENOSCOPY (EGD)/ BMI 41.    total IV anesthesia    Anesthesia Post Evaluation      Multimodal analgesia: multimodal analgesia used between 6 hours prior to anesthesia start to PACU discharge  Patient location during evaluation: PACU  Patient participation: complete - patient participated  Level of consciousness: awake and alert  Pain management: adequate  Airway patency: patent  Anesthetic complications: no  Cardiovascular status: acceptable and hemodynamically stable  Respiratory status: acceptable  Hydration status: acceptable  Post anesthesia nausea and vomiting:  none  Final Post Anesthesia Temperature Assessment:  Normothermia (36.0-37.5 degrees C)      INITIAL Post-op Vital signs:   Vitals Value Taken Time   BP 91/54 10/21/2020 12:28 PM   Temp 36 °C (96.8 °F) 10/21/2020 12:21 PM   Pulse 65 10/21/2020 12:34 PM   Resp 18 10/21/2020 12:21 PM   SpO2 97 % 10/21/2020 12:34 PM   Vitals shown include unvalidated device data.

## 2020-10-21 NOTE — DISCHARGE INSTRUCTIONS
Gastrointestinal Esophagogastroduodenoscopy (EGD) - Upper Exam Discharge Instructions    1. Call Dr. Juanita Gautam at [phone] for any problems or questions. 2. Contact the doctor's office for follow up appointment as directed. 3. Medication may cause drowsiness for several hours, therefore:  · Do not drive or operate machinery for remainder of the day. · No alcohol today. · Do not make any important or legal decisions for 24 hours. · Do not sign any legal documents for 24 hours. 5. Ordinarily, you may resume regular diet and activity after exam unless otherwise              specified by your physician. 6. For mild soreness in your throat you may use Cepacol throat lozenges or warm               salt-water gargles as needed. Any additional instructions:  1. Continue nadolol 40 mg at night. 2. Repeat EGD in 12 months at [facility].   3. Return to office in 2-3 months. Instructions given to Gerri Ruby. and other family members.

## 2020-10-21 NOTE — OP NOTES
Gastroenterology Procedure Note           Procedure:  Esophagogastroduodenoscopy    Date of Procedure:  10/21/2020    Patient:  rEik Hernandez     1965    Indication:  Portal hypertension, evaluate for esophageal varices     Sedation:  MAC    Pre-Procedure Physical Exam:    Mental status:  alert and oriented  Airway:  normal oropharyngeal airway and neck mobility  CV:  regular rate and rhythm  Respiratory:  clear to auscultation    Procedure:  A History and Physical has been performed, and patient medication allergies have been reviewed. Risks of perforation, hemorrhage, adverse drug reaction, and aspiration were discussed. Informed consent was obtained for the procedure, including sedation. The patient was placed in the left lateral decubitus position. The heart rate, oxygen saturations, blood pressure, and response to care were monitored throughout the procedure. The gastroscope was passed through the mouth and advanced under direct vision to the second portion of the duodenum. A careful inspection was made as the gastroscope was withdrawn, including a retroflexed view of the proximal stomach. The patient tolerated the procedure well. Findings:     OROPHARYNX: Cords and pyriform recesses appear normal.   ESOPHAGUS: Grade 2 varices were found in the middle and distal thirds of the esophagus. No red annemarie signs or other stigmata of recent hemorrhage were seen. STOMACH: Portal hypertensive gastropathy was seen in the fundus and body. DUODENUM: The bulb and second portions are normal.    Specimen:  No    Estimated Blood Loss:  None    Implant:  None           Impression:    Grade 2 esophageal varices. Portal hypertensive gastropathy. Plan:  1. Continue nadolol 40 mg at night. 2. Repeat EGD in 12 months at [facility].   3. Return to office in 2-3 months.      Signed:  Cielo Carlos MD  10/21/2020  12:19 PM

## 2021-04-26 ENCOUNTER — HOSPITAL ENCOUNTER (OUTPATIENT)
Dept: GENERAL RADIOLOGY | Age: 56
Discharge: HOME | End: 2021-04-26

## 2021-04-26 DIAGNOSIS — R05.9 COUGH: ICD-10-CM

## 2021-08-03 PROBLEM — K21.9 LARYNGOPHARYNGEAL REFLUX: Status: RESOLVED | Noted: 2021-08-03 | Resolved: 2021-08-03

## 2021-08-18 ENCOUNTER — ANESTHESIA EVENT (OUTPATIENT)
Dept: ENDOSCOPY | Age: 56
End: 2021-08-18
Payer: COMMERCIAL

## 2021-08-19 ENCOUNTER — ANESTHESIA (OUTPATIENT)
Dept: ENDOSCOPY | Age: 56
End: 2021-08-19
Payer: COMMERCIAL

## 2021-08-19 ENCOUNTER — HOSPITAL ENCOUNTER (OUTPATIENT)
Age: 56
Setting detail: OUTPATIENT SURGERY
Discharge: HOME | End: 2021-08-19
Attending: INTERNAL MEDICINE | Admitting: INTERNAL MEDICINE
Payer: COMMERCIAL

## 2021-08-19 VITALS
HEIGHT: 71 IN | HEART RATE: 70 BPM | OXYGEN SATURATION: 97 % | RESPIRATION RATE: 16 BRPM | SYSTOLIC BLOOD PRESSURE: 149 MMHG | TEMPERATURE: 98.6 F | BODY MASS INDEX: 40.6 KG/M2 | WEIGHT: 290 LBS | DIASTOLIC BLOOD PRESSURE: 76 MMHG

## 2021-08-19 LAB
GLUCOSE BLD STRIP.AUTO-MCNC: 138 MG/DL (ref 65–100)
SERVICE CMNT-IMP: ABNORMAL

## 2021-08-19 PROCEDURE — 74011250636 HC RX REV CODE- 250/636: Performed by: ANESTHESIOLOGY

## 2021-08-19 PROCEDURE — 88305 TISSUE EXAM BY PATHOLOGIST: CPT

## 2021-08-19 PROCEDURE — 74011000250 HC RX REV CODE- 250: Performed by: REGISTERED NURSE

## 2021-08-19 PROCEDURE — 76040000025: Performed by: INTERNAL MEDICINE

## 2021-08-19 PROCEDURE — 76060000032 HC ANESTHESIA 0.5 TO 1 HR: Performed by: INTERNAL MEDICINE

## 2021-08-19 PROCEDURE — 74011250636 HC RX REV CODE- 250/636: Performed by: REGISTERED NURSE

## 2021-08-19 PROCEDURE — 77030021593 HC FCPS BIOP ENDOSC BSC -A: Performed by: INTERNAL MEDICINE

## 2021-08-19 PROCEDURE — 82962 GLUCOSE BLOOD TEST: CPT

## 2021-08-19 PROCEDURE — 2709999900 HC NON-CHARGEABLE SUPPLY: Performed by: INTERNAL MEDICINE

## 2021-08-19 RX ORDER — PROPOFOL 10 MG/ML
INJECTION, EMULSION INTRAVENOUS
Status: DISCONTINUED | OUTPATIENT
Start: 2021-08-19 | End: 2021-08-19 | Stop reason: HOSPADM

## 2021-08-19 RX ORDER — LIDOCAINE HYDROCHLORIDE 20 MG/ML
INJECTION, SOLUTION EPIDURAL; INFILTRATION; INTRACAUDAL; PERINEURAL AS NEEDED
Status: DISCONTINUED | OUTPATIENT
Start: 2021-08-19 | End: 2021-08-19 | Stop reason: HOSPADM

## 2021-08-19 RX ORDER — EPHEDRINE SULFATE/0.9% NACL/PF 50 MG/5 ML
SYRINGE (ML) INTRAVENOUS AS NEEDED
Status: DISCONTINUED | OUTPATIENT
Start: 2021-08-19 | End: 2021-08-19 | Stop reason: HOSPADM

## 2021-08-19 RX ORDER — PROPOFOL 10 MG/ML
INJECTION, EMULSION INTRAVENOUS AS NEEDED
Status: DISCONTINUED | OUTPATIENT
Start: 2021-08-19 | End: 2021-08-19 | Stop reason: HOSPADM

## 2021-08-19 RX ORDER — SODIUM CHLORIDE, SODIUM LACTATE, POTASSIUM CHLORIDE, CALCIUM CHLORIDE 600; 310; 30; 20 MG/100ML; MG/100ML; MG/100ML; MG/100ML
100 INJECTION, SOLUTION INTRAVENOUS CONTINUOUS
Status: DISCONTINUED | OUTPATIENT
Start: 2021-08-19 | End: 2021-08-19 | Stop reason: HOSPADM

## 2021-08-19 RX ORDER — DEXTROMETHORPHAN/PSEUDOEPHED 2.5-7.5/.8
20 DROPS ORAL
Status: DISCONTINUED | OUTPATIENT
Start: 2021-08-19 | End: 2021-08-19 | Stop reason: HOSPADM

## 2021-08-19 RX ADMIN — PROPOFOL 50 MG: 10 INJECTION, EMULSION INTRAVENOUS at 09:16

## 2021-08-19 RX ADMIN — PHENYLEPHRINE HYDROCHLORIDE 100 MCG: 10 INJECTION INTRAVENOUS at 09:36

## 2021-08-19 RX ADMIN — LIDOCAINE HYDROCHLORIDE 60 MG: 20 INJECTION, SOLUTION EPIDURAL; INFILTRATION; INTRACAUDAL; PERINEURAL at 09:16

## 2021-08-19 RX ADMIN — Medication 10 MG: at 09:37

## 2021-08-19 RX ADMIN — SODIUM CHLORIDE, SODIUM LACTATE, POTASSIUM CHLORIDE, AND CALCIUM CHLORIDE 100 ML/HR: 600; 310; 30; 20 INJECTION, SOLUTION INTRAVENOUS at 08:54

## 2021-08-19 RX ADMIN — PROPOFOL 200 MCG/KG/MIN: 10 INJECTION, EMULSION INTRAVENOUS at 09:16

## 2021-08-19 RX ADMIN — PHENYLEPHRINE HYDROCHLORIDE 100 MCG: 10 INJECTION INTRAVENOUS at 09:47

## 2021-08-19 NOTE — ANESTHESIA PREPROCEDURE EVALUATION
Anesthetic History   No history of anesthetic complications            Review of Systems / Medical History  Patient summary reviewed and pertinent labs reviewed    Pulmonary        Sleep apnea: CPAP           Neuro/Psych         Psychiatric history     Cardiovascular    Hypertension: well controlled              Exercise tolerance: >4 METS     GI/Hepatic/Renal     GERD: well controlled      Liver disease (fatty)     Endo/Other    Diabetes: well controlled, type 2    Morbid obesity and arthritis     Other Findings   Comments: LANDON  thrombocytopenia           Physical Exam    Airway  Mallampati: III  TM Distance: 4 - 6 cm  Neck ROM: normal range of motion   Mouth opening: Normal     Cardiovascular  Regular rate and rhythm,  S1 and S2 normal,  no murmur, click, rub, or gallop  Rhythm: regular  Rate: normal      Pertinent negatives: No murmur   Dental  No notable dental hx       Pulmonary  Breath sounds clear to auscultation               Abdominal  GI exam deferred       Other Findings            Anesthetic Plan    ASA: 3  Anesthesia type: total IV anesthesia          Induction: Intravenous  Anesthetic plan and risks discussed with: Patient

## 2021-08-19 NOTE — DISCHARGE INSTRUCTIONS
Gastrointestinal Esophagogastroduodenoscopy (EGD) - Upper Exam Discharge Instructions    1. Call Dr. Adry Davis at [phone] for any problems or questions. 2. Contact the doctor's office for follow up appointment as directed. 3. Medication may cause drowsiness for several hours, therefore:  · Do not drive or operate machinery for remainder of the day. · No alcohol today. · Do not make any important or legal decisions for 24 hours. · Do not sign any legal documents for 24 hours. 5. Ordinarily, you may resume regular diet and activity after exam unless otherwise specified by your physician. 6. For mild soreness in your throat you may use Cepacol throat lozenges or warm salt-water gargles as needed. Gastrointestinal Colonoscopy/Flexible Sigmoidoscopy - Lower Exam Discharge Instructions  1. Because of air put into your colon during exam, you may experience some abdominal distension, relieved by the passage of gas, for several hours. 2. Contact your physician if you have any of the following:  · Excessive amount of bleeding - large amount when having a bowel movement. Occasional specks of blood with bowel movement would not be unusual.  · Severe abdominal pain  · Fever or Chills  3. Polyp Removal - follow these additional instructions  · Clear liquid diet for the next meal (jell-o, broth, clear drinks)  · Soft diet for 24 hours, then resume regular diet   · Take Metamucil - 1 tablespoon in juice every morning for 3 days  · No Aspirin, Advil, Aleve, Nuprin, Ibuprofen, or medications that contain these drugs for 2 weeks. Any additional instructions:    1. Continue taking your Nadolol 40 mg every night. 2. Office will call you to review pathology results and to schedule future appointments.

## 2021-08-19 NOTE — OP NOTES
Procedure:  Colonoscopy    Date of Procedure:  8/19/2021    Patient:  Nakul Brooks.     1965    Indication:  History of colon polyps     Sedation:  MAC    Pre-Procedure Exam:    Mental status:  alert and oriented  Airway:  normal oropharyngeal airway and neck mobility  CV:  regular rate and rhythm   Respiratory:  clear to auscultation    Procedure:  A History and Physical has been performed, and patient medication allergies have been reviewed. Risks of perforation, hemorrhage, adverse drug reaction, and aspiration were discussed. Informed consent was obtained for the procedure, including sedation. The patient was placed in the left lateral decubitus position. The heart rate, oxygen saturations, blood pressure, and response to care were monitored throughout the procedure. After performing a rectal exam, the colonoscope was passed through the anus and advanced under direct vision to the cecum, identified by appendiceal orifice and ileocecal valve. The quality of prep was adequate. A careful inspection was made as the colonoscope was withdrawn, including a retroflexed view of the rectum. The patient tolerated the procedure well. Findings:     ANUS:  Anal exam reveals no masses or hemorrhoids. RECTUM:  Internal hemorrhoids were seen in the rectum. SIGMOID COLON:  The mucosa is normal with good vascular pattern and without ulcers, diverticula, and polyps. DESCENDING COLON:  The mucosa is normal with good vascular pattern and without ulcers, diverticula, and polyps. SPLENIC FLEXURE:  The splenic flexure is normal.   TRANSVERSE COLON:  Two 3 mm sessile polyps were removed using a cold forceps. HEPATIC FLEXURE:  The hepatic flexure is normal.   ASCENDING COLON:  The mucosa is normal with good vascular pattern and without ulcers, diverticula, and polyps.    CECUM:  The appendiceal orifice appears normal. The ileocecal valve appears normal.   TERMINAL ILEUM:  The terminal ileum was not entered. Specimen:  Yes    Estimated Blood Loss:  Minimal    Implant:  None           Impression:    Colon polyps. Internal hemorrhoids. Plan:  1. Follow up pathology results. 2. Repeat colonoscopy for surveillance based on pathology results. 3. Return to office in 3 months.     Signed:  Sunita Paul MD  8/19/2021  9:11 AM

## 2021-08-19 NOTE — ANESTHESIA POSTPROCEDURE EVALUATION
Procedure(s):  ESOPHAGOGASTRODUODENOSCOPY (EGD)  COLONOSCOPY/ 41  ENDOSCOPIC POLYPECTOMY. total IV anesthesia    Anesthesia Post Evaluation      Multimodal analgesia: multimodal analgesia used between 6 hours prior to anesthesia start to PACU discharge  Patient location during evaluation: bedside  Patient participation: complete - patient participated  Level of consciousness: awake and responsive to light touch  Pain management: adequate  Airway patency: patent  Anesthetic complications: no  Cardiovascular status: acceptable, hemodynamically stable, blood pressure returned to baseline and stable  Respiratory status: acceptable, unassisted, spontaneous ventilation and nonlabored ventilation  Hydration status: acceptable        INITIAL Post-op Vital signs:   Vitals Value Taken Time   /76 08/19/21 1021   Temp 37 °C (98.6 °F) 08/19/21 0951   Pulse 71 08/19/21 1025   Resp 16 08/19/21 1021   SpO2 97 % 08/19/21 1025   Vitals shown include unvalidated device data.

## 2021-08-19 NOTE — OP NOTES
Procedure:  Esophagogastroduodenoscopy    Date of Procedure:  8/19/2021    Patient:  Edyta Ross.     1965    Indication:  Cirrhosis, evaluate for esophageal varices     Sedation:  MAC    Pre-Procedure Physical Exam:    Mental status:  alert and oriented  Airway:  normal oropharyngeal airway and neck mobility  CV:  regular rate and rhythm  Respiratory:  clear to auscultation    Procedure:  A History and Physical has been performed, and patient medication allergies have been reviewed. Risks of perforation, hemorrhage, adverse drug reaction, and aspiration were discussed. Informed consent was obtained for the procedure, including sedation. The patient was placed in the left lateral decubitus position. The heart rate, oxygen saturations, blood pressure, and response to care were monitored throughout the procedure. The gastroscope was passed through the mouth and advanced under direct vision to the second portion of the duodenum. A careful inspection was made as the gastroscope was withdrawn, including a retroflexed view of the proximal stomach. The patient tolerated the procedure well. Findings:     OROPHARYNX: Cords and pyriform recesses appear normal.   ESOPHAGUS: Grade 2 varices were found in the middle and distal thirds of the esophagus. No red annemarie signs or other stigmata of recent hemorrhage were seen. STOMACH: On retroflexion, the flap-valve is classified as Hill Grade II, with a tissue fold at the lesser curvature but with periodic opening and closing around the endoscope. Portal hypertensive gastropathy was seen in the fundus and body. DUODENUM: The bulb and second portions are normal.    Specimen:  No    Estimated Blood Loss:  None    Implant:  None           Impression:    Grade 2 esophageal varices. Portal hypertensive gastropathy. Plan:  1. Nadolol 40 mg every night. 2. EGD in 12 months. 3. Proceed with colonoscopy as planned for today.      Signed:  Reena Higgins Lucie Palacios MD  8/19/2021  9:11 AM

## 2021-08-19 NOTE — H&P
History and Physical for Procedures             Date: 8/19/2021     History of Present Illness:  Patient presents to undergo EGD and colonoscopy. Past Medical History:   Diagnosis Date    Cancer [facility])     skin CA    Diabetes ([facility] [address]     type 2; dx 2017- avg fasting 130 -last A1C=6.2 on 4/16/18     Esophageal varices ([address]     controlled with med    GERD (gastroesophageal reflux disease)     controlled with med    Hearing reduced     Hoarseness     Hypertension     managed with meds    Laryngopharyngeal reflux     Liver disease     \"fatty liver-\"elevated enzymes: monitored by GI and PCP    Mild depression ([address] 09/18/2018    managed with meds    Morbid obesity ([address]     BMI- 44    LANDON (nonalcoholic steatohepatitis)     monitored by GI    Obstructive sleep apnea on CPAP 7/18/2016    Thrombocytopenia ([address]     monitored by hematologist     Past Surgical History:   Procedure Laterality Date    COLONOSCOPY N/A 8/30/2018    COLONOSCOPY/ 40 performed by Davis Maurice [facility] at [facility] ENDOSCOPY    HX HEENT      nasal surg    HX HEENT      Sinus Surg x2    HX MENISCUS REPAIR Left 03/01/2019    HX ORTHOPAEDIC Right 2008    wrist fx    HX ORTHOPAEDIC Left 2017    wrist fx    HX OTHER SURGICAL      Wrist surgery      Family History   Problem Relation Age of Onset    Cancer Mother     Diabetes Father     Dementia Father     Hypertension Father      Social History     Tobacco Use    Smoking status: Never Smoker    Smokeless tobacco: Never Used   Substance Use Topics    Alcohol use: No        No Known Allergies  No current facility-administered medications for this encounter.      Current Outpatient Medications   Medication Sig    metFORMIN ER (GLUCOPHAGE XR) 500 mg tablet TAKE 1 TABLET BY MOUTH EVERY DAY WITH DINNER    atorvastatin (LIPITOR) 10 mg tablet TAKE 1 TABLET BY MOUTH EVERY DAY    lisinopriL (PRINIVIL, ZESTRIL) 10 mg tablet TAKE 1 TABLET BY MOUTH EVERY DAY    sildenafil citrate (VIAGRA) 50 mg tablet Take 1 Tab by mouth as needed for Erectile Dysfunction.  venlafaxine-SR (EFFEXOR-XR) 75 mg capsule TAKE 1 CAPSULE BY MOUTH EVERY DAY    peg 400-propylene glycol (SYSTANE, PROPYLENE GLYCOL,) 0.4-0.3 % drop 1 Drop daily as needed.  lancets (FREESTYLE LANCETS) 28 gauge misc Testing daily as directed    FREESTYLE TEST strip Testing daily as directed    lactulose (CHRONULAC) 10 gram/15 mL solution Take  by mouth as needed.  nadolol (CORGARD) 40 mg tablet Take 40 mg by mouth nightly.  esomeprazole (NEXIUM) 20 mg capsule Take 20 mg by mouth daily.  fluticasone (FLONASE) 50 mcg/actuation nasal spray 2 Sprays by Both Nostrils route daily. (Patient taking differently: 2 Sprays by Both Nostrils route daily as needed.)        Review of Systems:  A detailed 10 organ review of systems is obtained with pertinent positives as listed in the History of Present Illness. All others are negative. Objective:     Physical Exam:  There were no vitals taken for this visit. General:  Alert and oriented. Heart: Regular rate and rhythm  Lungs:  Clear to auscultation bilaterally  Abdomen: Soft, nontender, nondistended    Impression/Plan:     Proceed with EGD and colonoscopy as planned. I have discussed with the patient the technique, benefits, alternatives, and risks of these procedures, including medication reaction, immediate or delayed bleeding, or perforation of the gastrointestinal tract.      Signed By: Esthela Carranza MD     August 19, 2021

## 2022-01-05 ENCOUNTER — HOSPITAL ENCOUNTER (OUTPATIENT)
Dept: ULTRASOUND IMAGING | Age: 57
Discharge: HOME | End: 2022-01-05
Attending: INTERNAL MEDICINE
Payer: COMMERCIAL

## 2022-01-05 DIAGNOSIS — M79.89 RIGHT LEG SWELLING: ICD-10-CM

## 2022-01-05 PROCEDURE — 93971 EXTREMITY STUDY: CPT

## 2022-01-05 NOTE — PROGRESS NOTES
No DVT on US -they do see an area in the calf that looks like prior hematoma as we discussed --no other treatment needed -may take another 1 mo or more to improve though

## 2022-03-18 PROBLEM — M17.12 OSTEOARTHRITIS OF LEFT KNEE: Status: ACTIVE | Noted: 2019-03-01

## 2022-03-18 PROBLEM — S83.282A TEAR OF LATERAL MENISCUS OF LEFT KNEE: Status: ACTIVE | Noted: 2019-03-01

## 2022-03-19 PROBLEM — M25.561 RIGHT KNEE PAIN: Status: ACTIVE | Noted: 2019-02-22

## 2022-03-19 PROBLEM — K76.0 FATTY INFILTRATION OF LIVER: Status: ACTIVE | Noted: 2018-04-16

## 2022-03-19 PROBLEM — I86.4 ESOPHAGEAL AND GASTRIC VARICES (HCC): Status: ACTIVE | Noted: 2018-09-18

## 2022-03-19 PROBLEM — D69.6 THROMBOCYTOPENIA (HCC): Status: ACTIVE | Noted: 2018-04-16

## 2022-03-19 PROBLEM — E66.01 SEVERE OBESITY (BMI 35.0-39.9) WITH COMORBIDITY (HCC): Status: ACTIVE | Noted: 2018-04-16

## 2022-03-19 PROBLEM — F32.A MILD DEPRESSION: Status: ACTIVE | Noted: 2018-09-18

## 2022-03-19 PROBLEM — M25.511 RIGHT SHOULDER PAIN: Status: ACTIVE | Noted: 2019-02-22

## 2022-03-19 PROBLEM — F41.9 ANXIETY: Status: ACTIVE | Noted: 2018-04-16

## 2022-03-19 PROBLEM — M94.261 CHONDROMALACIA OF RIGHT KNEE: Status: ACTIVE | Noted: 2018-12-01

## 2022-03-19 PROBLEM — K75.81 LIVER CIRRHOSIS SECONDARY TO NASH (NONALCOHOLIC STEATOHEPATITIS) (HCC): Status: ACTIVE | Noted: 2018-09-18

## 2022-03-19 PROBLEM — E11.9 CONTROLLED TYPE 2 DIABETES MELLITUS WITHOUT COMPLICATION, WITHOUT LONG-TERM CURRENT USE OF INSULIN (HCC): Status: ACTIVE | Noted: 2018-04-16

## 2022-03-19 PROBLEM — I85.00 ESOPHAGEAL AND GASTRIC VARICES (HCC): Status: ACTIVE | Noted: 2018-09-18

## 2022-03-19 PROBLEM — Z20.822 EXPOSURE TO COVID-19 VIRUS: Status: ACTIVE | Noted: 2020-07-13

## 2022-03-19 PROBLEM — K74.60 LIVER CIRRHOSIS SECONDARY TO NASH (NONALCOHOLIC STEATOHEPATITIS) (HCC): Status: ACTIVE | Noted: 2018-09-18

## 2022-03-19 PROBLEM — S83.232A COMPLEX TEAR OF MEDIAL MENISCUS OF LEFT KNEE: Status: ACTIVE | Noted: 2019-02-22

## 2022-03-20 PROBLEM — I10 ESSENTIAL HYPERTENSION: Status: ACTIVE | Noted: 2017-12-13

## 2022-03-20 PROBLEM — E78.00 HYPERCHOLESTEROLEMIA: Status: ACTIVE | Noted: 2017-12-13

## 2022-03-20 PROBLEM — M22.41 CHONDROMALACIA OF PATELLOFEMORAL JOINT, RIGHT: Status: ACTIVE | Noted: 2018-12-01

## 2022-03-20 PROBLEM — M22.42 CHONDROMALACIA OF PATELLOFEMORAL JOINT, LEFT: Status: ACTIVE | Noted: 2018-12-01

## 2022-06-27 RX ORDER — METFORMIN HYDROCHLORIDE 500 MG/1
500 TABLET, EXTENDED RELEASE ORAL DAILY
Qty: 90 TABLET | Refills: 3 | Status: SHIPPED | OUTPATIENT
Start: 2022-06-27

## 2022-06-27 RX ORDER — METFORMIN HYDROCHLORIDE 500 MG/1
TABLET, EXTENDED RELEASE ORAL
Qty: 90 TABLET | Refills: 3 | OUTPATIENT
Start: 2022-06-27

## 2022-07-21 ENCOUNTER — TELEPHONE (OUTPATIENT)
Dept: INTERNAL MEDICINE CLINIC | Facility: CLINIC | Age: 57
End: 2022-07-21

## 2022-07-22 ENCOUNTER — TELEPHONE (OUTPATIENT)
Dept: INTERNAL MEDICINE CLINIC | Facility: CLINIC | Age: 57
End: 2022-07-22

## 2022-07-22 DIAGNOSIS — E78.00 HYPERCHOLESTEROLEMIA: ICD-10-CM

## 2022-07-22 DIAGNOSIS — Z00.00 ROUTINE MEDICAL EXAM: ICD-10-CM

## 2022-07-22 DIAGNOSIS — I10 ESSENTIAL HYPERTENSION: Primary | ICD-10-CM

## 2022-07-22 DIAGNOSIS — E11.9 CONTROLLED TYPE 2 DIABETES MELLITUS WITHOUT COMPLICATION, WITHOUT LONG-TERM CURRENT USE OF INSULIN (HCC): ICD-10-CM

## 2022-08-05 DIAGNOSIS — Z00.00 ROUTINE MEDICAL EXAM: ICD-10-CM

## 2022-08-05 DIAGNOSIS — E11.9 CONTROLLED TYPE 2 DIABETES MELLITUS WITHOUT COMPLICATION, WITHOUT LONG-TERM CURRENT USE OF INSULIN (HCC): ICD-10-CM

## 2022-08-05 DIAGNOSIS — I10 ESSENTIAL HYPERTENSION: ICD-10-CM

## 2022-08-05 DIAGNOSIS — E78.00 HYPERCHOLESTEROLEMIA: ICD-10-CM

## 2022-08-05 LAB
ALBUMIN SERPL-MCNC: 3.1 G/DL (ref 3.5–5)
ALBUMIN/GLOB SERPL: 1.1 {RATIO} (ref 1.2–3.5)
ALP SERPL-CCNC: 65 U/L (ref 50–136)
ALT SERPL-CCNC: 56 U/L (ref 12–65)
ANION GAP SERPL CALC-SCNC: 5 MMOL/L (ref 7–16)
APPEARANCE UR: CLEAR
AST SERPL-CCNC: 37 U/L (ref 15–37)
BACTERIA URNS QL MICRO: NEGATIVE /HPF
BASOPHILS # BLD: 0 K/UL (ref 0–0.2)
BASOPHILS NFR BLD: 1 % (ref 0–2)
BILIRUB SERPL-MCNC: 1.7 MG/DL (ref 0.2–1.1)
BILIRUB UR QL: NEGATIVE
BUN SERPL-MCNC: 14 MG/DL (ref 6–23)
CALCIUM SERPL-MCNC: 8.4 MG/DL (ref 8.3–10.4)
CASTS URNS QL MICRO: ABNORMAL /LPF
CHLORIDE SERPL-SCNC: 114 MMOL/L (ref 98–107)
CHOLEST SERPL-MCNC: 140 MG/DL
CO2 SERPL-SCNC: 25 MMOL/L (ref 21–32)
COLOR UR: ABNORMAL
CREAT SERPL-MCNC: 0.7 MG/DL (ref 0.8–1.5)
DIFFERENTIAL METHOD BLD: ABNORMAL
EOSINOPHIL # BLD: 0.2 K/UL (ref 0–0.8)
EOSINOPHIL NFR BLD: 5 % (ref 0.5–7.8)
EPI CELLS #/AREA URNS HPF: ABNORMAL /HPF
ERYTHROCYTE [DISTWIDTH] IN BLOOD BY AUTOMATED COUNT: 15 % (ref 11.9–14.6)
EST. AVERAGE GLUCOSE BLD GHB EST-MCNC: 111 MG/DL
GLOBULIN SER CALC-MCNC: 2.8 G/DL (ref 2.3–3.5)
GLUCOSE SERPL-MCNC: 119 MG/DL (ref 65–100)
GLUCOSE UR STRIP.AUTO-MCNC: NEGATIVE MG/DL
HBA1C MFR BLD: 5.5 % (ref 4.8–5.6)
HCT VFR BLD AUTO: 37.2 % (ref 41.1–50.3)
HDLC SERPL-MCNC: 67 MG/DL (ref 40–60)
HDLC SERPL: 2.1 {RATIO}
HGB BLD-MCNC: 12.3 G/DL (ref 13.6–17.2)
HGB UR QL STRIP: NEGATIVE
IMM GRANULOCYTES # BLD AUTO: 0 K/UL (ref 0–0.5)
IMM GRANULOCYTES NFR BLD AUTO: 0 % (ref 0–5)
KETONES UR QL STRIP.AUTO: NEGATIVE MG/DL
LDLC SERPL CALC-MCNC: 58 MG/DL
LEUKOCYTE ESTERASE UR QL STRIP.AUTO: ABNORMAL
LYMPHOCYTES # BLD: 0.8 K/UL (ref 0.5–4.6)
LYMPHOCYTES NFR BLD: 22 % (ref 13–44)
MCH RBC QN AUTO: 31.7 PG (ref 26.1–32.9)
MCHC RBC AUTO-ENTMCNC: 33.1 G/DL (ref 31.4–35)
MCV RBC AUTO: 95.9 FL (ref 79.6–97.8)
MONOCYTES # BLD: 0.4 K/UL (ref 0.1–1.3)
MONOCYTES NFR BLD: 11 % (ref 4–12)
NEUTS SEG # BLD: 2.3 K/UL (ref 1.7–8.2)
NEUTS SEG NFR BLD: 61 % (ref 43–78)
NITRITE UR QL STRIP.AUTO: NEGATIVE
NRBC # BLD: 0 K/UL (ref 0–0.2)
PH UR STRIP: 8 [PH] (ref 5–9)
PLATELET # BLD AUTO: 71 K/UL (ref 150–450)
PMV BLD AUTO: 11.2 FL (ref 9.4–12.3)
POTASSIUM SERPL-SCNC: 4.9 MMOL/L (ref 3.5–5.1)
PROT SERPL-MCNC: 5.9 G/DL (ref 6.3–8.2)
PROT UR STRIP-MCNC: ABNORMAL MG/DL
RBC # BLD AUTO: 3.88 M/UL (ref 4.23–5.6)
RBC #/AREA URNS HPF: ABNORMAL /HPF
SODIUM SERPL-SCNC: 144 MMOL/L (ref 138–145)
SP GR UR REFRACTOMETRY: 1.02 (ref 1–1.02)
TRIGL SERPL-MCNC: 75 MG/DL (ref 35–150)
TSH, 3RD GENERATION: 0.85 UIU/ML (ref 0.36–3.74)
UROBILINOGEN UR QL STRIP.AUTO: 2 EU/DL (ref 0.2–1)
VLDLC SERPL CALC-MCNC: 15 MG/DL (ref 6–23)
WBC # BLD AUTO: 3.8 K/UL (ref 4.3–11.1)
WBC URNS QL MICRO: ABNORMAL /HPF

## 2022-09-02 ENCOUNTER — OFFICE VISIT (OUTPATIENT)
Dept: INTERNAL MEDICINE CLINIC | Facility: CLINIC | Age: 57
End: 2022-09-02
Payer: COMMERCIAL

## 2022-09-02 VITALS
WEIGHT: 291.5 LBS | DIASTOLIC BLOOD PRESSURE: 68 MMHG | HEIGHT: 71 IN | BODY MASS INDEX: 40.81 KG/M2 | SYSTOLIC BLOOD PRESSURE: 130 MMHG

## 2022-09-02 DIAGNOSIS — K75.81 LIVER CIRRHOSIS SECONDARY TO NASH (NONALCOHOLIC STEATOHEPATITIS) (HCC): ICD-10-CM

## 2022-09-02 DIAGNOSIS — Z23 ENCOUNTER FOR IMMUNIZATION: ICD-10-CM

## 2022-09-02 DIAGNOSIS — G47.33 OBSTRUCTIVE SLEEP APNEA ON CPAP: ICD-10-CM

## 2022-09-02 DIAGNOSIS — I85.00 ESOPHAGEAL AND GASTRIC VARICES (HCC): ICD-10-CM

## 2022-09-02 DIAGNOSIS — Z00.00 ANNUAL PHYSICAL EXAM: ICD-10-CM

## 2022-09-02 DIAGNOSIS — F41.9 ANXIETY: ICD-10-CM

## 2022-09-02 DIAGNOSIS — I86.4 ESOPHAGEAL AND GASTRIC VARICES (HCC): ICD-10-CM

## 2022-09-02 DIAGNOSIS — E11.9 CONTROLLED TYPE 2 DIABETES MELLITUS WITHOUT COMPLICATION, WITHOUT LONG-TERM CURRENT USE OF INSULIN (HCC): ICD-10-CM

## 2022-09-02 DIAGNOSIS — Z99.89 OBSTRUCTIVE SLEEP APNEA ON CPAP: ICD-10-CM

## 2022-09-02 DIAGNOSIS — I10 ESSENTIAL HYPERTENSION: ICD-10-CM

## 2022-09-02 DIAGNOSIS — K74.60 LIVER CIRRHOSIS SECONDARY TO NASH (NONALCOHOLIC STEATOHEPATITIS) (HCC): ICD-10-CM

## 2022-09-02 DIAGNOSIS — E66.01 SEVERE OBESITY (BMI 35.0-39.9) WITH COMORBIDITY (HCC): ICD-10-CM

## 2022-09-02 DIAGNOSIS — Z00.00 ANNUAL PHYSICAL EXAM: Primary | ICD-10-CM

## 2022-09-02 PROBLEM — Z20.822 EXPOSURE TO COVID-19 VIRUS: Status: RESOLVED | Noted: 2020-07-13 | Resolved: 2022-09-02

## 2022-09-02 PROCEDURE — 90471 IMMUNIZATION ADMIN: CPT | Performed by: INTERNAL MEDICINE

## 2022-09-02 PROCEDURE — 99396 PREV VISIT EST AGE 40-64: CPT | Performed by: INTERNAL MEDICINE

## 2022-09-02 PROCEDURE — 90677 PCV20 VACCINE IM: CPT | Performed by: INTERNAL MEDICINE

## 2022-09-02 RX ORDER — BLOOD-GLUCOSE METER
EACH MISCELLANEOUS
COMMUNITY
Start: 2019-03-04

## 2022-09-02 RX ORDER — BLOOD-GLUCOSE METER
EACH MISCELLANEOUS
COMMUNITY

## 2022-09-02 ASSESSMENT — ENCOUNTER SYMPTOMS
WHEEZING: 0
SORE THROAT: 0
EYE REDNESS: 0
DIARRHEA: 0
ABDOMINAL PAIN: 0
COUGH: 0
RHINORRHEA: 0
SHORTNESS OF BREATH: 0
BACK PAIN: 0
BLOOD IN STOOL: 0
ABDOMINAL DISTENTION: 0
NAUSEA: 0
CONSTIPATION: 0
VOICE CHANGE: 0
COLOR CHANGE: 0

## 2022-09-02 ASSESSMENT — PATIENT HEALTH QUESTIONNAIRE - PHQ9
7. TROUBLE CONCENTRATING ON THINGS, SUCH AS READING THE NEWSPAPER OR WATCHING TELEVISION: 0
SUM OF ALL RESPONSES TO PHQ QUESTIONS 1-9: 4
SUM OF ALL RESPONSES TO PHQ QUESTIONS 1-9: 4
4. FEELING TIRED OR HAVING LITTLE ENERGY: 1
3. TROUBLE FALLING OR STAYING ASLEEP: 1
8. MOVING OR SPEAKING SO SLOWLY THAT OTHER PEOPLE COULD HAVE NOTICED. OR THE OPPOSITE, BEING SO FIGETY OR RESTLESS THAT YOU HAVE BEEN MOVING AROUND A LOT MORE THAN USUAL: 0
SUM OF ALL RESPONSES TO PHQ QUESTIONS 1-9: 4
SUM OF ALL RESPONSES TO PHQ QUESTIONS 1-9: 4
9. THOUGHTS THAT YOU WOULD BE BETTER OFF DEAD, OR OF HURTING YOURSELF: 0
10. IF YOU CHECKED OFF ANY PROBLEMS, HOW DIFFICULT HAVE THESE PROBLEMS MADE IT FOR YOU TO DO YOUR WORK, TAKE CARE OF THINGS AT HOME, OR GET ALONG WITH OTHER PEOPLE: 0
6. FEELING BAD ABOUT YOURSELF - OR THAT YOU ARE A FAILURE OR HAVE LET YOURSELF OR YOUR FAMILY DOWN: 0
1. LITTLE INTEREST OR PLEASURE IN DOING THINGS: 0
SUM OF ALL RESPONSES TO PHQ9 QUESTIONS 1 & 2: 0
2. FEELING DOWN, DEPRESSED OR HOPELESS: 0
5. POOR APPETITE OR OVEREATING: 2

## 2022-09-02 NOTE — PROGRESS NOTES
SUBJECTIVE:   Raz Baker is a 55 y.o. male seen for a visit regarding   Chief Complaint   Patient presents with    Annual Exam     CPE-discuss lab work    Depression     Q 9 done        Other  This is a new (For CPE-sees GI for liver ; has not checked sugar in awhile ; activity is atwork; no family changes; to see dentist soon) problem. The current episode started today. Pertinent negatives include no abdominal pain, arthralgias, chest pain, congestion, coughing, fatigue, headaches, myalgias, nausea, numbness, rash, sore throat or weakness. Past Medical History, Past Surgical History, Family history, Social History, and Medications were all reviewed with the patient today and updated as necessary. Current Outpatient Medications   Medication Sig Dispense Refill    blood glucose test strips (RELION PREMIER TEST) strip Testing daily as directed      Blood Glucose Monitoring Suppl (RELION PRIME MONITOR) LES by Does not apply route      metFORMIN (GLUCOPHAGE-XR) 500 MG extended release tablet Take 1 tablet by mouth daily TAKE 1 TABLET BY MOUTH EVERY DAY WITH DINNER 90 tablet 3    atorvastatin (LIPITOR) 10 MG tablet Take 10 mg by mouth daily      esomeprazole (NEXIUM) 20 MG delayed release capsule Take 20 mg by mouth daily      fluticasone (FLONASE) 50 MCG/ACT nasal spray 2 sprays by Nasal route daily      lactulose (CHRONULAC) 10 GM/15ML solution Take by mouth as needed      lisinopril (PRINIVIL;ZESTRIL) 10 MG tablet Take 10 mg by mouth daily      nadolol (CORGARD) 40 MG tablet Take 40 mg by mouth      venlafaxine (EFFEXOR XR) 75 MG extended release capsule Take 75 mg by mouth daily      sildenafil (VIAGRA) 50 MG tablet Take 50 mg by mouth as needed (Patient not taking: Reported on 9/2/2022)       No current facility-administered medications for this visit.      No Known Allergies  Patient Active Problem List   Diagnosis    Obstructive sleep apnea on CPAP    Osteoarthritis of left knee    Tear of lateral meniscus of left knee    Right knee pain    Controlled type 2 diabetes mellitus without complication, without long-term current use of insulin (HCC)    Thrombocytopenia (HCC)    Allergic rhinitis    Liver cirrhosis secondary to CANNON (nonalcoholic steatohepatitis) (HCC)    Esophageal and gastric varices (HCC)    Gastroesophageal reflux disease without esophagitis    Hoarseness    Severe obesity (BMI 35.0-39. 9) with comorbidity ([address]    Right shoulder pain    Mild depression (HCC)    Complex tear of medial meniscus of left knee    Fatty infiltration of liver    Anxiety    Chondromalacia of right knee    Hypercholesterolemia    Chondromalacia of patellofemoral joint, right    Chondromalacia of patellofemoral joint, left    Essential hypertension     Social History     Tobacco Use    Smoking status: Never    Smokeless tobacco: Never   Substance Use Topics    Alcohol use: No          Review of Systems   Constitutional:  Negative for appetite change, fatigue and unexpected weight change. HENT:  Positive for hearing loss (bilateral -tire blew out 1.5 yr ago--got aids at [facility]). Negative for congestion, rhinorrhea, sneezing, sore throat and voice change. Eyes:  Negative for redness and visual disturbance. Respiratory:  Negative for cough, shortness of breath and wheezing. New CPAP this year-sees Dr Malina Castaneda at [facility]   Cardiovascular:  Negative for chest pain, palpitations and leg swelling. Gastrointestinal:  Negative for abdominal distention, abdominal pain, blood in stool, constipation, diarrhea and nausea. [facility]   Endocrine: Negative for cold intolerance, heat intolerance and polyuria. Genitourinary:  Negative for difficulty urinating, dysuria, frequency, hematuria and urgency. Musculoskeletal:  Negative for arthralgias, back pain, gait problem and myalgias. Skin:  Negative for color change and rash.    Allergic/Immunologic: Negative for environmental allergies and immunocompromised state. Neurological:  Negative for dizziness, syncope, weakness, numbness and headaches. Hematological:  Negative for adenopathy. Does not bruise/bleed easily. Psychiatric/Behavioral:  Negative for dysphoric mood and sleep disturbance. The patient is not nervous/anxious. Had a panic attack 11 yr ago-was put on effexor back then --at one time 225; one one past 4 yrs       OBJECTIVE:  /68   Ht 5' 11\" (1.803 m)   Wt 291 lb 8 oz (132.2 kg)   BMI 40.66 kg/m²      Physical Exam  Constitutional:       General: He is not in acute distress. Appearance: Normal appearance. HENT:      Head: Normocephalic and atraumatic. Right Ear: Tympanic membrane and external ear normal.      Left Ear: Tympanic membrane and external ear normal.      Nose: No congestion. Mouth/Throat:      Mouth: Mucous membranes are moist.      Pharynx: No oropharyngeal exudate or posterior oropharyngeal erythema. Eyes:      Conjunctiva/sclera: Conjunctivae normal.      Pupils: Pupils are equal, round, and reactive to light. Cardiovascular:      Rate and Rhythm: Normal rate and regular rhythm. Pulses: Normal pulses. Heart sounds: No murmur heard. Pulmonary:      Effort: Pulmonary effort is normal.      Breath sounds: No wheezing, rhonchi or rales. Abdominal:      General: Bowel sounds are normal. There is no distension. Palpations: There is no mass. Tenderness: There is no abdominal tenderness. Genitourinary:     Penis: Normal.       Testes: Normal.      Prostate: Normal. Not enlarged, not tender and no nodules present. Musculoskeletal:         General: Normal range of motion. Cervical back: Neck supple. Right lower leg: Edema (trace) present. Left lower leg: Edema (trace) present. Lymphadenopathy:      Cervical: No cervical adenopathy. Skin:     General: Skin is warm and dry. Findings: No rash. Neurological:      Mental Status: He is oriented to person, place, and time. Cranial Nerves: No cranial nerve deficit. Gait: Gait normal.      Deep Tendon Reflexes: Reflexes normal.   Psychiatric:         Mood and Affect: Mood normal.         Behavior: Behavior normal.          Medical problems and test results were reviewed with the patient today. ASSESSMENT and PLAN     1. Annual physical exam  -     PSA Screening; Future  2. Esophageal and gastric varices (HCC)  3. Essential hypertension  4. Obstructive sleep apnea on CPAP  5. Liver cirrhosis secondary to CANNON (nonalcoholic steatohepatitis) ([facility] [address]  6. Controlled type 2 diabetes mellitus without complication, without long-term current use of insulin ([facility] [address]  7. Severe obesity (BMI 35.0-39. 9) with comorbidity ([facility] [address]  8. Anxiety  9. Encounter for immunization  -     Pneumococcal, PCV20, PREVNAR 21, (age 25 yrs+), IM, PF   A1C good at 5.5 on 1 metformin-is not checking sugar much now-LDL and HDL good; platelets down and wbc from liver as before-colon 8/2021 with tubovillous-for 3 yr--liver enzymes stable-BP ok-he can start to reduce the effexor over the next month also  Current treatment plan is effective. no changes in therapy  lab results and schedule for future lab studies reviewed with patient  reviewed diet, exercise and weight control   Cardiovascular risk and specific lipid/ LDL goals reviewed  reviewed medications and side effects in detail   Discussed BMI and healthy weight and diet, weight bearing exercise, smoking avoidance, sun protection and medication compliance. Reviewed appropriate health maintenance screening. The patient was counseled regarding screening procedures and recommended schedules for regular prostate exam, PSA, self testicular exam, GI hemoccult testing, colonoscopy and recommended vaccinations. Return in about 6 months (around 3/2/2023).

## 2022-10-03 ENCOUNTER — PREP FOR PROCEDURE (OUTPATIENT)
Dept: ADMINISTRATIVE | Age: 57
End: 2022-10-03

## 2022-10-03 RX ORDER — SODIUM CHLORIDE 0.9 % (FLUSH) 0.9 %
5-40 SYRINGE (ML) INJECTION EVERY 12 HOURS SCHEDULED
Status: CANCELLED | OUTPATIENT
Start: 2022-10-03

## 2022-10-03 RX ORDER — SODIUM CHLORIDE 9 MG/ML
INJECTION, SOLUTION INTRAVENOUS PRN
Status: CANCELLED | OUTPATIENT
Start: 2022-10-03

## 2022-10-03 RX ORDER — SODIUM CHLORIDE 0.9 % (FLUSH) 0.9 %
5-40 SYRINGE (ML) INJECTION PRN
Status: CANCELLED | OUTPATIENT
Start: 2022-10-03

## 2022-10-05 ENCOUNTER — TELEPHONE (OUTPATIENT)
Dept: INTERNAL MEDICINE CLINIC | Facility: CLINIC | Age: 57
End: 2022-10-05

## 2022-10-05 NOTE — TELEPHONE ENCOUNTER
Pt would like to talk to Guilherme Goetz regarding some test results from another provider (specialist)- they are being sent to us.

## 2022-10-05 NOTE — TELEPHONE ENCOUNTER
Patient notified and now asking is there any type of supplement he can take to possibly help \"boost\" the platelet count at all or eat more of something?/TD

## 2022-10-05 NOTE — TELEPHONE ENCOUNTER
Spoke with patient and said he had lab work for Dr Hernandez Filter getting ready for the EGD that is scheduled for 10/26 but the lab work came back with several alerts on it and he is very concerned. He has tried several times to call Dr Paolo Rodriguez office but no one has responded or answered there. The CBC is in his chart and the platelets are on \"alert\" and asking would you advise him on what he needs to do? TD

## 2022-10-06 DIAGNOSIS — I10 ESSENTIAL (PRIMARY) HYPERTENSION: ICD-10-CM

## 2022-10-07 RX ORDER — ATORVASTATIN CALCIUM 10 MG/1
TABLET, FILM COATED ORAL
Qty: 90 TABLET | Refills: 3 | Status: SHIPPED | OUTPATIENT
Start: 2022-10-07

## 2022-10-07 RX ORDER — LISINOPRIL 10 MG/1
TABLET ORAL
Qty: 90 TABLET | Refills: 3 | Status: SHIPPED | OUTPATIENT
Start: 2022-10-07

## 2022-10-21 RX ORDER — ACETAMINOPHEN 500 MG
500 TABLET ORAL EVERY 6 HOURS PRN
COMMUNITY

## 2022-10-21 RX ORDER — ESOMEPRAZOLE MAGNESIUM 40 MG/1
40 FOR SUSPENSION ORAL DAILY
COMMUNITY

## 2022-10-21 RX ORDER — DICYCLOMINE HYDROCHLORIDE 10 MG/1
1 CAPSULE ORAL EVERY 6 HOURS PRN
COMMUNITY
Start: 2022-10-03

## 2022-10-21 RX ORDER — LANOLIN ALCOHOL/MO/W.PET/CERES
1000 CREAM (GRAM) TOPICAL DAILY
COMMUNITY

## 2022-10-21 NOTE — PERIOP NOTE
Patient name, , and procedure verified. Type: 1a; abbreviated assessment per anesthesia guidelines    Labs per anesthesia: none    Instructed will receive notificattion the day before procedure by the GI Lab of time of arrival for [facility] cases, and by Pre-op Department for [place] [address] OF [facility] cases. Arrival times should be called by 5 pm. If no phone is received the patient should contact their respective hospital. The GI lab telephone number is [phone] and ES Pre-op is [phone]. Follow diet and prep instructions per office including NPO status. If patient has NOT received instructions from office patient is advised to call surgeon office, verbalizes understanding. Bath or shower the night before and the am of surgery with non-mositurizing soap. No lotions, oils, powders, cologne on skin. No make up, eye make up or jewelry. Wear loose fitting comfortable, clean clothing. Must have adult present in building the entire time . TAKE ONLY THE FOLLOWING MEDICATION ON THE DAY OF THE PROCEDURE : atorvastatin, esomeprazole and venlafaxine      The following discharge instructions reviewed : medication given during procedure may cause drowsiness for several hours, therefore, patient must not drive or operate machinery for remainder of the day. Patient may not drink alcohol on the day of your procedure, and should resume regular diet and activity unless otherwise directed. You may experience abdominal distention for several hours that is relieved by the passage of gas. Contact your physician if you have any of the following: fever or chills, severe abdominal pain or excessive amount of bleeding or a large amount when having a bowel movement. Occasional specks of blood with bowel movement would not be unusual.      You may be required to pay a deductible or co-pay on the day of your procedure.  You can pre-pay by calling [phone] if your surgery is at the [facility] or [phone] if your surgery is at the [facility], located within [facility].

## 2022-10-24 DIAGNOSIS — Z00.00 ANNUAL PHYSICAL EXAM: ICD-10-CM

## 2022-10-25 ENCOUNTER — ANESTHESIA EVENT (OUTPATIENT)
Dept: ENDOSCOPY | Age: 57
End: 2022-10-25
Payer: COMMERCIAL

## 2022-10-25 LAB — PSA SERPL-MCNC: 0.4 NG/ML

## 2022-10-25 NOTE — PROGRESS NOTES
Confirmed arrival time 0600 and procedure time 0700 for tomorrow.  Patient will have a  to take them home that will be present during procedure

## 2022-10-26 ENCOUNTER — ANESTHESIA (OUTPATIENT)
Dept: ENDOSCOPY | Age: 57
End: 2022-10-26
Payer: COMMERCIAL

## 2022-10-26 ENCOUNTER — HOSPITAL ENCOUNTER (OUTPATIENT)
Age: 57
Setting detail: OUTPATIENT SURGERY
Discharge: HOME | End: 2022-10-26
Attending: INTERNAL MEDICINE | Admitting: INTERNAL MEDICINE
Payer: COMMERCIAL

## 2022-10-26 VITALS
HEIGHT: 71 IN | BODY MASS INDEX: 39.48 KG/M2 | WEIGHT: 282 LBS | RESPIRATION RATE: 16 BRPM | TEMPERATURE: 98.6 F | DIASTOLIC BLOOD PRESSURE: 72 MMHG | HEART RATE: 72 BPM | OXYGEN SATURATION: 100 % | SYSTOLIC BLOOD PRESSURE: 132 MMHG

## 2022-10-26 LAB
GLUCOSE BLD STRIP.AUTO-MCNC: 116 MG/DL (ref 65–100)
SERVICE CMNT-IMP: ABNORMAL

## 2022-10-26 PROCEDURE — 2709999900 HC NON-CHARGEABLE SUPPLY: Performed by: INTERNAL MEDICINE

## 2022-10-26 PROCEDURE — 7100000011 HC PHASE II RECOVERY - ADDTL 15 MIN: Performed by: INTERNAL MEDICINE

## 2022-10-26 PROCEDURE — 6360000002 HC RX W HCPCS

## 2022-10-26 PROCEDURE — 3700000000 HC ANESTHESIA ATTENDED CARE: Performed by: INTERNAL MEDICINE

## 2022-10-26 PROCEDURE — 2500000003 HC RX 250 WO HCPCS

## 2022-10-26 PROCEDURE — 3609013000 HC EGD TRANSORAL CONTROL BLEEDING ANY METHOD: Performed by: INTERNAL MEDICINE

## 2022-10-26 PROCEDURE — 7100000010 HC PHASE II RECOVERY - FIRST 15 MIN: Performed by: INTERNAL MEDICINE

## 2022-10-26 PROCEDURE — 3700000001 HC ADD 15 MINUTES (ANESTHESIA): Performed by: INTERNAL MEDICINE

## 2022-10-26 PROCEDURE — 82962 GLUCOSE BLOOD TEST: CPT

## 2022-10-26 PROCEDURE — 2580000003 HC RX 258: Performed by: ANESTHESIOLOGY

## 2022-10-26 RX ORDER — SODIUM CHLORIDE 9 MG/ML
INJECTION, SOLUTION INTRAVENOUS PRN
Status: DISCONTINUED | OUTPATIENT
Start: 2022-10-26 | End: 2022-10-26 | Stop reason: HOSPADM

## 2022-10-26 RX ORDER — MIDAZOLAM HYDROCHLORIDE 2 MG/2ML
2 INJECTION, SOLUTION INTRAMUSCULAR; INTRAVENOUS
Status: DISCONTINUED | OUTPATIENT
Start: 2022-10-26 | End: 2022-10-26 | Stop reason: HOSPADM

## 2022-10-26 RX ORDER — SODIUM CHLORIDE, SODIUM LACTATE, POTASSIUM CHLORIDE, CALCIUM CHLORIDE 600; 310; 30; 20 MG/100ML; MG/100ML; MG/100ML; MG/100ML
INJECTION, SOLUTION INTRAVENOUS CONTINUOUS
Status: DISCONTINUED | OUTPATIENT
Start: 2022-10-26 | End: 2022-10-26 | Stop reason: HOSPADM

## 2022-10-26 RX ORDER — PROPOFOL 10 MG/ML
INJECTION, EMULSION INTRAVENOUS PRN
Status: DISCONTINUED | OUTPATIENT
Start: 2022-10-26 | End: 2022-10-26 | Stop reason: SDUPTHER

## 2022-10-26 RX ORDER — SODIUM CHLORIDE 0.9 % (FLUSH) 0.9 %
5-40 SYRINGE (ML) INJECTION EVERY 12 HOURS SCHEDULED
Status: DISCONTINUED | OUTPATIENT
Start: 2022-10-26 | End: 2022-10-26 | Stop reason: HOSPADM

## 2022-10-26 RX ORDER — PROPOFOL 10 MG/ML
INJECTION, EMULSION INTRAVENOUS CONTINUOUS PRN
Status: DISCONTINUED | OUTPATIENT
Start: 2022-10-26 | End: 2022-10-26 | Stop reason: SDUPTHER

## 2022-10-26 RX ORDER — LIDOCAINE HYDROCHLORIDE 10 MG/ML
1 INJECTION, SOLUTION INFILTRATION; PERINEURAL
Status: DISCONTINUED | OUTPATIENT
Start: 2022-10-26 | End: 2022-10-26 | Stop reason: HOSPADM

## 2022-10-26 RX ORDER — SODIUM CHLORIDE 0.9 % (FLUSH) 0.9 %
5-40 SYRINGE (ML) INJECTION PRN
Status: DISCONTINUED | OUTPATIENT
Start: 2022-10-26 | End: 2022-10-26 | Stop reason: HOSPADM

## 2022-10-26 RX ORDER — LIDOCAINE HYDROCHLORIDE 40 MG/ML
INJECTION, SOLUTION RETROBULBAR; TOPICAL PRN
Status: DISCONTINUED | OUTPATIENT
Start: 2022-10-26 | End: 2022-10-26 | Stop reason: SDUPTHER

## 2022-10-26 RX ADMIN — PROPOFOL 200 MCG/KG/MIN: 10 INJECTION, EMULSION INTRAVENOUS at 07:05

## 2022-10-26 RX ADMIN — PROPOFOL 30 MG: 10 INJECTION, EMULSION INTRAVENOUS at 07:06

## 2022-10-26 RX ADMIN — PROPOFOL 20 MG: 10 INJECTION, EMULSION INTRAVENOUS at 07:07

## 2022-10-26 RX ADMIN — SODIUM CHLORIDE, POTASSIUM CHLORIDE, SODIUM LACTATE AND CALCIUM CHLORIDE: 600; 310; 30; 20 INJECTION, SOLUTION INTRAVENOUS at 06:42

## 2022-10-26 RX ADMIN — PROPOFOL 20 MG: 10 INJECTION, EMULSION INTRAVENOUS at 07:08

## 2022-10-26 RX ADMIN — PROPOFOL 50 MG: 10 INJECTION, EMULSION INTRAVENOUS at 07:05

## 2022-10-26 RX ADMIN — LIDOCAINE HYDROCHLORIDE 50 MG: 40 INJECTION, SOLUTION RETROBULBAR; TOPICAL at 07:05

## 2022-10-26 ASSESSMENT — PAIN - FUNCTIONAL ASSESSMENT: PAIN_FUNCTIONAL_ASSESSMENT: NONE - DENIES PAIN

## 2022-10-26 NOTE — PROGRESS NOTES
VSS. Discharge instructions reviewed with patient and wife and copy of instructions sent home with patient. Dr. Stephany Madrigal spoke with patient and wife prior to discharge. Questions answered. Discharged via car, wheeled out by staff. IV discontinued prior to discharge.

## 2022-10-26 NOTE — OP NOTE
Procedure:  Esophagogastroduodenoscopy    Date of Procedure:  10/26/2022    Patient:  Renetta Lovelace.     1965    Indication:  Evaluate esophageal varices    Sedation:  MAC    Pre-Procedure Physical Exam:    Mental status:  alert and oriented  Airway:  normal oropharyngeal airway and neck mobility  CV:  regular rate and rhythm  Respiratory:  clear to auscultation    Procedure:  A History and Physical has been performed, and patient medication allergies have been reviewed. Risks of perforation, hemorrhage, adverse drug reaction, and aspiration were discussed. Informed consent was obtained for the procedure, including sedation. The patient was placed in the left lateral decubitus position. The heart rate, oxygen saturations, blood pressure, and response to care were monitored throughout the procedure. The gastroscope was passed through the mouth and advanced under direct vision to the second portion of the duodenum. A careful inspection was made as the gastroscope was withdrawn, including a retroflexed view of the proximal stomach. The patient tolerated the procedure well. Findings:     OROPHARYNX: Cords and pyriform recesses appear normal.   ESOPHAGUS: Grade 1 varices were found in the middle and distal thirds of the esophagus. No red cecilia signs or other stigmata of recent hemorrhage were seen. STOMACH: On retroflexion, the flap-valve is classified as Hill Grade II, with a tissue fold at the lesser curvature but with periodic opening and closing around the endoscope. Mild portal hypertensive gastropathy. An 8 mm sessile polyp in the gastric cardia was seen and was oozing blood. A single resolution clip was placed to obtain hemostasis. DUODENUM: The bulb and second portions are normal.    Specimen:  No    Estimated Blood Loss:  None    Implant:  1 clip           Impression:    Grade 1 esophageal varices. Gastric polyp. Plan:  1. Resume diet and current meds.   2. Continue Nadolol 40 mg QHS. 3. Repeat EGD in 12 months with general anesthesia (option of polypectomy at that time)  4. Return to office in 3 months.     Signed:  Randy Cervantes MD  10/26/2022  7:01 AM

## 2022-10-26 NOTE — ANESTHESIA PRE PROCEDURE
Department of Anesthesiology  Preprocedure Note       Name:  Khadar Louis. Age:  65 y.o.  :  1965                                          MRN:  [id number]         Date:  10/25/2022      Surgeon: Swathi Kirk):  Mamadou Goff MD    Procedure: Procedure(s):  EGD ESOPHAGOGASTRODUODENOSCOPY/41    Medications prior to admission:   Prior to Admission medications    Medication Sig Start Date End Date Taking?  Authorizing Provider   esomeprazole Magnesium (NEXIUM) 40 MG PACK Take 40 mg by mouth daily   Yes Historical Provider, MD   acetaminophen (TYLENOL) 500 MG tablet Take 500 mg by mouth every 6 hours as needed for Pain   Yes Historical Provider, MD   vitamin B-12 (CYANOCOBALAMIN) 1000 MCG tablet Take 1,000 mcg by mouth daily   Yes Historical Provider, MD   dicyclomine (BENTYL) 10 MG capsule Take 1 capsule by mouth every 6 hours as needed 10/3/22   Historical Provider, MD   atorvastatin (LIPITOR) 10 MG tablet TAKE 1 TABLET BY MOUTH EVERY DAY  Patient taking differently: Take 10 mg by mouth every morning 10/7/22   Pastor Justine MD   lisinopril (PRINIVIL;ZESTRIL) 10 MG tablet TAKE 1 TABLET BY MOUTH EVERY DAY 10/7/22   Pastor Justine MD   blood glucose test strips (RELION PREMIER TEST) strip Testing daily as directed 3/4/19   Historical Provider, MD   Blood Glucose Monitoring Suppl ([address]) LES by Does not apply route    Historical Provider, MD   metFORMIN (GLUCOPHAGE-XR) 500 MG extended release tablet Take 1 tablet by mouth daily TAKE 1 TABLET BY MOUTH EVERY DAY WITH DINNER 22   Pastor Justine MD   fluticasone [place]) 50 MCG/ACT nasal spray 2 sprays by Nasal route daily 16   Ar Automatic Reconciliation   lactulose (CHRONULAC) 10 GM/15ML solution Take by mouth as needed    Ar Automatic Reconciliation   nadolol (CORGARD) 40 MG tablet Take 40 mg by mouth nightly 18   Ar Automatic Reconciliation   sildenafil (VIAGRA) 50 MG tablet Take 50 mg by mouth as needed  Patient not taking: No sig reported 11/25/20   Ar Automatic Reconciliation   venlafaxine (EFFEXOR XR) 75 MG extended release capsule Take 75 mg by mouth every other day 10/25/21   Ar Automatic Reconciliation       Current medications:    No current facility-administered medications for this encounter.      Current Outpatient Medications   Medication Sig Dispense Refill    esomeprazole Magnesium (NEXIUM) 40 MG PACK Take 40 mg by mouth daily      acetaminophen (TYLENOL) 500 MG tablet Take 500 mg by mouth every 6 hours as needed for Pain      vitamin B-12 (CYANOCOBALAMIN) 1000 MCG tablet Take 1,000 mcg by mouth daily      dicyclomine (BENTYL) 10 MG capsule Take 1 capsule by mouth every 6 hours as needed      atorvastatin (LIPITOR) 10 MG tablet TAKE 1 TABLET BY MOUTH EVERY DAY (Patient taking differently: Take 10 mg by mouth every morning) 90 tablet 3    lisinopril (PRINIVIL;ZESTRIL) 10 MG tablet TAKE 1 TABLET BY MOUTH EVERY DAY 90 tablet 3    blood glucose test strips (RELION PREMIER TEST) strip Testing daily as directed      Blood Glucose Monitoring Suppl (RELION PRIME MONITOR) LES by Does not apply route      metFORMIN (GLUCOPHAGE-XR) 500 MG extended release tablet Take 1 tablet by mouth daily TAKE 1 TABLET BY MOUTH EVERY DAY WITH DINNER 90 tablet 3    fluticasone (FLONASE) 50 MCG/ACT nasal spray 2 sprays by Nasal route daily      lactulose (CHRONULAC) 10 GM/15ML solution Take by mouth as needed      nadolol (CORGARD) 40 MG tablet Take 40 mg by mouth nightly      sildenafil (VIAGRA) 50 MG tablet Take 50 mg by mouth as needed (Patient not taking: No sig reported)      venlafaxine (EFFEXOR XR) 75 MG extended release capsule Take 75 mg by mouth every other day         Allergies:  No Known Allergies    Problem List:    Patient Active Problem List   Diagnosis Code    Obstructive sleep apnea on CPAP G47.33, Z99.89    Osteoarthritis of left knee M17.12    Tear of lateral meniscus of left knee S83.282A    Right knee pain M25.561    Controlled type 2 diabetes mellitus without complication, without long-term current use of insulin (HCC) E11.9    Thrombocytopenia (HCC) D69.6    Allergic rhinitis J30.9    Liver cirrhosis secondary to CANNON (nonalcoholic steatohepatitis) ([address] K75.81, K74.60    Esophageal and gastric varices (HCC) I85.00, I86.4    Gastroesophageal reflux disease without esophagitis K21.9    Hoarseness R49.0    Severe obesity (BMI 35.0-39. 9) with comorbidity ([address] E66.01    Right shoulder pain M25.511    Mild depression F32. A    Complex tear of medial meniscus of left knee S83.232A    Fatty infiltration of liver K76.0    Anxiety F41.9    Chondromalacia of right knee M94.261    Hypercholesterolemia E78.00    Chondromalacia of patellofemoral joint, right M22.41    Chondromalacia of patellofemoral joint, left M22.42    Essential hypertension I10       Past Medical History:        Diagnosis Date    Cancer ([address]     skin CA    Diabetes ([address] 2017    type 2; metformin - fbs 130- A1C 5.5 (8/2022)-- denies hypoglycemic episodes    Esophageal varices ([address]     controlled with med    GERD (gastroesophageal reflux disease)     nexium daily,well controlled    Hearing reduced     Hoarseness     Hypertension     managed with meds    Laryngopharyngeal reflux     nexium daily,well controlled    Liver disease     \"fatty liver-\"elevated enzymes: monitored by GI and PCP    Mild depression 09/18/2018    managed with meds    Morbid obesity ([address]     BMI- 44    CANNON (nonalcoholic steatohepatitis)     monitored by GI    Obstructive sleep apnea on CPAP 7/18/2016    Skin cancer     non-melanoma    Thrombocytopenia ([address]     2018 Dr Tylor Leon -       Past Surgical History:        Procedure Laterality Date    COLONOSCOPY N/A 8/30/2018    COLONOSCOPY/ 40 performed by Carina Del Rosario MD at [address] [place] [address] N/A 8/19/2021    COLONOSCOPY/ 41 performed by [facility] Walden MD at [facility] ENDOSCOPY    ESOPHAGOGASTRODUODENOSCOPY      multiple    KNEE SURGERY Left 03/01/2019    NASAL SEPTUM SURGERY      ORTHOPEDIC SURGERY Left 2017    wrist fx    ORTHOPEDIC SURGERY Right 2008    wrist fx    SINUS SURGERY         Social History:    Social History     Tobacco Use    Smoking status: Never    Smokeless tobacco: Never   Substance Use Topics    Alcohol use: No                                Counseling given: Not Answered      Vital Signs (Current):   Vitals:    10/21/22 0854   Weight: 282 lb (127.9 kg)   Height: 5' 11\" (1.803 m)                                              BP Readings from Last 3 Encounters:   09/02/22 130/68   01/21/22 122/82   01/04/22 124/76       NPO Status:                                                                                 BMI:   Wt Readings from Last 3 Encounters:   09/02/22 291 lb 8 oz (132.2 kg)   01/21/22 290 lb 8 oz (131.8 kg)   01/04/22 291 lb 8 oz (132.2 kg)     Body mass index is 39.33 kg/m².     CBC:   Lab Results   Component Value Date/Time    WBC 3.8 08/05/2022 10:11 AM    RBC 3.88 08/05/2022 10:11 AM    HGB 12.3 08/05/2022 10:11 AM    HCT 37.2 08/05/2022 10:11 AM    MCV 95.9 08/05/2022 10:11 AM    RDW 15.0 08/05/2022 10:11 AM    PLT 71 08/05/2022 10:11 AM       CMP:   Lab Results   Component Value Date/Time     08/05/2022 10:11 AM    K 4.9 08/05/2022 10:11 AM     08/05/2022 10:11 AM    CO2 25 08/05/2022 10:11 AM    BUN 14 08/05/2022 10:11 AM    CREATININE 0.70 08/05/2022 10:11 AM    GFRAA >60 08/05/2022 10:11 AM    AGRATIO 1.5 01/21/2022 02:32 PM    LABGLOM >60 08/05/2022 10:11 AM    GLUCOSE 119 08/05/2022 10:11 AM    PROT 5.9 08/05/2022 10:11 AM    CALCIUM 8.4 08/05/2022 10:11 AM    BILITOT 1.7 08/05/2022 10:11 AM    ALKPHOS 65 08/05/2022 10:11 AM    ALKPHOS 85 01/21/2022 02:32 PM    AST 37 08/05/2022 10:11 AM    ALT 56 08/05/2022 10:11 AM       POC Tests: No results for input(s): POCGLU, POCNA, POCK, POCCL, POCBUN, POCHEMO, POCHCT in the last 72 hours.    Coags: No results found for: PROTIME, INR, APTT    HCG (If Applicable): No results found for: PREGTESTUR, PREGSERUM, HCG, HCGQUANT     ABGs: No results found for: PHART, PO2ART, EGN0PDJ, RSH2SDH, BEART, R3TMWBZX     Type & Screen (If Applicable):  No results found for: LABABO, LABRH    Drug/Infectious Status (If Applicable):  No results found for: HIV, HEPCAB    COVID-19 Screening (If Applicable):   Lab Results   Component Value Date/Time    COVID19 Not Detected 08/16/2021 11:55 AM    COVID19 Performed 08/16/2021 11:55 AM           Anesthesia Evaluation  Patient summary reviewed  Airway: Mallampati: IV  TM distance: <3 FB   Neck ROM: limited  Mouth opening: < 3 FB and > = 3 FB   Dental:          Pulmonary:normal exam    (+) sleep apnea: on CPAP,                             Cardiovascular:  Exercise tolerance: good (>4 METS),   (+) hypertension: mild,                   Neuro/Psych:   Negative Neuro/Psych ROS              GI/Hepatic/Renal:   (+) GERD: well controlled, liver disease:, morbid obesity         ROS comment: Cirrhosis . Endo/Other:    (+) DiabetesType II DM, well controlled, , blood dyscrasia: thrombocytopenia, arthritis: OA., .                 Abdominal:             Vascular: negative vascular ROS. Other Findings:           Anesthesia Plan      TIVA     ASA 3       Induction: intravenous. Anesthetic plan and risks discussed with patient and spouse.                         Hans Christianson MD   10/25/2022

## 2022-10-26 NOTE — ANESTHESIA POSTPROCEDURE EVALUATION
Department of Anesthesiology  Postprocedure Note    Patient: Venus Fortune   MRN: [id number]  YOB: 1965  Date of evaluation: 10/26/2022      Procedure Summary     Date: 10/26/22 Room / Location: [facility] ENDO 03 / [facility] ENDOSCOPY    Anesthesia Start: 9579 Anesthesia Stop: 1403    Procedure: EGD CONTROL HEMORRHAGE (Upper GI Region) Diagnosis:       Nonalcoholic steatohepatitis      (Nonalcoholic steatohepatitis [T33.69])    Surgeons: Brandt Petit MD Responsible Provider: Marion Rodriguez MD    Anesthesia Type: TIVA ASA Status: 3          Anesthesia Type: TIVA    Constantino Phase I: Constantino Score: 10    Constantino Phase II:        Anesthesia Post Evaluation    Patient location during evaluation: PACU  Patient participation: complete - patient participated  Level of consciousness: awake  Pain score: 0  Airway patency: patent  Nausea & Vomiting: no nausea and no vomiting  Complications: no  Cardiovascular status: blood pressure returned to baseline and hemodynamically stable  Respiratory status: acceptable, spontaneous ventilation and nonlabored ventilation  Hydration status: euvolemic  Multimodal analgesia pain management approach

## 2022-10-26 NOTE — H&P
History and Physical for Procedure             Date: 10/26/2022     History of Present Illness:  Patient presents to undergo EGD.        Past Medical History:   Diagnosis Date    Cancer ([facility]     skin CA    Diabetes ([facility] 2017    type 2; metformin - fbs 130- A1C 5.5 (8/2022)-- denies hypoglycemic episodes    Esophageal varices ([facility]     controlled with med    GERD (gastroesophageal reflux disease)     nexium daily,well controlled    Hearing reduced     Hoarseness     Hypertension     managed with meds    Laryngopharyngeal reflux     nexium daily,well controlled    Liver disease     \"fatty liver-\"elevated enzymes: monitored by GI and PCP    Mild depression 09/18/2018    managed with meds    Morbid obesity ([facility]     BMI- 39    CANNON (nonalcoholic steatohepatitis)     monitored by GI    Obstructive sleep apnea on CPAP 7/18/2016    Skin cancer     non-melanoma    Thrombocytopenia ([facility]     2018 Dr Blaine Marina -     Past Surgical History:   Procedure Laterality Date    COLONOSCOPY N/A 8/30/2018    COLONOSCOPY/ 40 performed by Ricky Cohen MD at [facility] N/A 8/19/2021    COLONOSCOPY/ 41 performed by Ruthy Lewis MD at [facility] ENDOSCOPY    ESOPHAGOGASTRODUODENOSCOPY      multiple    KNEE SURGERY Left 03/01/2019    NASAL SEPTUM SURGERY      ORTHOPEDIC SURGERY Left 2017    wrist fx    ORTHOPEDIC SURGERY Right 2008    wrist fx    SINUS SURGERY       In and  Family History   Problem Relation Age of Onset    Hypertension Father     Dementia Father     Diabetes Father     Cancer Mother      Social History     Tobacco Use    Smoking status: Never    Smokeless tobacco: Never   Substance Use Topics    Alcohol use: No        No Known Allergies  Current Facility-Administered Medications   Medication Dose Route Frequency    lidocaine 1 % injection 1 mL  1 mL IntraDERmal Once PRN    lactated ringers infusion   IntraVENous Continuous    sodium chloride flush 0.9 % injection 5-40 mL  5-40 mL IntraVENous 2 times per day sodium chloride flush 0.9 % injection 5-40 mL  5-40 mL IntraVENous PRN    0.9 % sodium chloride infusion   IntraVENous PRN    midazolam PF (VERSED) injection 2 mg  2 mg IntraVENous Once PRN        Review of Systems:  A detailed 10 organ review of systems is obtained with pertinent positives as listed in the History of Present Illness. All others are negative. Objective:     Physical Exam:  /69   Pulse 71   Temp 98.5 °F (36.9 °C) (Oral)   Resp 18   Ht 5' 11\" (1.803 m)   Wt 282 lb (127.9 kg)   SpO2 97%   BMI 39.33 kg/m²    General:  Alert and oriented. Heart: Regular rate and rhythm  Lungs:  Clear to auscultation bilaterally  Abdomen: Soft, nontender, nondistended    Impression/Plan:     Proceed with EGD as planned. I have discussed with the patient the technique, benefits, alternatives, and risks of these procedures, including medication reaction, immediate or delayed bleeding, or perforation of the gastrointestinal tract.      Signed By: Brandt Petit MD     October 26, 2022

## 2022-10-26 NOTE — DISCHARGE INSTRUCTIONS
Gastrointestinal Esophagogastroduodenoscopy (EGD)/ Endoscopic Ultrasound(EUS)- Upper Exam Discharge Instructions    1. Call Dr. Melissa Cat  for any problems or questions. ([phone])  2. Contact the doctor's office for follow up appointment as directed. 3. Medication may cause drowsiness for several hours, therefore, do not drive or operate machinery for remainder of the day. 4. No alcohol today. 5. Do not make any important decisions such as signing legal paperwork. 6. Ordinarily, you may resume regular diet and activity after exam unless otherwise specified by your physician. 7. For mild soreness in your throat you may use Cepacol throat lozenges or warm  salt-water gargles as needed. Any additional instructions:   1. Resume diet and current meds. 2. Continue Nadolol 40 mg at night  3. Repeat EGD in 12 months   4. Return to office in 3 months. Instructions given to Micaela Wolf. and other family members.

## 2022-11-09 ENCOUNTER — TELEPHONE (OUTPATIENT)
Dept: INTERNAL MEDICINE CLINIC | Facility: CLINIC | Age: 57
End: 2022-11-09

## 2022-11-09 NOTE — TELEPHONE ENCOUNTER
Pt notified per Dr. Steve Nicole to try benadryl and melatonin and if no improvement would need at least VV to discuss. Pt agreed and said he wants to try the otc remedies first and if no improvement will call the office to schedule an appt.

## 2022-11-09 NOTE — TELEPHONE ENCOUNTER
Patient would like a call back to discuss some issues he is having regarding sleeping. Patient feels as if he only 3-4 hours a night. Patient does take medication to help him sleep, but is not sure if there is another OTC medication he could try or if he could be prescribed something. Patient does not want to become addicted to anything. He uses a C-PAP machine every night and has tried taking zquil, melatonin, and benadryl. The medication sometimes helps, but for the most part the patient still wakes up multiple times a night. I informed the patient he may need an appointment, but he would like to make sure there not something else he could try before scheduling.

## 2022-11-21 DIAGNOSIS — I10 ESSENTIAL (PRIMARY) HYPERTENSION: ICD-10-CM

## 2022-11-23 DIAGNOSIS — F32.0 MAJOR DEPRESSIVE DISORDER, SINGLE EPISODE, MILD (HCC): ICD-10-CM

## 2022-11-23 RX ORDER — LISINOPRIL 10 MG/1
10 TABLET ORAL DAILY
Qty: 90 TABLET | Refills: 3 | Status: SHIPPED | OUTPATIENT
Start: 2022-11-23

## 2022-12-07 RX ORDER — VENLAFAXINE HYDROCHLORIDE 75 MG/1
75 CAPSULE, EXTENDED RELEASE ORAL DAILY
Qty: 90 CAPSULE | Refills: 3 | Status: SHIPPED | OUTPATIENT
Start: 2022-12-07

## 2023-01-25 ENCOUNTER — TELEPHONE (OUTPATIENT)
Dept: INTERNAL MEDICINE CLINIC | Facility: CLINIC | Age: 58
End: 2023-01-25

## 2023-01-25 DIAGNOSIS — E11.9 CONTROLLED TYPE 2 DIABETES MELLITUS WITHOUT COMPLICATION, WITHOUT LONG-TERM CURRENT USE OF INSULIN (HCC): Primary | ICD-10-CM

## 2023-01-27 DIAGNOSIS — E11.9 CONTROLLED TYPE 2 DIABETES MELLITUS WITHOUT COMPLICATION, WITHOUT LONG-TERM CURRENT USE OF INSULIN (HCC): ICD-10-CM

## 2023-01-27 LAB
EST. AVERAGE GLUCOSE BLD GHB EST-MCNC: 117 MG/DL
HBA1C MFR BLD: 5.7 % (ref 4.8–5.6)

## 2023-02-17 ENCOUNTER — OFFICE VISIT (OUTPATIENT)
Dept: INTERNAL MEDICINE CLINIC | Facility: CLINIC | Age: 58
End: 2023-02-17
Payer: COMMERCIAL

## 2023-02-17 VITALS
HEIGHT: 71 IN | SYSTOLIC BLOOD PRESSURE: 132 MMHG | WEIGHT: 289.9 LBS | HEART RATE: 66 BPM | OXYGEN SATURATION: 98 % | BODY MASS INDEX: 40.59 KG/M2 | DIASTOLIC BLOOD PRESSURE: 82 MMHG

## 2023-02-17 DIAGNOSIS — K74.60 LIVER CIRRHOSIS SECONDARY TO NASH (NONALCOHOLIC STEATOHEPATITIS) (HCC): ICD-10-CM

## 2023-02-17 DIAGNOSIS — E66.01 OBESITY, CLASS III, BMI 40-49.9 (MORBID OBESITY) (HCC): ICD-10-CM

## 2023-02-17 DIAGNOSIS — G47.00 INSOMNIA, UNSPECIFIED TYPE: Primary | ICD-10-CM

## 2023-02-17 DIAGNOSIS — K75.81 LIVER CIRRHOSIS SECONDARY TO NASH (NONALCOHOLIC STEATOHEPATITIS) (HCC): ICD-10-CM

## 2023-02-17 PROCEDURE — 3079F DIAST BP 80-89 MM HG: CPT | Performed by: INTERNAL MEDICINE

## 2023-02-17 PROCEDURE — 3075F SYST BP GE 130 - 139MM HG: CPT | Performed by: INTERNAL MEDICINE

## 2023-02-17 PROCEDURE — 99213 OFFICE O/P EST LOW 20 MIN: CPT | Performed by: INTERNAL MEDICINE

## 2023-02-17 RX ORDER — MIRTAZAPINE 15 MG/1
15 TABLET, FILM COATED ORAL NIGHTLY
Qty: 30 TABLET | Refills: 5 | Status: SHIPPED | OUTPATIENT
Start: 2023-02-17

## 2023-02-17 RX ORDER — DIPHENHYDRAMINE HCL 25 MG
50 CAPSULE ORAL NIGHTLY PRN
COMMUNITY

## 2023-02-17 SDOH — ECONOMIC STABILITY: FOOD INSECURITY: WITHIN THE PAST 12 MONTHS, THE FOOD YOU BOUGHT JUST DIDN'T LAST AND YOU DIDN'T HAVE MONEY TO GET MORE.: PATIENT DECLINED

## 2023-02-17 SDOH — ECONOMIC STABILITY: FOOD INSECURITY: WITHIN THE PAST 12 MONTHS, YOU WORRIED THAT YOUR FOOD WOULD RUN OUT BEFORE YOU GOT MONEY TO BUY MORE.: PATIENT DECLINED

## 2023-02-17 SDOH — ECONOMIC STABILITY: HOUSING INSECURITY
IN THE LAST 12 MONTHS, WAS THERE A TIME WHEN YOU DID NOT HAVE A STEADY PLACE TO SLEEP OR SLEPT IN A SHELTER (INCLUDING NOW)?: PATIENT REFUSED

## 2023-02-17 SDOH — ECONOMIC STABILITY: INCOME INSECURITY: HOW HARD IS IT FOR YOU TO PAY FOR THE VERY BASICS LIKE FOOD, HOUSING, MEDICAL CARE, AND HEATING?: PATIENT DECLINED

## 2023-02-17 ASSESSMENT — PATIENT HEALTH QUESTIONNAIRE - PHQ9
SUM OF ALL RESPONSES TO PHQ QUESTIONS 1-9: 10
10. IF YOU CHECKED OFF ANY PROBLEMS, HOW DIFFICULT HAVE THESE PROBLEMS MADE IT FOR YOU TO DO YOUR WORK, TAKE CARE OF THINGS AT HOME, OR GET ALONG WITH OTHER PEOPLE: 1
2. FEELING DOWN, DEPRESSED OR HOPELESS: 0
6. FEELING BAD ABOUT YOURSELF - OR THAT YOU ARE A FAILURE OR HAVE LET YOURSELF OR YOUR FAMILY DOWN: 0
SUM OF ALL RESPONSES TO PHQ9 QUESTIONS 1 & 2: 1
3. TROUBLE FALLING OR STAYING ASLEEP: 3
5. POOR APPETITE OR OVEREATING: 0
SUM OF ALL RESPONSES TO PHQ QUESTIONS 1-9: 10
8. MOVING OR SPEAKING SO SLOWLY THAT OTHER PEOPLE COULD HAVE NOTICED. OR THE OPPOSITE, BEING SO FIGETY OR RESTLESS THAT YOU HAVE BEEN MOVING AROUND A LOT MORE THAN USUAL: 0
9. THOUGHTS THAT YOU WOULD BE BETTER OFF DEAD, OR OF HURTING YOURSELF: 0
SUM OF ALL RESPONSES TO PHQ QUESTIONS 1-9: 10
7. TROUBLE CONCENTRATING ON THINGS, SUCH AS READING THE NEWSPAPER OR WATCHING TELEVISION: 3
4. FEELING TIRED OR HAVING LITTLE ENERGY: 3
SUM OF ALL RESPONSES TO PHQ QUESTIONS 1-9: 10
1. LITTLE INTEREST OR PLEASURE IN DOING THINGS: 1

## 2023-02-17 NOTE — PROGRESS NOTES
SUBJECTIVE:   Burnie Hamman. is a 65 y.o. male seen for a visit regarding   Chief Complaint   Patient presents with    Insomnia     Has been alternating between Benadryl and Melatonin, for months, with little relief. Pt reports he will take both together and is able to fall asleep but not stay asleep. Insomnia  This is a chronic problem. The current episode started more than 1 month ago. The problem occurs daily (used benadry , melatonin--can go to sleep but 2 -3 am wakesand restless after-trouble going to sleep w/o benadryl /melatonin). The problem has been unchanged. Associated symptoms include fatigue. Past Medical History, Past Surgical History, Family history, Social History, and Medications were all reviewed with the patient today and updated as necessary.        Current Outpatient Medications   Medication Sig Dispense Refill    MELATONIN PO Take by mouth nightly as needed Dosage unknown      diphenhydrAMINE (BENADRYL ALLERGY) 25 MG capsule Take 50 mg by mouth nightly as needed for Itching      CPAP Machine MISC by Does not apply route at bedtime      mirtazapine (REMERON) 15 MG tablet Take 1 tablet by mouth nightly 30 tablet 5    lisinopril (PRINIVIL;ZESTRIL) 10 MG tablet Take 1 tablet by mouth daily 90 tablet 3    esomeprazole Magnesium (NEXIUM) 40 MG PACK Take 40 mg by mouth daily      acetaminophen (TYLENOL) 500 MG tablet Take 500 mg by mouth every 6 hours as needed for Pain      vitamin B-12 (CYANOCOBALAMIN) 1000 MCG tablet Take 1,000 mcg by mouth daily      atorvastatin (LIPITOR) 10 MG tablet TAKE 1 TABLET BY MOUTH EVERY DAY (Patient taking differently: Take 10 mg by mouth every morning) 90 tablet 3    blood glucose test strips (RELION PREMIER TEST) strip Testing daily as directed      Blood Glucose Monitoring Suppl (RELION PRIME MONITOR) LES by Does not apply route      metFORMIN (GLUCOPHAGE-XR) 500 MG extended release tablet Take 1 tablet by mouth daily TAKE 1 TABLET BY MOUTH EVERY DAY WITH DINNER 90 tablet 3    fluticasone (FLONASE) 50 MCG/ACT nasal spray 2 sprays by Nasal route daily      nadolol (CORGARD) 40 MG tablet Take 40 mg by mouth nightly      sildenafil (VIAGRA) 50 MG tablet Take 50 mg by mouth as needed       No current facility-administered medications for this visit. No Known Allergies  Patient Active Problem List   Diagnosis    Obstructive sleep apnea on CPAP    Osteoarthritis of left knee    Tear of lateral meniscus of left knee    Right knee pain    Controlled type 2 diabetes mellitus without complication, without long-term current use of insulin (HCC)    Thrombocytopenia (HCC)    Allergic rhinitis    Liver cirrhosis secondary to CANNON (nonalcoholic steatohepatitis) (HCC)    Esophageal and gastric varices (HCC)    Gastroesophageal reflux disease without esophagitis    Hoarseness    Severe obesity (BMI 35.0-39. 9) with comorbidity ([address]    Right shoulder pain    Mild depression    Complex tear of medial meniscus of left knee    Fatty infiltration of liver    Anxiety    Chondromalacia of right knee    Hypercholesterolemia    Chondromalacia of patellofemoral joint, right    Chondromalacia of patellofemoral joint, left    Essential hypertension     Social History     Tobacco Use    Smoking status: Never    Smokeless tobacco: Never   Substance Use Topics    Alcohol use: No          Review of Systems   Constitutional:  Positive for fatigue. Respiratory:          Uses CPAP and works ok; day drowsy when does not sleep   Psychiatric/Behavioral:  Positive for sleep disturbance. The patient has insomnia.          Was put on venlafaxine for a panic sx at urgent care several years ago-been trying to wean-has been more irritable due to sleep lack       OBJECTIVE:  /82 (Site: Left Upper Arm, Position: Sitting)   Pulse 66   Ht 5' 11\" (1.803 m)   Wt 289 lb 14.4 oz (131.5 kg)   SpO2 98%   BMI 40.43 kg/m²      Physical Exam       Medical problems and test results were reviewed with the patient today. ASSESSMENT and PLAN     1. Insomnia, unspecified type  -     mirtazapine (REMERON) 15 MG tablet; Take 1 tablet by mouth nightly, Disp-30 tablet, R-5Normal  2. Obesity, Class III, BMI 40-49.9 (morbid obesity) ([facility]  3. Liver cirrhosis secondary to CANNON (nonalcoholic steatohepatitis) ([facility]   Has initiation and maintenance issue-discussed mirtazepine , trazodone -stop effexor  reviewed medications and side effects in detail     No follow-ups on file.

## 2023-02-25 ENCOUNTER — HOSPITAL ENCOUNTER (INPATIENT)
Age: 58
LOS: 2 days | Discharge: HOME | DRG: 065 | End: 2023-02-27
Attending: EMERGENCY MEDICINE | Admitting: FAMILY MEDICINE
Payer: COMMERCIAL

## 2023-02-25 ENCOUNTER — APPOINTMENT (OUTPATIENT)
Dept: CT IMAGING | Age: 58
DRG: 065 | End: 2023-02-25
Payer: COMMERCIAL

## 2023-02-25 DIAGNOSIS — R40.4 TRANSIENT ALTERATION OF AWARENESS: ICD-10-CM

## 2023-02-25 DIAGNOSIS — R47.1 DYSARTHRIA: Primary | ICD-10-CM

## 2023-02-25 PROBLEM — I10 ESSENTIAL HYPERTENSION: Status: ACTIVE | Noted: 2017-12-13

## 2023-02-25 PROBLEM — I86.4 ESOPHAGEAL AND GASTRIC VARICES (HCC): Status: ACTIVE | Noted: 2018-09-18

## 2023-02-25 PROBLEM — K74.60 LIVER CIRRHOSIS SECONDARY TO NASH (NONALCOHOLIC STEATOHEPATITIS) (HCC): Status: ACTIVE | Noted: 2018-09-18

## 2023-02-25 PROBLEM — K75.81 LIVER CIRRHOSIS SECONDARY TO NASH (NONALCOHOLIC STEATOHEPATITIS) (HCC): Status: ACTIVE | Noted: 2018-09-18

## 2023-02-25 PROBLEM — I85.00 ESOPHAGEAL AND GASTRIC VARICES (HCC): Status: ACTIVE | Noted: 2018-09-18

## 2023-02-25 PROBLEM — E78.00 HYPERCHOLESTEROLEMIA: Status: ACTIVE | Noted: 2017-12-13

## 2023-02-25 PROBLEM — E11.9 CONTROLLED TYPE 2 DIABETES MELLITUS WITHOUT COMPLICATION, WITHOUT LONG-TERM CURRENT USE OF INSULIN (HCC): Status: ACTIVE | Noted: 2018-04-16

## 2023-02-25 PROBLEM — D69.6 THROMBOCYTOPENIA (HCC): Status: ACTIVE | Noted: 2018-04-16

## 2023-02-25 LAB
ALBUMIN SERPL-MCNC: 3 G/DL (ref 3.5–5)
ALBUMIN/GLOB SERPL: 1 (ref 0.4–1.6)
ALP SERPL-CCNC: 59 U/L (ref 50–136)
ALT SERPL-CCNC: 52 U/L (ref 12–65)
AMMONIA PLAS-SCNC: 32 UMOL/L (ref 11–32)
ANION GAP SERPL CALC-SCNC: 3 MMOL/L (ref 2–11)
APPEARANCE UR: CLEAR
APTT PPP: 30.4 SEC (ref 24.5–34.2)
AST SERPL-CCNC: 49 U/L (ref 15–37)
BACTERIA URNS QL MICRO: NEGATIVE /HPF
BASOPHILS # BLD: 0 K/UL (ref 0–0.2)
BASOPHILS NFR BLD: 1 % (ref 0–2)
BILIRUB SERPL-MCNC: 2 MG/DL (ref 0.2–1.1)
BILIRUB UR QL: NEGATIVE
BUN SERPL-MCNC: 12 MG/DL (ref 6–23)
CALCIUM SERPL-MCNC: 8.7 MG/DL (ref 8.3–10.4)
CASTS URNS QL MICRO: ABNORMAL /LPF
CHLORIDE SERPL-SCNC: 116 MMOL/L (ref 101–110)
CO2 SERPL-SCNC: 23 MMOL/L (ref 21–32)
COLOR UR: ABNORMAL
CREAT SERPL-MCNC: 0.7 MG/DL (ref 0.8–1.5)
DIFFERENTIAL METHOD BLD: ABNORMAL
EOSINOPHIL # BLD: 0.2 K/UL (ref 0–0.8)
EOSINOPHIL NFR BLD: 4 % (ref 0.5–7.8)
EPI CELLS #/AREA URNS HPF: ABNORMAL /HPF
ERYTHROCYTE [DISTWIDTH] IN BLOOD BY AUTOMATED COUNT: 16.1 % (ref 11.9–14.6)
GLOBULIN SER CALC-MCNC: 3.1 G/DL (ref 2.8–4.5)
GLUCOSE BLD STRIP.AUTO-MCNC: 105 MG/DL (ref 65–100)
GLUCOSE BLD STRIP.AUTO-MCNC: 115 MG/DL (ref 65–100)
GLUCOSE BLD STRIP.AUTO-MCNC: 122 MG/DL (ref 65–100)
GLUCOSE SERPL-MCNC: 125 MG/DL (ref 65–100)
GLUCOSE UR STRIP.AUTO-MCNC: NEGATIVE MG/DL
HCT VFR BLD AUTO: 34.1 % (ref 41.1–50.3)
HGB BLD-MCNC: 11.2 G/DL (ref 13.6–17.2)
HGB UR QL STRIP: ABNORMAL
IMM GRANULOCYTES # BLD AUTO: 0 K/UL (ref 0–0.5)
IMM GRANULOCYTES NFR BLD AUTO: 0 % (ref 0–5)
INR PPP: 1.2
KETONES UR QL STRIP.AUTO: NEGATIVE MG/DL
LACTATE SERPL-SCNC: 1.1 MMOL/L (ref 0.4–2)
LEUKOCYTE ESTERASE UR QL STRIP.AUTO: NEGATIVE
LIPASE SERPL-CCNC: 176 U/L (ref 73–393)
LYMPHOCYTES # BLD: 0.7 K/UL (ref 0.5–4.6)
LYMPHOCYTES NFR BLD: 16 % (ref 13–44)
MAGNESIUM SERPL-MCNC: 1.8 MG/DL (ref 1.8–2.4)
MCH RBC QN AUTO: 29.8 PG (ref 26.1–32.9)
MCHC RBC AUTO-ENTMCNC: 32.8 G/DL (ref 31.4–35)
MCV RBC AUTO: 90.7 FL (ref 82–102)
MONOCYTES # BLD: 0.4 K/UL (ref 0.1–1.3)
MONOCYTES NFR BLD: 10 % (ref 4–12)
NEUTS SEG # BLD: 2.8 K/UL (ref 1.7–8.2)
NEUTS SEG NFR BLD: 69 % (ref 43–78)
NITRITE UR QL STRIP.AUTO: NEGATIVE
NRBC # BLD: 0 K/UL (ref 0–0.2)
PH UR STRIP: 6 (ref 5–9)
PLATELET # BLD AUTO: 75 K/UL (ref 150–450)
PMV BLD AUTO: 11 FL (ref 9.4–12.3)
POTASSIUM SERPL-SCNC: 4.6 MMOL/L (ref 3.5–5.1)
PROT SERPL-MCNC: 6.1 G/DL (ref 6.3–8.2)
PROT UR STRIP-MCNC: NEGATIVE MG/DL
PROTHROMBIN TIME: 16 SEC (ref 12.6–14.3)
RBC # BLD AUTO: 3.76 M/UL (ref 4.23–5.6)
RBC #/AREA URNS HPF: ABNORMAL /HPF
SARS-COV-2 RDRP RESP QL NAA+PROBE: NOT DETECTED
SERVICE CMNT-IMP: ABNORMAL
SODIUM SERPL-SCNC: 142 MMOL/L (ref 133–143)
SOURCE: NORMAL
SP GR UR REFRACTOMETRY: 1.01 (ref 1–1.02)
TSH, 3RD GENERATION: 1.09 UIU/ML (ref 0.36–3.74)
UROBILINOGEN UR QL STRIP.AUTO: 0.2 EU/DL (ref 0.2–1)
WBC # BLD AUTO: 4 K/UL (ref 4.3–11.1)
WBC URNS QL MICRO: ABNORMAL /HPF

## 2023-02-25 PROCEDURE — 1100000003 HC PRIVATE W/ TELEMETRY

## 2023-02-25 PROCEDURE — 2580000003 HC RX 258: Performed by: EMERGENCY MEDICINE

## 2023-02-25 PROCEDURE — 70498 CT ANGIOGRAPHY NECK: CPT

## 2023-02-25 PROCEDURE — 83605 ASSAY OF LACTIC ACID: CPT

## 2023-02-25 PROCEDURE — 6370000000 HC RX 637 (ALT 250 FOR IP): Performed by: FAMILY MEDICINE

## 2023-02-25 PROCEDURE — 85025 COMPLETE CBC W/AUTO DIFF WBC: CPT

## 2023-02-25 PROCEDURE — 83690 ASSAY OF LIPASE: CPT

## 2023-02-25 PROCEDURE — 85730 THROMBOPLASTIN TIME PARTIAL: CPT

## 2023-02-25 PROCEDURE — 83735 ASSAY OF MAGNESIUM: CPT

## 2023-02-25 PROCEDURE — 85610 PROTHROMBIN TIME: CPT

## 2023-02-25 PROCEDURE — 87635 SARS-COV-2 COVID-19 AMP PRB: CPT

## 2023-02-25 PROCEDURE — 99285 EMERGENCY DEPT VISIT HI MDM: CPT

## 2023-02-25 PROCEDURE — 82962 GLUCOSE BLOOD TEST: CPT

## 2023-02-25 PROCEDURE — 80053 COMPREHEN METABOLIC PANEL: CPT

## 2023-02-25 PROCEDURE — 84443 ASSAY THYROID STIM HORMONE: CPT

## 2023-02-25 PROCEDURE — 82140 ASSAY OF AMMONIA: CPT

## 2023-02-25 PROCEDURE — 81001 URINALYSIS AUTO W/SCOPE: CPT

## 2023-02-25 PROCEDURE — 6360000004 HC RX CONTRAST MEDICATION: Performed by: FAMILY MEDICINE

## 2023-02-25 PROCEDURE — 70450 CT HEAD/BRAIN W/O DYE: CPT

## 2023-02-25 RX ORDER — LACTULOSE 10 G/15ML
20 SOLUTION ORAL 2 TIMES DAILY
COMMUNITY

## 2023-02-25 RX ORDER — LISINOPRIL 5 MG/1
10 TABLET ORAL DAILY
Status: DISCONTINUED | OUTPATIENT
Start: 2023-02-26 | End: 2023-02-27 | Stop reason: HOSPADM

## 2023-02-25 RX ORDER — BISACODYL 10 MG
10 SUPPOSITORY, RECTAL RECTAL DAILY PRN
Status: DISCONTINUED | OUTPATIENT
Start: 2023-02-25 | End: 2023-02-27 | Stop reason: HOSPADM

## 2023-02-25 RX ORDER — LABETALOL HYDROCHLORIDE 5 MG/ML
10 INJECTION, SOLUTION INTRAVENOUS EVERY 10 MIN PRN
Status: DISCONTINUED | OUTPATIENT
Start: 2023-02-25 | End: 2023-02-27 | Stop reason: HOSPADM

## 2023-02-25 RX ORDER — INSULIN LISPRO 100 [IU]/ML
0-4 INJECTION, SOLUTION INTRAVENOUS; SUBCUTANEOUS NIGHTLY
Status: DISCONTINUED | OUTPATIENT
Start: 2023-02-25 | End: 2023-02-27 | Stop reason: HOSPADM

## 2023-02-25 RX ORDER — NICOTINE 21 MG/24HR
1 PATCH, TRANSDERMAL 24 HOURS TRANSDERMAL DAILY PRN
Status: DISCONTINUED | OUTPATIENT
Start: 2023-02-25 | End: 2023-02-27 | Stop reason: HOSPADM

## 2023-02-25 RX ORDER — PANTOPRAZOLE SODIUM 40 MG/1
40 TABLET, DELAYED RELEASE ORAL
Status: DISCONTINUED | OUTPATIENT
Start: 2023-02-26 | End: 2023-02-27 | Stop reason: HOSPADM

## 2023-02-25 RX ORDER — POLYETHYLENE GLYCOL 3350 17 G/17G
17 POWDER, FOR SOLUTION ORAL DAILY PRN
Status: DISCONTINUED | OUTPATIENT
Start: 2023-02-25 | End: 2023-02-27 | Stop reason: HOSPADM

## 2023-02-25 RX ORDER — SODIUM CHLORIDE 9 MG/ML
INJECTION, SOLUTION INTRAVENOUS CONTINUOUS
Status: DISCONTINUED | OUTPATIENT
Start: 2023-02-25 | End: 2023-02-25

## 2023-02-25 RX ORDER — ASPIRIN 81 MG/1
81 TABLET ORAL DAILY
Status: DISCONTINUED | OUTPATIENT
Start: 2023-02-25 | End: 2023-02-27 | Stop reason: HOSPADM

## 2023-02-25 RX ORDER — ATORVASTATIN CALCIUM 40 MG/1
40 TABLET, FILM COATED ORAL EVERY MORNING
Status: DISCONTINUED | OUTPATIENT
Start: 2023-02-26 | End: 2023-02-27 | Stop reason: HOSPADM

## 2023-02-25 RX ORDER — ACETAMINOPHEN 325 MG/1
650 TABLET ORAL EVERY 4 HOURS PRN
Status: DISCONTINUED | OUTPATIENT
Start: 2023-02-25 | End: 2023-02-27 | Stop reason: HOSPADM

## 2023-02-25 RX ORDER — INSULIN LISPRO 100 [IU]/ML
0-8 INJECTION, SOLUTION INTRAVENOUS; SUBCUTANEOUS
Status: DISCONTINUED | OUTPATIENT
Start: 2023-02-25 | End: 2023-02-27 | Stop reason: HOSPADM

## 2023-02-25 RX ORDER — DEXTROSE MONOHYDRATE 100 MG/ML
INJECTION, SOLUTION INTRAVENOUS CONTINUOUS PRN
Status: DISCONTINUED | OUTPATIENT
Start: 2023-02-25 | End: 2023-02-27 | Stop reason: HOSPADM

## 2023-02-25 RX ORDER — ASPIRIN 300 MG/1
300 SUPPOSITORY RECTAL DAILY
Status: DISCONTINUED | OUTPATIENT
Start: 2023-02-25 | End: 2023-02-26

## 2023-02-25 RX ORDER — ONDANSETRON 2 MG/ML
4 INJECTION INTRAMUSCULAR; INTRAVENOUS EVERY 6 HOURS PRN
Status: DISCONTINUED | OUTPATIENT
Start: 2023-02-25 | End: 2023-02-25

## 2023-02-25 RX ORDER — FLUTICASONE PROPIONATE 50 MCG
2 SPRAY, SUSPENSION (ML) NASAL DAILY
Status: DISCONTINUED | OUTPATIENT
Start: 2023-02-26 | End: 2023-02-27 | Stop reason: HOSPADM

## 2023-02-25 RX ORDER — ACETAMINOPHEN 650 MG/1
650 SUPPOSITORY RECTAL EVERY 4 HOURS PRN
Status: DISCONTINUED | OUTPATIENT
Start: 2023-02-25 | End: 2023-02-26

## 2023-02-25 RX ORDER — LANOLIN ALCOHOL/MO/W.PET/CERES
3 CREAM (GRAM) TOPICAL NIGHTLY PRN
Status: DISCONTINUED | OUTPATIENT
Start: 2023-02-25 | End: 2023-02-27 | Stop reason: HOSPADM

## 2023-02-25 RX ORDER — ONDANSETRON 4 MG/1
4 TABLET, ORALLY DISINTEGRATING ORAL EVERY 8 HOURS PRN
Status: DISCONTINUED | OUTPATIENT
Start: 2023-02-25 | End: 2023-02-25

## 2023-02-25 RX ADMIN — ASPIRIN 81 MG: 81 TABLET ORAL at 23:38

## 2023-02-25 RX ADMIN — IOPAMIDOL 100 ML: 755 INJECTION, SOLUTION INTRAVENOUS at 19:40

## 2023-02-25 RX ADMIN — SODIUM CHLORIDE: 9 INJECTION, SOLUTION INTRAVENOUS at 16:39

## 2023-02-25 ASSESSMENT — ENCOUNTER SYMPTOMS
NAUSEA: 0
SHORTNESS OF BREATH: 0
DIARRHEA: 0
EYE REDNESS: 0
SORE THROAT: 0
WHEEZING: 0
COUGH: 0
COLOR CHANGE: 0
VOMITING: 0
ABDOMINAL PAIN: 0

## 2023-02-25 ASSESSMENT — LIFESTYLE VARIABLES
HOW MANY STANDARD DRINKS CONTAINING ALCOHOL DO YOU HAVE ON A TYPICAL DAY: PATIENT DOES NOT DRINK
HOW OFTEN DO YOU HAVE A DRINK CONTAINING ALCOHOL: NEVER

## 2023-02-25 NOTE — ED TRIAGE NOTES
Pt. Arrives via ems from home today after family observed dysarthria like speech issues along with being mentally altered and not at baseline. BP: 130/70, P: 75, o2 99% room air. Temp. 98.4, .

## 2023-02-25 NOTE — ED PROVIDER NOTES
Emergency Department Provider Note                   PCP:                Woodrow Miranda MD               Age: 65 y.o. Sex: male       ICD-10-CM    1. Dysarthria  R47.1 Transthoracic echocardiogram (TTE) complete with contrast, bubble, strain, and 3D PRN     Transthoracic echocardiogram (TTE) complete with contrast, bubble, strain, and 3D PRN      2. Transient alteration of awareness  R40.4           DISPOSITION Admitted 02/25/2023 07:34:49 PM        Medical Decision Making  He does not have any focal symptoms to suggest a stroke. He does not appear to have any fever or infectious symptoms to suggest an encephalitis or meningitis. He may potentially have an intracranial abnormality so I will get a CT scan of his head. Potentially his electrolytes given his history of diabetes and diabetes medications could be significantly abnormal so I will check all of his blood work. Here his blood sugar was 115. I will check a COVID swab in case this is a manifestation of a COVID brain fog. Amount and/or Complexity of Data Reviewed  Labs: ordered. Radiology: ordered. Risk  Prescription drug management. Decision regarding hospitalization. ED Course as of 02/25/23 1937   Sat Feb 25, 2023   1708 Head CT scan does not reveal any acute intracranial abnormality. He had an episode here where he did not recognize his wife and did not put his hearing aids in quite properly. Within a couple of minutes he was able to fix the issue and then did remember his wife's name and the rest of his family. [AC]   46 Patient's wife, son, and daughter-in-law have arrived. I was able to get independent history from each of them. The wife mentioned that the patient does have a history of liver cirrhosis with a recent Meld score of 11. He has never been a drinker and the prevailing theory is that his liver issue is from a fatty liver from poor lifestyle choices.   Therefore I have added on an ammonia level. His wife thinks that he may not have been taking his lactulose as it can interfere with his job when it does give him some diarrhea. [AC]   18 I reviewed his external medical records from his primary care doctor's office. [AC]   Taisha Glass His blood work is relatively unremarkable including his electrolytes and ammonia level. Family says that his mental status at the moment seems normal.  I do not have an expiration for the waxing and waning. Potentially he may have had a TIA or something similar. I will discuss the case with the hospitalist for admission. [AC]      ED Course User Index  [AC] Shira Durham MD        Orders Placed This Encounter   Procedures    COVID-19, Rapid    CT HEAD WO CONTRAST    CTA head neck with contrast    MRI brain without contrast    CBC with Auto Differential    Comprehensive Metabolic Panel    Lipase    Magnesium    TSH    Lactic Acid    Urinalysis    Protime-INR    APTT    Ammonia    Basic Metabolic Panel w/ Reflex to MG    CBC    Hemoglobin A1c    Lipid Panel    ADULT DIET; Easy to Chew; 4 carb choices (60 gm/meal); Low Fat/Low Chol/High Fiber/2 gm Na    HYPOGLYCEMIA TREATMENT: blood glucose less than 70 mg/dL and patient ALERT and TOLERATING PO    HYPOGLYCEMIA TREATMENT: blood glucose less than 70 mg/dL and patient NOT ALERT or NPO    Vital signs per unit routine    Vital signs    Telemetry monitoring - 48 hour duration    Up with assistance    NIHSS/Neuro Checks    Tobacco cessation education    Swallow screen by nursing before diet and oral medications started.     Stroke education    Place intermittent pneumatic compression device    Full code    Consult to Physical Medicine Rehab    Inpatient consult to Case Management    OT eval and treat    PT evaluation and treat    Initiate Oxygen Therapy Protocol    Home BIPAP or CPAP    Speech Language Pathology (SLP) eval and treat    POCT Glucose    POCT Glucose    POCT Glucose    Saline lock IV    ADMIT TO INPATIENT Medications   0.9 % sodium chloride infusion ( IntraVENous New Bag 2/25/23 6293)   atorvastatin (LIPITOR) tablet 40 mg (has no administration in time range)   pantoprazole (PROTONIX) tablet 40 mg (has no administration in time range)   fluticasone (FLONASE) 50 MCG/ACT nasal spray 2 spray (has no administration in time range)   lisinopril (PRINIVIL;ZESTRIL) tablet 10 mg (has no administration in time range)   melatonin tablet 3 mg (has no administration in time range)   insulin lispro (HUMALOG) injection vial 0-8 Units (has no administration in time range)   insulin lispro (HUMALOG) injection vial 0-4 Units (has no administration in time range)   glucose chewable tablet 16 g (has no administration in time range)   dextrose bolus 10% 125 mL (has no administration in time range)     Or   dextrose bolus 10% 250 mL (has no administration in time range)   glucagon (rDNA) injection 1 mg (has no administration in time range)   dextrose 10 % infusion (has no administration in time range)   nicotine (NICODERM CQ) 14 MG/24HR 1 patch (has no administration in time range)   acetaminophen (TYLENOL) tablet 650 mg (has no administration in time range)     Or   acetaminophen (TYLENOL) suppository 650 mg (has no administration in time range)   ondansetron (ZOFRAN-ODT) disintegrating tablet 4 mg (has no administration in time range)     Or   ondansetron (ZOFRAN) injection 4 mg (has no administration in time range)   polyethylene glycol (GLYCOLAX) packet 17 g (has no administration in time range)   bisacodyl (DULCOLAX) suppository 10 mg (has no administration in time range)   aspirin EC tablet 81 mg (has no administration in time range)     Or   aspirin suppository 300 mg (has no administration in time range)   labetalol (NORMODYNE;TRANDATE) injection 10 mg (has no administration in time range)       New Prescriptions    No medications on file        Masood Herzog is a 65 y.o. male who presents to the Emergency Department with chief complaint of  No chief complaint on file. 69-year-old gentleman presents with concerns about confusion and not feeling like his normal self. He says this started 2 or 3 days ago but has gradually gotten worse. Culminated today with him not being able to work for more than an hour or so because he does started to feel very off. On his way home he noticed that he was even having a difficult time driving and felt like he needed to pull over. When he got home he was confused and altered enough that his family called EMS. The fire department arrival his blood sugar was 89 so he did receive some oral glucose tablets. Patient says that that did not really change how he feels. He says that for the last 3 days other than feeling a little confused and altered he had no fevers or chills, no headache, no cough or shortness of breath, no nausea, vomiting, or diarrhea. Nothing like this is ever happened to him before. He says he does not smoke, drink, or do drugs. No other associated symptoms. Elements of this note were created using speech recognition software. As such, errors of speech recognition may be present. Review of Systems   Constitutional:  Negative for activity change, chills and fever. HENT:  Negative for congestion and sore throat. Eyes:  Negative for redness and visual disturbance. Respiratory:  Negative for cough, shortness of breath and wheezing. Cardiovascular:  Negative for chest pain and palpitations. Gastrointestinal:  Negative for abdominal pain, diarrhea, nausea and vomiting. Endocrine: Negative for polydipsia and polyuria. Genitourinary:  Negative for flank pain and hematuria. Musculoskeletal:  Negative for joint swelling and myalgias. Skin:  Negative for color change and rash. Allergic/Immunologic: Negative for immunocompromised state. Neurological:  Negative for dizziness, light-headedness and headaches. Hematological:  Negative for adenopathy. Psychiatric/Behavioral:  Positive for confusion.       Past Medical History:   Diagnosis Date    Cancer ([facility]     skin CA    Diabetes ([facility] 2017    type 2; metformin - fbs 130- A1C 5.5 (8/2022)-- denies hypoglycemic episodes    Esophageal varices ([facility]     controlled with med    GERD (gastroesophageal reflux disease)     nexium daily,well controlled    Hearing reduced     Hoarseness     Hypertension     managed with meds    Laryngopharyngeal reflux     nexium daily,well controlled    Liver disease     \"fatty liver-\"elevated enzymes: monitored by GI and PCP    Mild depression 09/18/2018    managed with meds    Morbid obesity ([facility]     BMI- 39    CANNON (nonalcoholic steatohepatitis)     monitored by GI    Obstructive sleep apnea on CPAP 7/18/2016    Skin cancer     non-melanoma    Thrombocytopenia ([facility]     2018 Dr Greene Medico -        Past Surgical History:   Procedure Laterality Date    COLONOSCOPY N/A 8/30/2018    COLONOSCOPY/ 40 performed by Hardik Johnson MD at [facility] N/A 8/19/2021    COLONOSCOPY/ 41 performed by Delmy De MD at [facility] ENDOSCOPY    ESOPHAGOGASTRODUODENOSCOPY      multiple    KNEE SURGERY Left 03/01/2019    NASAL SEPTUM SURGERY      ORTHOPEDIC SURGERY Left 2017    wrist fx    ORTHOPEDIC SURGERY Right 2008    wrist fx    SINUS SURGERY      UPPER GASTROINTESTINAL ENDOSCOPY N/A 10/26/2022    EGD CONTROL HEMORRHAGE performed by Abhishek Jose MD at [facility] ENDOSCOPY        Family History   Problem Relation Age of Onset    Hypertension Father     Dementia Father     Diabetes Father     Cancer Mother         Social History     Socioeconomic History    Marital status:    Tobacco Use    Smoking status: Never    Smokeless tobacco: Never   Vaping Use    Vaping Use: Never used   Substance and Sexual Activity    Alcohol use: No    Drug use: No     Social Determinants of Health     Financial Resource Strain: Unknown    Difficulty of Paying Living Expenses: Patient refused   Food Insecurity: Unknown    Worried About Running Out of Food in the Last Year: Patient refused    [address] in the Last Year: Patient refused   Transportation Needs: Unknown    Lack of Transportation (Non-Medical): Patient refused   Housing Stability: Unknown    Unstable Housing in the Last Year: Patient refused         Patient has no known allergies. Previous Medications    ACETAMINOPHEN (TYLENOL) 500 MG TABLET    Take 500 mg by mouth every 6 hours as needed for Pain    ATORVASTATIN (LIPITOR) 10 MG TABLET    TAKE 1 TABLET BY MOUTH EVERY DAY    BLOOD GLUCOSE MONITORING SUPPL (RELION PRIME MONITOR) LES    by Does not apply route    BLOOD GLUCOSE TEST STRIPS (RELION PREMIER TEST) STRIP    Testing daily as directed    CPAP MACHINE MISC    by Does not apply route at bedtime    DIPHENHYDRAMINE (BENADRYL ALLERGY) 25 MG CAPSULE    Take 50 mg by mouth nightly as needed for Itching    ESOMEPRAZOLE MAGNESIUM (NEXIUM) 40 MG PACK    Take 40 mg by mouth daily    FLUTICASONE (FLONASE) 50 MCG/ACT NASAL SPRAY    2 sprays by Nasal route daily    LISINOPRIL (PRINIVIL;ZESTRIL) 10 MG TABLET    Take 1 tablet by mouth daily    MELATONIN PO    Take by mouth nightly as needed Dosage unknown    METFORMIN (GLUCOPHAGE-XR) 500 MG EXTENDED RELEASE TABLET    Take 1 tablet by mouth daily TAKE 1 TABLET BY MOUTH EVERY DAY WITH DINNER    MIRTAZAPINE (REMERON) 15 MG TABLET    Take 1 tablet by mouth nightly    NADOLOL (CORGARD) 40 MG TABLET    Take 40 mg by mouth nightly    SILDENAFIL (VIAGRA) 50 MG TABLET    Take 50 mg by mouth as needed    VITAMIN B-12 (CYANOCOBALAMIN) 1000 MCG TABLET    Take 1,000 mcg by mouth daily        Vitals signs and nursing note reviewed. Patient Vitals for the past 4 hrs:   Temp Pulse Resp BP SpO2   02/25/23 1829 -- 79 20 (!) 140/73 99 %   02/25/23 1621 98.1 °F (36.7 °C) 76 12 (!) 149/80 97 %          Physical Exam  Vitals and nursing note reviewed. Constitutional:       Appearance: Normal appearance.    HENT: Head: Normocephalic and atraumatic. Cardiovascular:      Rate and Rhythm: Normal rate and regular rhythm. Pulmonary:      Effort: Pulmonary effort is normal.      Breath sounds: Normal breath sounds. Abdominal:      General: Bowel sounds are normal.      Palpations: Abdomen is soft. Neurological:      General: No focal deficit present. Mental Status: He is alert and oriented to person, place, and time. Cranial Nerves: No cranial nerve deficit. Motor: No weakness. Comments: He has a little bit of a tremor in his arms when raise in front of him but no drift. He is little unsteady when he walks but no significant ataxia. Procedures    Results for orders placed or performed during the hospital encounter of 02/25/23   COVID-19, Rapid    Specimen: Nasopharyngeal   Result Value Ref Range    Source NASAL      SARS-CoV-2, Rapid Not detected NOTD     CT HEAD WO CONTRAST    Narrative    EXAMINATION: CT HEAD WO CONTRAST    DATE: 2/25/2023 4:30 PM     INDICATION: Altered mental status. COMPARISON: None available. TECHNIQUE: Thin section noncontrast axial images were obtained through the head. Coronal reformatted images were created. CT dose lowering techniques were   used, to include: automated exposure control, adjustment for patient size, and   or use of iterative reconstruction    FINDINGS:    Bones and extracranial soft tissues:    Calvarium is intact. Minimal scattered ethmoidal mucosal thickening. No focal   paranasal sinus air-fluid levels identified. The imaged mastoid air cells and   middle ear cavities are essentially clear. Slightly S-shaped nasal septal   configuration. Included globes and orbits are unremarkable. Intracranial contents:    Mild generalized parenchymal volume loss. Mild-moderate foci of white matter   hypoattenuation involving the subcortical and periventricular white matter,   nonspecific. Basal cisterns are patent.  No hemorrhage, extra-axial collection,   or hydrocephalus. No findings of acute large vessel territory infarction. No   noncontrast CT findings of a discrete focal suspicious intracranial mass or   acute intracranial mass effect. Impression    1. No findings of acute intracranial hemorrhage, acute large vessel territory   infarction, or acute intracranial mass effect. If the etiology of the patient's   reported altered mental status remains unclear; consider dedicated MRI brain   examination if clinically feasible for further assessment. 2.  Mild generalized parenchymal volume loss. Mild-moderate white matter   disease, nonspecific, but most commonly attributed to mild-moderate chronic   small vessel ischemic changes in a patient of this age. 3.  Incidental findings, as detailed. Gilbert Dolan MD  Neuroradiologist  [facility]  [email or web address]      Thank you for this referral. This exam was interpreted by a fellowship trained   neuroradiologist. If the patient's healthcare provider has any questions, a   [facility] neuroradiologist can be reached directly at [phone] at any   time.     SLOT: 78     [facility]   2/25/2023 5:00:00 PM   CBC with Auto Differential   Result Value Ref Range    WBC 4.0 (L) 4.3 - 11.1 K/uL    RBC 3.76 (L) 4.23 - 5.6 M/uL    Hemoglobin 11.2 (L) 13.6 - 17.2 g/dL    Hematocrit 34.1 (L) 41.1 - 50.3 %    MCV 90.7 82 - 102 FL    MCH 29.8 26.1 - 32.9 PG    MCHC 32.8 31.4 - 35.0 g/dL    RDW 16.1 (H) 11.9 - 14.6 %    Platelets 75 (L) 200 - 450 K/uL    MPV 11.0 9.4 - 12.3 FL    nRBC 0.00 0.0 - 0.2 K/uL    Differential Type AUTOMATED      Seg Neutrophils 69 43 - 78 %    Lymphocytes 16 13 - 44 %    Monocytes 10 4.0 - 12.0 %    Eosinophils % 4 0.5 - 7.8 %    Basophils 1 0.0 - 2.0 %    Immature Granulocytes 0 0.0 - 5.0 %    Segs Absolute 2.8 1.7 - 8.2 K/UL    Absolute Lymph # 0.7 0.5 - 4.6 K/UL    Absolute Mono # 0.4 0.1 - 1.3 K/UL    Absolute Eos # 0.2 0.0 - 0.8 K/UL    Basophils Absolute 0.0 0.0 - 0.2 K/UL    Absolute Immature Granulocyte 0.0 0.0 - 0.5 K/UL   Comprehensive Metabolic Panel   Result Value Ref Range    Sodium 142 133 - 143 mmol/L    Potassium 4.6 3.5 - 5.1 mmol/L    Chloride 116 (H) 101 - 110 mmol/L    CO2 23 21 - 32 mmol/L    Anion Gap 3 2 - 11 mmol/L    Glucose 125 (H) 65 - 100 mg/dL    BUN 12 6 - 23 MG/DL    Creatinine 0.70 (L) 0.8 - 1.5 MG/DL    Est, Glom Filt Rate >60 >60 ml/min/1.73m2    Calcium 8.7 8.3 - 10.4 MG/DL    Total Bilirubin 2.0 (H) 0.2 - 1.1 MG/DL    ALT 52 12 - 65 U/L    AST 49 (H) 15 - 37 U/L    Alk Phosphatase 59 50 - 136 U/L    Total Protein 6.1 (L) 6.3 - 8.2 g/dL    Albumin 3.0 (L) 3.5 - 5.0 g/dL    Globulin 3.1 2.8 - 4.5 g/dL    Albumin/Globulin Ratio 1.0 0.4 - 1.6     Lipase   Result Value Ref Range    Lipase 176 73 - 393 U/L   Magnesium   Result Value Ref Range    Magnesium 1.8 1.8 - 2.4 mg/dL   TSH   Result Value Ref Range    TSH, 3RD GENERATION 1.090 0.358 - 3.740 uIU/mL   Lactic Acid   Result Value Ref Range    Lactic Acid, Plasma 1.1 0.4 - 2.0 MMOL/L   Protime-INR   Result Value Ref Range    Protime 16.0 (H) 12.6 - 14.3 sec    INR 1.2     APTT   Result Value Ref Range    PTT 30.4 24.5 - 34.2 SEC   Ammonia   Result Value Ref Range    Ammonia 32 11 - 32 UMOL/L   POCT Glucose   Result Value Ref Range    POC Glucose 115 (H) 65 - 100 mg/dL    Performed by: Beryl    POCT Glucose   Result Value Ref Range    POC Glucose 105 (H) 65 - 100 mg/dL    Performed by: Ronda Cohen         CT HEAD WO CONTRAST   Final Result      1. No findings of acute intracranial hemorrhage, acute large vessel territory    infarction, or acute intracranial mass effect. If the etiology of the patient's    reported altered mental status remains unclear; consider dedicated MRI brain    examination if clinically feasible for further assessment. 2.  Mild generalized parenchymal volume loss.  Mild-moderate white matter    disease, nonspecific, but most commonly attributed to mild-moderate chronic    small vessel ischemic changes in a patient of this age. 3.  Incidental findings, as detailed. Kentrell Rivera MD   Neuroradiologist   [facility]    [employer]         Thank you for this referral. This exam was interpreted by a fellowship trained    neuroradiologist. If the patient's healthcare provider has any questions, a    [facility] neur[facility]ologist can be reached directly at [phone] at any    time. SLOT: 78       [facility]    2/25/2023 5:00:00 PM      CTA head neck with contrast    (Results Pending)   MRI brain without contrast    (Results Pending)                       Voice dictation software was used during the making of this note. This software is not perfect and grammatical and other typographical errors may be present. This note has not been completely proofread for errors.         Weston Tejada MD  02/25/23 5467

## 2023-02-26 ENCOUNTER — APPOINTMENT (OUTPATIENT)
Dept: MRI IMAGING | Age: 58
DRG: 065 | End: 2023-02-26
Payer: COMMERCIAL

## 2023-02-26 LAB
ANION GAP SERPL CALC-SCNC: 4 MMOL/L (ref 2–11)
BUN SERPL-MCNC: 12 MG/DL (ref 6–23)
CALCIUM SERPL-MCNC: 8.5 MG/DL (ref 8.3–10.4)
CHLORIDE SERPL-SCNC: 116 MMOL/L (ref 101–110)
CHOLEST SERPL-MCNC: 123 MG/DL
CO2 SERPL-SCNC: 23 MMOL/L (ref 21–32)
CREAT SERPL-MCNC: 0.6 MG/DL (ref 0.8–1.5)
ERYTHROCYTE [DISTWIDTH] IN BLOOD BY AUTOMATED COUNT: 16.3 % (ref 11.9–14.6)
EST. AVERAGE GLUCOSE BLD GHB EST-MCNC: 117 MG/DL
GLUCOSE BLD STRIP.AUTO-MCNC: 115 MG/DL (ref 65–100)
GLUCOSE BLD STRIP.AUTO-MCNC: 121 MG/DL (ref 65–100)
GLUCOSE BLD STRIP.AUTO-MCNC: 130 MG/DL (ref 65–100)
GLUCOSE BLD STRIP.AUTO-MCNC: 136 MG/DL (ref 65–100)
GLUCOSE SERPL-MCNC: 120 MG/DL (ref 65–100)
HBA1C MFR BLD: 5.7 % (ref 4.8–5.6)
HCT VFR BLD AUTO: 33.3 % (ref 41.1–50.3)
HDLC SERPL-MCNC: 57 MG/DL (ref 40–60)
HDLC SERPL: 2.2
HGB BLD-MCNC: 10.9 G/DL (ref 13.6–17.2)
LDLC SERPL CALC-MCNC: 43.2 MG/DL
MCH RBC QN AUTO: 29.9 PG (ref 26.1–32.9)
MCHC RBC AUTO-ENTMCNC: 32.7 G/DL (ref 31.4–35)
MCV RBC AUTO: 91.2 FL (ref 82–102)
NRBC # BLD: 0 K/UL (ref 0–0.2)
PLATELET # BLD AUTO: 68 K/UL (ref 150–450)
PMV BLD AUTO: 11.1 FL (ref 9.4–12.3)
POTASSIUM SERPL-SCNC: 3.6 MMOL/L (ref 3.5–5.1)
RBC # BLD AUTO: 3.65 M/UL (ref 4.23–5.6)
SERVICE CMNT-IMP: ABNORMAL
SODIUM SERPL-SCNC: 143 MMOL/L (ref 133–143)
TRIGL SERPL-MCNC: 114 MG/DL (ref 35–150)
VLDLC SERPL CALC-MCNC: 22.8 MG/DL (ref 6–23)
WBC # BLD AUTO: 3.1 K/UL (ref 4.3–11.1)

## 2023-02-26 PROCEDURE — 6370000000 HC RX 637 (ALT 250 FOR IP): Performed by: FAMILY MEDICINE

## 2023-02-26 PROCEDURE — 94762 N-INVAS EAR/PLS OXIMTRY CONT: CPT

## 2023-02-26 PROCEDURE — 82962 GLUCOSE BLOOD TEST: CPT

## 2023-02-26 PROCEDURE — 70551 MRI BRAIN STEM W/O DYE: CPT

## 2023-02-26 PROCEDURE — 97161 PT EVAL LOW COMPLEX 20 MIN: CPT

## 2023-02-26 PROCEDURE — 6370000000 HC RX 637 (ALT 250 FOR IP): Performed by: NURSE PRACTITIONER

## 2023-02-26 PROCEDURE — 80061 LIPID PANEL: CPT

## 2023-02-26 PROCEDURE — 97535 SELF CARE MNGMENT TRAINING: CPT

## 2023-02-26 PROCEDURE — 85027 COMPLETE CBC AUTOMATED: CPT

## 2023-02-26 PROCEDURE — 97530 THERAPEUTIC ACTIVITIES: CPT

## 2023-02-26 PROCEDURE — 1100000003 HC PRIVATE W/ TELEMETRY

## 2023-02-26 PROCEDURE — 97165 OT EVAL LOW COMPLEX 30 MIN: CPT

## 2023-02-26 PROCEDURE — 80048 BASIC METABOLIC PNL TOTAL CA: CPT

## 2023-02-26 PROCEDURE — 36415 COLL VENOUS BLD VENIPUNCTURE: CPT

## 2023-02-26 PROCEDURE — 92610 EVALUATE SWALLOWING FUNCTION: CPT

## 2023-02-26 PROCEDURE — 83036 HEMOGLOBIN GLYCOSYLATED A1C: CPT

## 2023-02-26 RX ORDER — IBUPROFEN 400 MG/1
600 TABLET ORAL ONCE
Status: COMPLETED | OUTPATIENT
Start: 2023-02-26 | End: 2023-02-26

## 2023-02-26 RX ADMIN — PANTOPRAZOLE SODIUM 40 MG: 40 TABLET, DELAYED RELEASE ORAL at 05:55

## 2023-02-26 RX ADMIN — ATORVASTATIN CALCIUM 40 MG: 40 TABLET, FILM COATED ORAL at 09:16

## 2023-02-26 RX ADMIN — ASPIRIN 81 MG: 81 TABLET ORAL at 09:16

## 2023-02-26 RX ADMIN — LISINOPRIL 10 MG: 5 TABLET ORAL at 09:16

## 2023-02-26 RX ADMIN — IBUPROFEN 600 MG: 400 TABLET, FILM COATED ORAL at 17:25

## 2023-02-26 NOTE — ED NOTES
TRANSFER - OUT REPORT:    Verbal report given to Giselle Lara RN on Rustam Phillips.  being transferred to [id number] for routine progression of patient care       Report consisted of patient's Situation, Background, Assessment and   Recommendations(SBAR). Information from the following report(s) Nurse Handoff Report, ED Encounter Summary, ED SBAR, MAR, and Neuro Assessment was reviewed with the receiving nurse. [place] Assessment: No data recorded  Lines:   Peripheral IV 02/25/23 Right Antecubital (Active)        Opportunity for questions and clarification was provided.       Patient transported with:  Registered Nurse          Sami aPlmer RN  02/25/23 2008

## 2023-02-26 NOTE — ACP (ADVANCE CARE PLANNING)
[facility] Hospitalist Service  At the heart of better care     Advance Care Planning   Admit Date:  2023  4:14 PM   Name:  Claudia Henry. Age:  65 y.o. Sex:  male  :  1965   MRN:  [id number]   Room:  [facility]. is able to make his own decisions:   Yes    Patient / surrogate decision-maker directed code status:  FULL    Patient or surrogate consented to discussion of the current conditions, workup, management plans, prognosis, and the risk for further deterioration. Time spent: 17 minutes in direct discussion.       Signed:  Kathe Carlisle DO

## 2023-02-26 NOTE — PROGRESS NOTES
Hospitalist Progress Note   Admit Date:  2023  4:14 PM   Name:  Artis Retana. Age:  65 y.o. Sex:  male  :  1965   MRN:  [id number]   Room:  [facility]    Presenting Complaint: No chief complaint on file. Reason(s) for Admission: Transient alteration of awareness [R40.4]  Dysarthria [R47.1]     Hospital Course:   Mr. Luann Moon is a 65 y.o. male w/ a PMH of DM type II, GERD, CANNON with gastric / esophageal varices, depression, leukopenia, thrombocytopenia, insomnia, and MORGAN on CPAP who presented to the ER due to difficulty with speech and AMS that has been waxing and waning since last night. Wife noted slurred speech this AM along with difficulty using his right hand when trying to eat his breakfast.  Now, back to baseline per the family. TSH and ammonia normal.  Labs near his baseline (chronically has leukopenia and thrombocytopenia). Family states he was recently placed on Remeron last week to help with insomnia. CT head was non acute. CTA head / neck w/ no occlusion or stenosis. He was admitted to the Hospitalist service for further work up. Subjective & 24hr Events (23): The patient was seen with family at bedside. His neuro exam is normal.  Spouse stated she has noticed him snoring with CPAP lately. The patient acknowledged that he has not been sleeping well and this is why Remeron was started. He stated that he used to sleep very well with his CPAP but lately that is not the case. He was instructed to follow-up with his provider at discharge to have this investigated further. Preliminary MRI brain read this afternoon cannot exclude a possible tiny lacunar infarct to his left temporal lobe. Awaiting TTE. Assessment & Plan:     Dysarthria and confusion   Possible tiny lacune to L temporal lobe  - CVA work up. Increase statin dose. ASA.    - Stop Remeron since some side effects from remeron can be confusion, difficulty walking, etc.   - CT and CTA negative  - Prelim MRI brain cannot exclude a tiny lacunar infarct to L temporal lobe  - Follow up on final read  - PT/OT stated no further skilled therapy necessary  - Awaiting TTE     Insomina   - PRN melatonin     DM type II   - correctional insulin     HTN   - permissive HTN for 24 hours  - ACEi and BB at home     CANNON with gastric/esophageal varices   - Can restart nadalol on Feb/27    Thrombocytopenia, chronic  - r/t hepatic issues   - platelets 68  - he has been told to not take ASA  - hold ASA for now, reevaluate tomorrow    MORGAN  - CPAP  - overnight oximetry with RT tonight    Obesity, class 3  - increases overall morbidity and mortality      PT/OT evals and PPD needed/ordered?  Yes  Diet:  ADULT DIET; Regular; 4 carb choices (60 gm/meal); Low Fat/Low Chol/High Fiber/2 gm Na  VTE prophylaxis: SCD's   Code status: Full Code    Hospital Problems:  Principal Problem:    Dysarthria  Active Problems:    Obstructive sleep apnea on CPAP    Controlled type 2 diabetes mellitus without complication, without long-term current use of insulin (HCC)    Thrombocytopenia (HCC)    Liver cirrhosis secondary to CANNON (nonalcoholic steatohepatitis) (HCC)    Esophageal and gastric varices (HCC)    Gastroesophageal reflux disease without esophagitis    Hypercholesterolemia    Essential hypertension  Resolved Problems:    * No resolved hospital problems. *      Objective:   Patient Vitals for the past 24 hrs:   Temp Pulse Resp BP SpO2   02/26/23 1510 98.6 °F (37 °C) 86 18 135/68 98 %   02/26/23 1200 98.6 °F (37 °C) 83 18 (!) 143/76 100 %   02/26/23 0800 99.1 °F (37.3 °C) 86 18 (!) 146/78 99 %   02/26/23 0400 98.1 °F (36.7 °C) 78 18 134/67 99 %   02/26/23 0000 97.5 °F (36.4 °C) 75 18 138/68 98 %   02/25/23 2038 98.1 °F (36.7 °C) 78 18 (!) 146/78 99 %   02/25/23 1829 -- 79 20 (!) 140/73 99 %   02/25/23 1621 98.1 °F (36.7 °C) 76 12 (!) 149/80 97 %       Oxygen Therapy  SpO2: 98 %  O2 Device: None (Room air)  Skin Assessment: Clean, dry, &  intact    Estimated body mass index is 40.45 kg/m² as calculated from the following:    Height as of this encounter: 5' 11\" (1.803 m). Weight as of this encounter: 290 lb (131.5 kg). Intake/Output Summary (Last 24 hours) at 2/26/2023 1557  Last data filed at 2/26/2023 1407  Gross per 24 hour   Intake 458 ml   Output --   Net 458 ml         Physical Exam:   Blood pressure 135/68, pulse 86, temperature 98.6 °F (37 °C), temperature source Oral, resp. rate 18, height 5' 11\" (1.803 m), weight 290 lb (131.5 kg), SpO2 98 %. General:    NAD  Head:  Normocephalic, atraumatic  Eyes:  Sclerae appear normal.  Pupils equally round. ENT:  Nares appear normal.  Moist oral mucosa  Neck:  No restricted ROM. Trachea midline   CV:   RRR. No m/r/g. Lungs: No wheezing. Symmetric expansion. Unlabored on room air. Abdomen:   Soft, nondistended. Extremities: No cyanosis or clubbing. Skin:     No rashes and normal coloration. Neuro:  CN II-XII grossly intact.     Psych:  Pleasant, compliant      I have personally reviewed labs and tests:  Recent Labs:  Recent Results (from the past 48 hour(s))   POCT Glucose    Collection Time: 02/25/23  4:17 PM   Result Value Ref Range    POC Glucose 115 (H) 65 - 100 mg/dL    Performed by: Beryl    CBC with Auto Differential    Collection Time: 02/25/23  4:27 PM   Result Value Ref Range    WBC 4.0 (L) 4.3 - 11.1 K/uL    RBC 3.76 (L) 4.23 - 5.6 M/uL    Hemoglobin 11.2 (L) 13.6 - 17.2 g/dL    Hematocrit 34.1 (L) 41.1 - 50.3 %    MCV 90.7 82 - 102 FL    MCH 29.8 26.1 - 32.9 PG    MCHC 32.8 31.4 - 35.0 g/dL    RDW 16.1 (H) 11.9 - 14.6 %    Platelets 75 (L) 650 - 450 K/uL    MPV 11.0 9.4 - 12.3 FL    nRBC 0.00 0.0 - 0.2 K/uL    Differential Type AUTOMATED      Seg Neutrophils 69 43 - 78 %    Lymphocytes 16 13 - 44 %    Monocytes 10 4.0 - 12.0 %    Eosinophils % 4 0.5 - 7.8 %    Basophils 1 0.0 - 2.0 %    Immature Granulocytes 0 0.0 - 5.0 %    Segs Absolute 2.8 1.7 - 8.2 K/UL Absolute Lymph # 0.7 0.5 - 4.6 K/UL    Absolute Mono # 0.4 0.1 - 1.3 K/UL    Absolute Eos # 0.2 0.0 - 0.8 K/UL    Basophils Absolute 0.0 0.0 - 0.2 K/UL    Absolute Immature Granulocyte 0.0 0.0 - 0.5 K/UL   Comprehensive Metabolic Panel    Collection Time: 02/25/23  4:27 PM   Result Value Ref Range    Sodium 142 133 - 143 mmol/L    Potassium 4.6 3.5 - 5.1 mmol/L    Chloride 116 (H) 101 - 110 mmol/L    CO2 23 21 - 32 mmol/L    Anion Gap 3 2 - 11 mmol/L    Glucose 125 (H) 65 - 100 mg/dL    BUN 12 6 - 23 MG/DL    Creatinine 0.70 (L) 0.8 - 1.5 MG/DL    Est, Glom Filt Rate >60 >60 ml/min/1.73m2    Calcium 8.7 8.3 - 10.4 MG/DL    Total Bilirubin 2.0 (H) 0.2 - 1.1 MG/DL    ALT 52 12 - 65 U/L    AST 49 (H) 15 - 37 U/L    Alk Phosphatase 59 50 - 136 U/L    Total Protein 6.1 (L) 6.3 - 8.2 g/dL    Albumin 3.0 (L) 3.5 - 5.0 g/dL    Globulin 3.1 2.8 - 4.5 g/dL    Albumin/Globulin Ratio 1.0 0.4 - 1.6     Lipase    Collection Time: 02/25/23  4:27 PM   Result Value Ref Range    Lipase 176 73 - 393 U/L   Magnesium    Collection Time: 02/25/23  4:27 PM   Result Value Ref Range    Magnesium 1.8 1.8 - 2.4 mg/dL   COVID-19, Rapid    Collection Time: 02/25/23  4:27 PM    Specimen: Nasopharyngeal   Result Value Ref Range    Source NASAL      SARS-CoV-2, Rapid Not detected NOTD     TSH    Collection Time: 02/25/23  4:27 PM   Result Value Ref Range    TSH, 3RD GENERATION 1.090 0.358 - 3.740 uIU/mL   Lactic Acid    Collection Time: 02/25/23  4:27 PM   Result Value Ref Range    Lactic Acid, Plasma 1.1 0.4 - 2.0 MMOL/L   Protime-INR    Collection Time: 02/25/23  4:27 PM   Result Value Ref Range    Protime 16.0 (H) 12.6 - 14.3 sec    INR 1.2     APTT    Collection Time: 02/25/23  4:27 PM   Result Value Ref Range    PTT 30.4 24.5 - 34.2 SEC   POCT Glucose    Collection Time: 02/25/23  6:12 PM   Result Value Ref Range    POC Glucose 105 (H) 65 - 100 mg/dL    Performed by: Ronda Cohen    Ammonia    Collection Time: 02/25/23  6:22 PM Result Value Ref Range    Ammonia 32 11 - 32 UMOL/L   Urinalysis    Collection Time: 02/25/23  7:23 PM   Result Value Ref Range    Color, UA YELLOW/STRAW      Appearance CLEAR      Specific Gravity, UA 1.006 1.001 - 1.023      pH, Urine 6.0 5.0 - 9.0      Protein, UA Negative NEG mg/dL    Glucose, UA Negative mg/dL    Ketones, Urine Negative NEG mg/dL    Bilirubin Urine Negative NEG      Blood, Urine TRACE (A) NEG      Urobilinogen, Urine 0.2 0.2 - 1.0 EU/dL    Nitrite, Urine Negative NEG      Leukocyte Esterase, Urine Negative NEG      WBC, UA 0-4 U4 /hpf    RBC, UA 0-5 U5 /hpf    Epithelial Cells UA 0-5 U5 /hpf    BACTERIA, URINE Negative NEG /hpf    Casts 0-2 U2 /lpf   POCT Glucose    Collection Time: 02/25/23  8:40 PM   Result Value Ref Range    POC Glucose 122 (H) 65 - 100 mg/dL    Performed by: Boostable    Basic Metabolic Panel w/ Reflex to MG    Collection Time: 02/26/23  4:00 AM   Result Value Ref Range    Sodium 143 133 - 143 mmol/L    Potassium 3.6 3.5 - 5.1 mmol/L    Chloride 116 (H) 101 - 110 mmol/L    CO2 23 21 - 32 mmol/L    Anion Gap 4 2 - 11 mmol/L    Glucose 120 (H) 65 - 100 mg/dL    BUN 12 6 - 23 MG/DL    Creatinine 0.60 (L) 0.8 - 1.5 MG/DL    Est, Glom Filt Rate >60 >60 ml/min/1.73m2    Calcium 8.5 8.3 - 10.4 MG/DL   CBC    Collection Time: 02/26/23  4:00 AM   Result Value Ref Range    WBC 3.1 (L) 4.3 - 11.1 K/uL    RBC 3.65 (L) 4.23 - 5.6 M/uL    Hemoglobin 10.9 (L) 13.6 - 17.2 g/dL    Hematocrit 33.3 (L) 41.1 - 50.3 %    MCV 91.2 82 - 102 FL    MCH 29.9 26.1 - 32.9 PG    MCHC 32.7 31.4 - 35.0 g/dL    RDW 16.3 (H) 11.9 - 14.6 %    Platelets 68 (L) 167 - 450 K/uL    MPV 11.1 9.4 - 12.3 FL    nRBC 0.00 0.0 - 0.2 K/uL   Hemoglobin A1c    Collection Time: 02/26/23  4:00 AM   Result Value Ref Range    Hemoglobin A1C 5.7 (H) 4.8 - 5.6 %    eAG 117 mg/dL   Lipid Panel    Collection Time: 02/26/23  4:00 AM   Result Value Ref Range    Cholesterol, Total 123 <200 MG/DL    Triglycerides 114 35 - 150 MG/DL    HDL 57 40 - 60 MG/DL    LDL Calculated 43.2 <100 MG/DL    VLDL Cholesterol Calculated 22.8 6.0 - 23.0 MG/DL    Chol/HDL Ratio 2.2     POCT Glucose    Collection Time: 02/26/23  6:40 AM   Result Value Ref Range    POC Glucose 121 (H) 65 - 100 mg/dL    Performed by: Lázaro    POCT Glucose    Collection Time: 02/26/23 11:30 AM   Result Value Ref Range    POC Glucose 136 (H) 65 - 100 mg/dL    Performed by: Melissa    POCT Glucose    Collection Time: 02/26/23  3:12 PM   Result Value Ref Range    POC Glucose 130 (H) 65 - 100 mg/dL    Performed by: Melissa        I have personally reviewed imaging studies:  Other Studies:  MRI brain without contrast         CTA head neck with contrast   Final Result      Normal CTA head and neck.             Thank you for this referral.       Byron Huynh M.D.   Neuroradiologist   [facility]   [email or web address]            SLOT: 66         yBron Huynh M.D.    2/25/2023 8:21:00 PM      CT HEAD WO CONTRAST   Final Result      1.  No findings of acute intracranial hemorrhage, acute large vessel territory    infarction, or acute intracranial mass effect. If the etiology of the patient's    reported altered mental status remains unclear; consider dedicated MRI brain    examination if clinically feasible for further assessment.      2.  Mild generalized parenchymal volume loss. Mild-moderate white matter    disease, nonspecific, but most commonly attributed to mild-moderate chronic    small vessel ischemic changes in a patient of this age.      3.  Incidental findings, as detailed.            Argelia Lam MD   Neuroradiologist   [facility]   [email or web address]         Thank you for this referral. This exam was interpreted by a fellowship trained    neuroradiologist. If the patient's healthcare provider has any questions, a    [facility] neuroradiologist can be reached directly at [phone] at any     time.      SLOT: 79       [facility]    2/25/2023 5:00:00 PM          Current Meds:  Current Facility-Administered Medications   Medication Dose Route Frequency    atorvastatin (LIPITOR) tablet 40 mg  40 mg Oral QAM    pantoprazole (PROTONIX) tablet 40 mg  40 mg Oral QAM AC    fluticasone (FLONASE) 50 MCG/ACT nasal spray 2 spray  2 spray Nasal Daily    lisinopril (PRINIVIL;ZESTRIL) tablet 10 mg  10 mg Oral Daily    melatonin tablet 3 mg  3 mg Oral Nightly PRN    insulin lispro (HUMALOG) injection vial 0-8 Units  0-8 Units SubCUTAneous TID WC    insulin lispro (HUMALOG) injection vial 0-4 Units  0-4 Units SubCUTAneous Nightly    glucose chewable tablet 16 g  4 tablet Oral PRN    dextrose bolus 10% 125 mL  125 mL IntraVENous PRN    Or    dextrose bolus 10% 250 mL  250 mL IntraVENous PRN    glucagon (rDNA) injection 1 mg  1 mg SubCUTAneous PRN    dextrose 10 % infusion   IntraVENous Continuous PRN    nicotine (NICODERM CQ) 14 MG/24HR 1 patch  1 patch TransDERmal Daily PRN    acetaminophen (TYLENOL) tablet 650 mg  650 mg Oral Q4H PRN    Or    acetaminophen (TYLENOL) suppository 650 mg  650 mg Rectal Q4H PRN    polyethylene glycol (GLYCOLAX) packet 17 g  17 g Oral Daily PRN    bisacodyl (DULCOLAX) suppository 10 mg  10 mg Rectal Daily PRN    aspirin EC tablet 81 mg  81 mg Oral Daily    labetalol (NORMODYNE;TRANDATE) injection 10 mg  10 mg IntraVENous Q10 Min PRN       Signed:  LILLIANA Og - CNP    Part of this note may have been written by using a voice dictation software.  The note has been proof read but may still contain some grammatical/other typographical errors.

## 2023-02-26 NOTE — PROGRESS NOTES
ACUTE OCCUPATIONAL THERAPY GOALS:   (Developed with and agreed upon by patient and/or caregiver.)  No goals, evaluation and d/c. Pt at functional baseline. OCCUPATIONAL THERAPY Initial Assessment, Daily Note, and Discharge          Acknowledge Orders  Time  OT Charge Capture  Rehab Caseload Tracker      Jacqueline Newman is a 65 y.o. male   PRIMARY DIAGNOSIS: Dysarthria  Transient alteration of awareness [R40.4]  Dysarthria [R47.1]       Reason for Referral: Generalized Muscle Weakness (M62.81)  Difficulty in walking, Not elsewhere classified (R26.2)  Inpatient: Payor: Chayo Garcia / Plan: Kristyn Ha / Product Type: *No Product type* /     ASSESSMENT:     REHAB RECOMMENDATIONS:   Recommendation to date pending progress:  Setting:  No further skilled therapy after discharge from hospital    Equipment:    None     ASSESSMENT:  Mr. Sapna Chauhan is a 64 y/o male presents with dysarthria and AMS that have since resolved. At baseline pt lives with his wife, is independent all ADLs, works and drives. Today pt overall independent no AD for functional transfers and mobility of household distances. Pt able to complete LE dressing and grooming ADL standing at sink independently and with good balance. Pt reports he has been up ad shayan for ADLs since admission. Pt did not c/o dizziness, lightheadedness and SOB throughout. Pt educated on BE FAST signs/symptoms of stroke, pt verbalized understanding and able to teach back. Pt appears to be functioning at his baseline and does not need further OT services during acute care stay or at d/c. Will d/c from caseload.       [employer] AM-PAC 6 Clicks Daily Activity Inpatient Short Form:    AM-PAC Daily Activity - Inpatient   How much help is needed for putting on and taking off regular lower body clothing?: None  How much help is needed for bathing (which includes washing, rinsing, drying)?: None  How much help is needed for toileting (which includes using toilet, bedpan, or urinal)?: None  How much help is needed for putting on and taking off regular upper body clothing?: None  How much help is needed for taking care of personal grooming?: None  How much help for eating meals?: None  AM-PAC Inpatient Daily Activity Raw Score: 24  AM-PAC Inpatient ADL T-Scale Score : 57.54  ADL Inpatient CMS 0-100% Score: 0  ADL Inpatient CMS G-Code Modifier : CH           SUBJECTIVE:     Mr. Marcia Goel states, \"I just didn't feel right\"     Social/Functional Lives With: Spouse  Type of Home: House  Bathroom Shower/Tub: Walk-in shower  ADL Assistance: Independent  Active : Yes    OBJECTIVE:     Sushila Hopkins / Bean Sanford / Cheryle Palmer: None    RESTRICTIONS/PRECAUTIONS:   NONE    PAIN: VITALS / O2:   Pre Treatment:   Pain Assessment: None - Denies Pain      Post Treatment: no c/o pain, resting comfortably in bedside chair       Vitals          Oxygen            GROSS EVALUATION: INTACT IMPAIRED   (See Comments)   UE AROM [x] []   UE PROM [x] []   Strength [x]       Posture / Balance [x]     Sensation [x]     Coordination [x]       Tone [x]       Edema [x]    Activity Tolerance [x]       Hand Dominance R [] L []      COGNITION/  PERCEPTION: INTACT IMPAIRED   (See Comments)   Orientation [x]     Vision [x]     Hearing [x]     Cognition  [x]     Perception [x]       MOBILITY: I Mod I S SBA CGA Min Mod Max Total  NT x2 Comments:   Bed Mobility    Rolling [x] [] [] [] [] [] [] [] [] [] []    Supine to Sit [x] [] [] [] [] [] [] [] [] [] []    Scooting [x] [] [] [] [] [] [] [] [] [] []    Sit to Supine [] [] [] [] [] [] [] [] [] [x] []    Transfers    Sit to Stand [x] [] [] [] [] [] [] [] [] [] [] No AD   Bed to Chair [x] [] [] [] [] [] [] [] [] [] [] No AD   Stand to Sit [x] [] [] [] [] [] [] [] [] [] [] No AD   Tub/Shower [] [] [] [] [] [] [] [] [] [x] []     Toilet [] [] [] [] [] [] [] [] [] [x] []      [] [] [] [] [] [] [] [] [] [x] []    I=Independent, Mod I=Modified Independent, S=Supervision/Setup, SBA=Standby Assistance, CGA=Contact Guard Assistance, Min=Minimal Assistance, Mod=Moderate Assistance, Max=Maximal Assistance, Total=Total Assistance, NT=Not Tested    ACTIVITIES OF DAILY LIVING: I Mod I S SBA CGA Min Mod Max Total NT Comments: pt reports up ad shayan to restroom   BASIC ADLs:              Upper Body Bathing  [] [] [] [] [] [] [] [] [] [x]    Lower Body Bathing [] [] [] [] [] [] [] [] [] [x]    Toileting [] [] [] [] [] [] [] [] [] [x]    Upper Body Dressing [] [] [] [] [] [] [] [] [] [x]    Lower Body Dressing [x] [] [] [] [] [] [] [] [] [] Donning socks EOB   Feeding [] [] [] [] [] [] [] [] [] [x]    Grooming [x] [] [] [] [] [] [] [] [] [] Washing face standing at sink   Personal Device Care [] [] [] [] [] [] [] [] [] [x]    Functional Mobility [x] [] [] [] [] [] [] [] [] [] No AD   I=Independent, Mod I=Modified Independent, S=Supervision/Setup, SBA=Standby Assistance, CGA=Contact Guard Assistance, Min=Minimal Assistance, Mod=Moderate Assistance, Max=Maximal Assistance, Total=Total Assistance, NT=Not Tested    PLAN:   FREQUENCY/DURATION     for duration of hospital stay or until stated goals are met, whichever comes first.    PROBLEM LIST:   (Skilled intervention is medically necessary to address:)  N/A   INTERVENTIONS PLANNED:  (Benefits and precautions of occupational therapy have been discussed with the patient.)  N/A         TREATMENT:     EVALUATION: LOW COMPLEXITY: (Untimed Charge)    TREATMENT:   Self Care (8 minutes): Patient participated in lower body dressing and grooming ADLs in unsupported sitting and standing with no   cueing to increase independence and decrease assistance required. Patient also participated in functional mobility and functional transfer training to increase independence, decrease assistance required, increase activity tolerance, and increase safety awareness.      TREATMENT GRID:  N/A    AFTER TREATMENT PRECAUTIONS: Call light within reach, Chair, Needs within reach, RN notified, and Visitors at bedside    INTERDISCIPLINARY COLLABORATION:  RN/ PCT, PT/ PTA, and OT/ BUENROSTRO    EDUCATION:  Education Given To: Patient; Family  Education Provided: Role of Therapy;Plan of Care  Education Method: Verbal  Barriers to Learning: None  Education Outcome: Verbalized understanding    TOTAL TREATMENT DURATION AND TIME:  Time In: [address]  Time Out: [address]  Minutes: [address], OT

## 2023-02-26 NOTE — CARE COORDINATION
Pt chart reviewed for discharge planning. LMSW met with pt's spouse at bedside. Pt having MRI. Pt lives with spouse and is normally independent with ADL's. He is employed and insured. PCP identified for follow up care. Pt has been seen by therapies with no recommendations for follow up care. Pt plans to return home with family and has no anticipated supportive care needs. Please re-consult CM if any needs arise. 02/26/23 1317   Service Assessment   Patient Orientation Alert and Oriented   Cognition Alert   History Provided By Medical Record;Spouse   Primary Caregiver Self   Support Systems Spouse/Significant Other   Patient's Healthcare Decision Maker is: Legal Next of Kin   PCP Verified by CM Yes   Last Visit to PCP Within last 3 months   Prior Functional Level Independent in ADLs/IADLs   Current Functional Level Independent in ADLs/IADLs   Can patient return to prior living arrangement Yes   Ability to make needs known: Good   Family able to assist with home care needs: Yes   Would you like for me to discuss the discharge plan with any other family members/significant others, and if so, who? No   Financial Resources Other (Comment)  ([employer])   Social/Functional History   Lives With Spouse   Type of Home House   Bathroom Shower/Tub [id number] [facility] Yes   Discharge Planning   Type of [facility] Spouse/Significant Other   Current Services Prior To Admission C-pap   Potential Assistance Needed N/A   DME Ordered? No   Potential Assistance Purchasing Medications No   Type of Home Care Services None   Patient expects to be discharged to: [address] Discharge   Transition of Care Consult (CM Consult) Discharge [address] Discharge None   [facility] Information Provided?  No   Mode of Transport at Discharge Other (see comment)  (family)   Confirm Follow Up Transport Family   Condition of Participation: Discharge Planning   The Plan for Transition of Care is related to the following treatment goals: Pt will return home with supportive family at his functional baseline. The Patient and/Or Patient Representative agree with the Discharge Plan?  Yes

## 2023-02-26 NOTE — PROGRESS NOTES
SPEECH LANGUAGE PATHOLOGY: DYSPHAGIA  Initial Assessment and Discharge    NAME: Sherry Mejía : 1965  MRN: [id number]    ADMISSION DATE: 2023  PRIMARY DIAGNOSIS: Dysarthria  Transient alteration of awareness [R40.4]  Dysarthria [R47.1]    ICD-10: Treatment Diagnosis: R13.19 Other Dysphagia    RECOMMENDATIONS   Diet:  Diet Solids Recommendation: Regular  Liquid Consistency Recommendation: Thin    Medications: PO              Compensatory Swallowing Strategies:  General - sit upright with PO intake. Patient continues to require skilled intervention: No  D/C Recommendations: No follow up therapy recommended post discharge     ASSESSMENT    Dysphagia Diagnosis: Swallow function appears [facility]  Dysphagia Impression : Oral and pharyngeal phases of swallow were unremarkable. Discussed prior level of function with patient and family related to speech, language, swallowing, voice, and cognition. Per family, patient did exhibit slurred speech and had single instance of word substitution, calling phone a remote, prior to hospitalization. On this date, patient and family report speech and communication are back to baseline. No prior difficulty with swallowing and voice; no deficits noted on this date either. Regarding cognition, patient reports difficulty recalling names of familiar individuals though difficulty has been long standing - family also reports patient recently with changes to medication along with having difficulty sleeping. CT of brain significant for chronic changes which may be related to memory difficulty - encouraged patient to continue working with medical team to optimize health and wellness. Will plan of sign off at this time as patient presenting as baseline per patient and family - if MRI is positive for acute intracranial process, please reconsult speech therapy services.      GENERAL    History of Present Injury/Illness: Mr. Sariah Garner  has a past medical history of Cancer ([facility] [address] Diabetes ([address] Esophageal varices ([address] GERD (gastroesophageal reflux disease), Hearing reduced, Hoarseness, Hypertension, Laryngopharyngeal reflux, Liver disease, Mild depression, Morbid obesity ([address] CANNON (nonalcoholic steatohepatitis), Obstructive sleep apnea on CPAP, Skin cancer, and Thrombocytopenia ([address] . He also  has a past surgical history that includes knee surgery (Left, 03/01/2019); Colonoscopy (N/A, 8/30/2018); orthopedic surgery (Left, 2017); orthopedic surgery (Right, 2008); Colonoscopy (N/A, 8/19/2021); sinus surgery; Nasal septum surgery; Esophagogastroduodenoscopy; and Upper gastrointestinal endoscopy (N/A, 10/26/2022). Chart Reviewed: Yes  Subjective: Patient awake, alert, and agreeable to speech therapy services. Family at [facility]. Behavior/Cognition: Alert; Cooperative;Pleasant mood  Communication Observation: Functional  Follows Directions: Simple    Prior Dysphagia History: Patient and family deny prior difficulty with swallowing and PO intake. Current Diet : Easy to chew  Current Liquid Diet : Thin    Respiratory Status: Room air              Pain: Patient denies pain                                            OBJECTIVE           Dentition: Adequate             Oropharyngeal Phase:     Assessment Method(s): Observation  Consistency Presented: Regular; Thin  How Presented: Self-fed/presented  Oral Residue: None  Aspiration Signs/Symptoms: None  Pharyngeal Phase Characteristics: No impairment, issues, or problems        Oral Phase - Comment: Patient agreeable to PO trials, cosnumed 4 oz of thin liquids via cup and x's 1 megan cracker. Patient able to feed self on all PO trials. Oral phase of swallow was unremarkable. Pharyngeal Phase: Pharyngeal phase of swallow was unremarkable as well. No overt signs of aspiration were noted with all PO intake. Patient denies difficulty with PO intake and agreeable for diet advancement to regular diet with thin liquids.   PLAN    Duration/Frequency: No treatment indicated at this time    Dysphagia Outcome and Severity Scale (BRENNAN)  Dysphagia Outcome Severity Scale: Level 7: Normal in all situations  Interpretation of Tool: The Dysphagia Outcome and Severity Scale (BRENNAN) is a simple, easy-to-use, 7-point scale developed to systematically rate the functional severity of dysphagia based on objective assessment and make recommendations for diet level, independence level, and type of nutrition. Normal(7), Functional(6), Mild(5), Mild-Moderate(4), Moderate(3), Moderate-Severe(2), Severe(1)         Education: Patient, Family member  Patient Education: Provided as to role of ST services, diet options, discussed patient's function compared to baseline related to speech, language, swallowing, cognition, and voice. Patient and family agreeable for speech therapy services to sign off at this time. Patient Education Response: Verbalizes understanding    PRECAUTIONS/ALLERGIES: Patient has no known allergies.    Safety Devices in place: Yes  Type of devices: Call light within reach        Therapy Time  SLP Individual Minutes  Time In: 5325  Time Out: 0940  Minutes: [address]  2/26/2023 9:44 AM

## 2023-02-26 NOTE — PROGRESS NOTES
ACUTE PHYSICAL THERAPY GOALS:   (Developed with and agreed upon by patient and/or caregiver.)  All goals met: clear for d/c  LTG:  (1.)Mr. Cristobal Smith will move from supine to sit and sit to supine , scoot up and down, and roll side to side in bed with INDEPENDENT within 7 treatment day(s). (2.)Mr. Cristobal Smith will transfer from bed to chair and chair to bed with INDEPENDENT using the least restrictive device within 7 treatment day(s). (3.)Mr. Cristobal Smith will ambulate with INDEPENDENT for 500 feet with the least restrictive device within 7 treatment day(s). (4.)Mr. Cristobal Smith will tolerate at least 8 min of dynamic standing activity to assist standing ADLs with the least restrictive device within 7 treatment days. ________________________________________________________________________________________________      PHYSICAL THERAPY Initial Assessment, Daily Note, and AM  (Link to Caseload Tracking: PT Visit Days : 1  Acknowledge Orders  Time In/Out  PT Charge Capture  Rehab Caseload Tracker    Cuco Christiansen is a 65 y.o. male   PRIMARY DIAGNOSIS: Dysarthria  Transient alteration of awareness [R40.4]  Dysarthria [R47.1]       Reason for Referral: Generalized Muscle Weakness (M62.81)  Other lack of cordination (R27.8)  Difficulty in walking, Not elsewhere classified (R26.2)  Other abnormalities of gait and mobility (R26.89)  Inpatient: Payor: Stephanie Prescott / Plan: Anabell Esteves / Product Type: *No Product type* /     ASSESSMENT:     REHAB RECOMMENDATIONS:   Recommendation to date pending progress:  Setting:  No further skilled therapy after discharge from hospital    Equipment:    None     ASSESSMENT:  Mr. Cristobal Smith presents in supine, A&Ox4, agreeable to session. He moves with independence, including bed mobility, tranfers, and ambulation of 500'. He is clear for d/c at this time.       [employer] AM-PAC 6 Clicks Basic Mobility Inpatient Short Form  AM-PAC Basic Mobility - Inpatient   How much help is needed turning from your back to your side while in a flat bed without using bedrails?: None  How much help is needed moving from lying on your back to sitting on the side of a flat bed without using bedrails?: None  How much help is needed moving to and from a bed to a chair?: None  How much help is needed standing up from a chair using your arms?: None  How much help is needed walking in hospital room?: None  How much help is needed climbing 3-5 steps with a railing?: None  AM-PAC Inpatient Mobility Raw Score : 24  AM-PAC Inpatient T-Scale Score : 61.14  Mobility Inpatient CMS 0-100% Score: 0  Mobility Inpatient CMS G-Code Modifier : CH    SUBJECTIVE:   Mr. Lou Nunez states, \"I work in [employer] \"     Social/Functional    -works construction, independent at baseline  OBJECTIVE:     PAIN: Anice Nian / O2: PRECAUTION / Valentin Blizzard / Vedia Heather:   Pre Treatment: 0         Post Treatment: 0 Vitals        Oxygen      None    RESTRICTIONS/PRECAUTIONS:                    GROSS EVALUATION: Intact Impaired (Comments):   AROM [x]     PROM [x]    Strength [x]     Balance [x]     Posture [] N/A   Sensation [x]     Coordination [x]      Tone [x]     Edema [x]    Activity Tolerance [x]      []      COGNITION/  PERCEPTION: Intact Impaired (Comments):   Orientation [x]     Vision [x]     Hearing [x]     Cognition  [x]       MOBILITY: I Mod I S SBA CGA Min Mod Max Total  NT x2 Comments:   Bed Mobility    Rolling [x] [] [] [] [] [] [] [] [] [] []    Supine to Sit [x] [] [] [] [] [] [] [] [] [] []    Scooting [x] [] [] [] [] [] [] [] [] [] []    Sit to Supine [x] [] [] [] [] [] [] [] [] [] []    Transfers    Sit to Stand [x] [] [] [] [] [] [] [] [] [] []    Bed to Chair [x] [] [] [] [] [] [] [] [] [] []    Stand to Sit [x] [] [] [] [] [] [] [] [] [] []     [] [] [] [] [] [] [] [] [] [] []    I=Independent, Mod I=Modified Independent, S=Supervision, SBA=Standby Assistance, CGA=Contact Guard Assistance,   Min=Minimal Assistance, Mod=Moderate Assistance, Max=Maximal Assistance, Total=Total Assistance, NT=Not Tested    GAIT: I Mod I S SBA CGA Min Mod Max Total  NT x2 Comments:   Level of Assistance [x] [] [] [] [] [] [] [] [] [] []    Distance    500 feet    DME None    Gait Quality WNL    Weightbearing Status      Stairs      I=Independent, Mod I=Modified Independent, S=Supervision, SBA=Standby Assistance, CGA=Contact Guard Assistance,   Min=Minimal Assistance, Mod=Moderate Assistance, Max=Maximal Assistance, Total=Total Assistance, NT=Not Tested    PLAN:   FREQUENCY AND DURATION: 3 times/week for duration of hospital stay or until stated goals are met, whichever comes first.    THERAPY PROGNOSIS: Excellent    PROBLEM LIST:   (Skilled intervention is medically necessary to address:)  Decreased Activity Tolerance INTERVENTIONS PLANNED:   (Benefits and precautions of physical therapy have been discussed with the patient.)  Self Care Training  Therapeutic Activity  Therapeutic Exercise/HEP  Neuromuscular Re-education  Gait Training  Manual Therapy  Modalities  Education       TREATMENT:   EVALUATION: LOW COMPLEXITY: (Untimed Charge)    TREATMENT:   Therapeutic Activity (8 Minutes): Therapeutic activity included Rolling, Supine to Sit, Scooting, Transfer Training, Ambulation on level ground, Sitting balance , and Standing balance to improve functional Activity tolerance, Balance, Coordination, Mobility, Strength, and ROM. TREATMENT GRID:  N/A    AFTER TREATMENT PRECAUTIONS: Chair and Needs within reach    INTERDISCIPLINARY COLLABORATION:  RN/ PCT, PT/ PTA, and OT/ BUENROSTRO    EDUCATION: Education Given To: Patient; Family    TIME IN/OUT:  Time In: [facility]  Time Out: [address]  Minutes: [address] [facility],

## 2023-02-26 NOTE — H&P
Hospitalist Admission History and Physical         NAME:            Carole Lewis. Age:                65 y.o.    :               1965    MRN:              [id number]    PCP: Woodrow Miranda MD    Consulting MD:    Treatment Team: Attending Provider: Roberto Arreguin DO; Registered Nurse: Maria Esther Rosa RN         No chief complaint on file. HPI:    Patient is a 65 y.o. male who presented to the ED for cc difficutly with speech and AMS that has been waxing and waning since last night. Wife noted slurred speech this AM along with difficulty using his right hand when trying to eat his breakfast. Now, back to baseline per the family. TSH and ammonia normal. Labs near his baseline (chronically has leukopenia and thrombocytopenia). Family states he was recently placed on remeron last week to help with insomnia. Hx of DM type II, GERD, CANNON with gastric/esophageal varices, depression, leukopenia, thrombocytopenia, insomnia, and MORGAN on CPAP. CT head -   No findings of acute intracranial hemorrhage, acute large vessel territory    infarction, or acute intracranial mass effect. If the etiology of the patient's    reported altered mental status remains unclear; consider dedicated MRI brain    examination if clinically feasible for further assessment. 2.  Mild generalized parenchymal volume loss. Mild-moderate white matter    disease, nonspecific, but most commonly attributed to mild-moderate chronic    small vessel ischemic changes in a patient of this age. 3.  Incidental findings, as detailed.      Past Medical History:   Diagnosis Date    Cancer ([facility] [address]     skin CA    Diabetes ([facility] [address]     type 2; metformin - fbs 130- A1C 5.5 (2022)-- denies hypoglycemic episodes    Esophageal varices ([address]     controlled with med    GERD (gastroesophageal reflux disease)     nexium daily,well controlled    Hearing reduced     Hoarseness     Hypertension     managed with meds Laryngopharyngeal reflux     nexium daily,well controlled    Liver disease     \"fatty liver-\"elevated enzymes: monitored by GI and PCP    Mild depression 09/18/2018    managed with meds    Morbid obesity ([facility] [address]     BMI- 39    CANNON (nonalcoholic steatohepatitis)     monitored by GI    Obstructive sleep apnea on CPAP 7/18/2016    Skin cancer     non-melanoma    Thrombocytopenia ([facility] [address]     2018 Dr Dm Maloney -            Past Surgical History:   Procedure Laterality Date    COLONOSCOPY N/A 8/30/2018    COLONOSCOPY/ 40 performed by Elin Clement MD at [facility] N/A 8/19/2021    COLONOSCOPY/ 41 performed by Jose Cruz Barahona MD at [facility] ENDOSCOPY    ESOPHAGOGASTRODUODENOSCOPY      multiple    KNEE SURGERY Left 03/01/2019    NASAL SEPTUM SURGERY      ORTHOPEDIC SURGERY Left 2017    wrist fx    ORTHOPEDIC SURGERY Right 2008    wrist fx    SINUS SURGERY      UPPER GASTROINTESTINAL ENDOSCOPY N/A 10/26/2022    EGD CONTROL HEMORRHAGE performed by Jessica Christy MD at [facility] ENDOSCOPY            Family History   Problem Relation Age of Onset    Hypertension Father     Dementia Father     Diabetes Father     Cancer Mother        Family history reviewed and negative except as noted above. Social History     Social History Narrative    Not on file            Social History     Tobacco Use    Smoking status: Never    Smokeless tobacco: Never   Substance Use Topics    Alcohol use: No            Social History     Substance and Sexual Activity   Drug Use No                 No Known Allergies         Prior to Admission medications    Medication Sig Start Date End Date Taking?  Authorizing Provider   MELATONIN PO Take by mouth nightly as needed Dosage unknown    Historical Provider, MD   diphenhydrAMINE (BENADRYL ALLERGY) 25 MG capsule Take 50 mg by mouth nightly as needed for Itching    Historical Provider, MD   CPAP Machine MISC by Does not apply route at bedtime    Historical Provider, MD   mirtazapine (REMERON) 15 MG tablet Take 1 tablet by mouth nightly 2/17/23   Roxana Solorio MD   lisinopril (PRINIVIL;ZESTRIL) 10 MG tablet Take 1 tablet by mouth daily 11/23/22   JOVANNI Pollock MD   esomeprazole Magnesium (NEXIUM) 40 MG PACK Take 40 mg by mouth daily    Historical Provider, MD   acetaminophen (TYLENOL) 500 MG tablet Take 500 mg by mouth every 6 hours as needed for Pain    Historical Provider, MD   vitamin B-12 (CYANOCOBALAMIN) 1000 MCG tablet Take 1,000 mcg by mouth daily    Historical Provider, MD   atorvastatin (LIPITOR) 10 MG tablet TAKE 1 TABLET BY MOUTH EVERY DAY  Patient taking differently: Take 10 mg by mouth every morning 10/7/22   Roxana Solorio MD   blood glucose test strips (RELION PREMIER TEST) strip Testing daily as directed 3/4/19   Historical Provider, MD   Blood Glucose Monitoring Suppl ([address]) LES by Does not apply route    Historical Provider, MD   metFORMIN (GLUCOPHAGE-XR) 500 MG extended release tablet Take 1 tablet by mouth daily TAKE 1 TABLET BY MOUTH EVERY DAY WITH DINNER 6/27/22   Roxana Solorio MD   fluticasone Ana Her) 50 MCG/ACT nasal spray 2 sprays by Nasal route daily 8/18/16   Ar Automatic Reconciliation   nadolol (CORGARD) 40 MG tablet Take 40 mg by mouth nightly 9/6/18   Ar Automatic Reconciliation   sildenafil (VIAGRA) 50 MG tablet Take 50 mg by mouth as needed 11/25/20   Ar Automatic Reconciliation                      Review of Systems         Constitutional: NAD    Eyes:  no change in visual acuity, no photophobia    Ears, nose, mouth, throat, and face: no  Odynphagia, dysphagia, no thrush or exudate, negative for chronic sinus congestion, recurrent headaches    Respiratory: negative for SOB, hemoptysis or cough    Cardiovascular: negative for CP, palpitations, or PND    Gastrointestinal: negative for abdominal pain, no hematemesis, hematochezia or BRBPR    Genitourinary: no urgency, frequency, or dysuria, no nocturia    Integument/breast: negative for skin rash or skin lesions    Hematologic/lymphatic: negative for known bleeding disorder    Musculoskeletal:Right arm weakness that has resolved     Neurological: confusion that has resolved. Behavioral/Psych: negative for depression or chronic anxiety,    Endocrine: negative for polydyspia, polyuria or intolerance to heat or cold    Allergic/Immunologic: negative for chronic allergic rhinitis, or known connective tissue disorder              Objective:         Patient Vitals for the past 24 hrs:   Temp Pulse Resp BP SpO2   02/25/23 1829 -- 79 20 (!) 140/73 99 %   02/25/23 1621 98.1 °F (36.7 °C) 76 12 (!) 149/80 97 %            No intake/output data recorded. No intake/output data recorded.          Data Review:   Recent Results (from the past 24 hour(s))   POCT Glucose    Collection Time: 02/25/23  4:17 PM   Result Value Ref Range    POC Glucose 115 (H) 65 - 100 mg/dL    Performed by: Beryl    CBC with Auto Differential    Collection Time: 02/25/23  4:27 PM   Result Value Ref Range    WBC 4.0 (L) 4.3 - 11.1 K/uL    RBC 3.76 (L) 4.23 - 5.6 M/uL    Hemoglobin 11.2 (L) 13.6 - 17.2 g/dL    Hematocrit 34.1 (L) 41.1 - 50.3 %    MCV 90.7 82 - 102 FL    MCH 29.8 26.1 - 32.9 PG    MCHC 32.8 31.4 - 35.0 g/dL    RDW 16.1 (H) 11.9 - 14.6 %    Platelets 75 (L) 103 - 450 K/uL    MPV 11.0 9.4 - 12.3 FL    nRBC 0.00 0.0 - 0.2 K/uL    Differential Type AUTOMATED      Seg Neutrophils 69 43 - 78 %    Lymphocytes 16 13 - 44 %    Monocytes 10 4.0 - 12.0 %    Eosinophils % 4 0.5 - 7.8 %    Basophils 1 0.0 - 2.0 %    Immature Granulocytes 0 0.0 - 5.0 %    Segs Absolute 2.8 1.7 - 8.2 K/UL    Absolute Lymph # 0.7 0.5 - 4.6 K/UL    Absolute Mono # 0.4 0.1 - 1.3 K/UL    Absolute Eos # 0.2 0.0 - 0.8 K/UL    Basophils Absolute 0.0 0.0 - 0.2 K/UL    Absolute Immature Granulocyte 0.0 0.0 - 0.5 K/UL   Comprehensive Metabolic Panel    Collection Time: 02/25/23  4:27 PM   Result Value Ref Range    Sodium 142 133 - 143 mmol/L    Potassium 4.6 3.5 - 5.1 mmol/L    Chloride 116 (H) 101 - 110 mmol/L    CO2 23 21 - 32 mmol/L    Anion Gap 3 2 - 11 mmol/L    Glucose 125 (H) 65 - 100 mg/dL    BUN 12 6 - 23 MG/DL    Creatinine 0.70 (L) 0.8 - 1.5 MG/DL    Est, Glom Filt Rate >60 >60 ml/min/1.73m2    Calcium 8.7 8.3 - 10.4 MG/DL    Total Bilirubin 2.0 (H) 0.2 - 1.1 MG/DL    ALT 52 12 - 65 U/L    AST 49 (H) 15 - 37 U/L    Alk Phosphatase 59 50 - 136 U/L    Total Protein 6.1 (L) 6.3 - 8.2 g/dL    Albumin 3.0 (L) 3.5 - 5.0 g/dL    Globulin 3.1 2.8 - 4.5 g/dL    Albumin/Globulin Ratio 1.0 0.4 - 1.6     Lipase    Collection Time: 02/25/23  4:27 PM   Result Value Ref Range    Lipase 176 73 - 393 U/L   Magnesium    Collection Time: 02/25/23  4:27 PM   Result Value Ref Range    Magnesium 1.8 1.8 - 2.4 mg/dL   COVID-19, Rapid    Collection Time: 02/25/23  4:27 PM    Specimen: Nasopharyngeal   Result Value Ref Range    Source NASAL      SARS-CoV-2, Rapid Not detected NOTD     TSH    Collection Time: 02/25/23  4:27 PM   Result Value Ref Range    TSH, 3RD GENERATION 1.090 0.358 - 3.740 uIU/mL   Lactic Acid    Collection Time: 02/25/23  4:27 PM   Result Value Ref Range    Lactic Acid, Plasma 1.1 0.4 - 2.0 MMOL/L   Protime-INR    Collection Time: 02/25/23  4:27 PM   Result Value Ref Range    Protime 16.0 (H) 12.6 - 14.3 sec    INR 1.2     APTT    Collection Time: 02/25/23  4:27 PM   Result Value Ref Range    PTT 30.4 24.5 - 34.2 SEC   POCT Glucose    Collection Time: 02/25/23  6:12 PM   Result Value Ref Range    POC Glucose 105 (H) 65 - 100 mg/dL    Performed by: Ronda Cohen    Ammonia    Collection Time: 02/25/23  6:22 PM   Result Value Ref Range    Ammonia 32 11 - 32 UMOL/L            Physical Exam:         General:    Alert, cooperative, slight slurred speech but this is his baseline    Eyes:    Conjunctivae/corneas clear.  PERRL    Ears:    Normal     Nose:    Nares normal.     Mouth/Throat:    No facial droop but slight right sided tongue deviation     Neck:    no JVD.    Back:     deferred    Lungs:     Clear to auscultation bilaterally.    Heart:    Regular rate and rhythm, S1, S2 normal    Abdomen:     Soft, non-tender. Bowel sounds normal. Obese    Extremities:    5/5 strength to UE and LE bilaterally. No obvious deficit    Skin:    Skin color, texture, turgor normal. No rashes or lesions    Neurologic:    As above          Assessment and Plan         Principal Problem:    Dysarthria  Active Problems:    Obstructive sleep apnea on CPAP    Controlled type 2 diabetes mellitus without complication, without long-term current use of insulin (HCC)    Thrombocytopenia (HCC)    Liver cirrhosis secondary to CANNON (nonalcoholic steatohepatitis) (HCC)    Esophageal and gastric varices (HCC)    Gastroesophageal reflux disease without esophagitis    Hypercholesterolemia    Essential hypertension  Resolved Problems:    * No resolved hospital problems. *    Dysarthria and confusion - CVA work up. Increase statin dose. ASA. Will also hold remeron since some side effects from remeron can be confusion, difficulty walking, etc. Possibly CVA versus TIA versus medication side effect    Insomina - PRN melatonin     DM type II - SS    HTN - Allowing permissive HTN for 24 hours so holding his nadalol tonight. Can restart his ACE in AM.     CANNON with gastric/esophageal varices - Can restart nadalol in AM    MORGAN- CPAP    PPI    Likely can dc 2/26    DVT prophylaxis - SCDs  Signed By:    KM JONES DO    February 25, 2023

## 2023-02-27 ENCOUNTER — APPOINTMENT (OUTPATIENT)
Dept: NON INVASIVE DIAGNOSTICS | Age: 58
DRG: 065 | End: 2023-02-27
Payer: COMMERCIAL

## 2023-02-27 VITALS
OXYGEN SATURATION: 98 % | HEART RATE: 80 BPM | TEMPERATURE: 98.7 F | SYSTOLIC BLOOD PRESSURE: 132 MMHG | BODY MASS INDEX: 40.6 KG/M2 | RESPIRATION RATE: 19 BRPM | HEIGHT: 71 IN | WEIGHT: 290 LBS | DIASTOLIC BLOOD PRESSURE: 69 MMHG

## 2023-02-27 PROBLEM — I85.00 ESOPHAGEAL AND GASTRIC VARICES (HCC): Chronic | Status: ACTIVE | Noted: 2018-09-18

## 2023-02-27 PROBLEM — E11.9 CONTROLLED TYPE 2 DIABETES MELLITUS WITHOUT COMPLICATION, WITHOUT LONG-TERM CURRENT USE OF INSULIN (HCC): Chronic | Status: ACTIVE | Noted: 2018-04-16

## 2023-02-27 PROBLEM — I63.9 ISCHEMIC STROKE (HCC): Status: ACTIVE | Noted: 2023-02-25

## 2023-02-27 PROBLEM — K74.60 LIVER CIRRHOSIS SECONDARY TO NASH (NONALCOHOLIC STEATOHEPATITIS) (HCC): Chronic | Status: ACTIVE | Noted: 2018-09-18

## 2023-02-27 PROBLEM — I10 ESSENTIAL HYPERTENSION: Chronic | Status: ACTIVE | Noted: 2017-12-13

## 2023-02-27 PROBLEM — K75.81 LIVER CIRRHOSIS SECONDARY TO NASH (NONALCOHOLIC STEATOHEPATITIS) (HCC): Chronic | Status: ACTIVE | Noted: 2018-09-18

## 2023-02-27 PROBLEM — E78.00 HYPERCHOLESTEROLEMIA: Chronic | Status: ACTIVE | Noted: 2017-12-13

## 2023-02-27 PROBLEM — D69.6 THROMBOCYTOPENIA (HCC): Chronic | Status: ACTIVE | Noted: 2018-04-16

## 2023-02-27 PROBLEM — G45.9 TIA (TRANSIENT ISCHEMIC ATTACK): Status: ACTIVE | Noted: 2023-02-25

## 2023-02-27 PROBLEM — I86.4 ESOPHAGEAL AND GASTRIC VARICES (HCC): Chronic | Status: ACTIVE | Noted: 2018-09-18

## 2023-02-27 LAB
ECHO AO ASC DIAM: 3.1 CM
ECHO AO ASCENDING AORTA INDEX: 1.26 CM/M2
ECHO AO ROOT DIAM: 3.7 CM
ECHO AO ROOT INDEX: 1.5 CM/M2
ECHO AR MAX VEL PISA: 3.8 M/S
ECHO AV AREA PEAK VELOCITY: 2.6 CM2
ECHO AV AREA VTI: 2.4 CM2
ECHO AV AREA/BSA PEAK VELOCITY: 1.1 CM2/M2
ECHO AV AREA/BSA VTI: 1 CM2/M2
ECHO AV MEAN GRADIENT: 15 MMHG
ECHO AV MEAN VELOCITY: 1.8 M/S
ECHO AV PEAK GRADIENT: 27 MMHG
ECHO AV PEAK VELOCITY: 2.6 M/S
ECHO AV REGURGITANT PHT: 655 MS
ECHO AV VELOCITY RATIO: 0.69
ECHO AV VTI: 54.9 CM
ECHO BSA: 2.57 M2
ECHO LA AREA 2C: 26.5 CM2
ECHO LA AREA 4C: 25.8 CM2
ECHO LA DIAMETER INDEX: 1.94 CM/M2
ECHO LA DIAMETER: 4.8 CM
ECHO LA MAJOR AXIS: 6.5 CM
ECHO LA MINOR AXIS: 6.8 CM
ECHO LA TO AORTIC ROOT RATIO: 1.3
ECHO LA VOL 2C: 82 ML (ref 18–58)
ECHO LA VOL 4C: 78 ML (ref 18–58)
ECHO LA VOL BP: 81 ML (ref 18–58)
ECHO LA VOL/BSA BIPLANE: 33 ML/M2 (ref 16–34)
ECHO LA VOLUME INDEX A2C: 33 ML/M2 (ref 16–34)
ECHO LA VOLUME INDEX A4C: 32 ML/M2 (ref 16–34)
ECHO LV E' LATERAL VELOCITY: 13 CM/S
ECHO LV E' SEPTAL VELOCITY: 9 CM/S
ECHO LV EDV A2C: 163 ML
ECHO LV EDV A4C: 178 ML
ECHO LV EDV INDEX A4C: 72 ML/M2
ECHO LV EDV NDEX A2C: 66 ML/M2
ECHO LV EJECTION FRACTION A2C: 67 %
ECHO LV EJECTION FRACTION A4C: 64 %
ECHO LV EJECTION FRACTION BIPLANE: 64 % (ref 55–100)
ECHO LV ESV A2C: 55 ML
ECHO LV ESV A4C: 65 ML
ECHO LV ESV INDEX A2C: 22 ML/M2
ECHO LV ESV INDEX A4C: 26 ML/M2
ECHO LV FRACTIONAL SHORTENING: 36 % (ref 28–44)
ECHO LV INTERNAL DIMENSION DIASTOLE INDEX: 1.7 CM/M2
ECHO LV INTERNAL DIMENSION DIASTOLIC: 4.2 CM (ref 4.2–5.9)
ECHO LV INTERNAL DIMENSION SYSTOLIC INDEX: 1.09 CM/M2
ECHO LV INTERNAL DIMENSION SYSTOLIC: 2.7 CM
ECHO LV IVSD: 1.2 CM (ref 0.6–1)
ECHO LV MASS 2D: 178.2 G (ref 88–224)
ECHO LV MASS INDEX 2D: 72.1 G/M2 (ref 49–115)
ECHO LV POSTERIOR WALL DIASTOLIC: 1.2 CM (ref 0.6–1)
ECHO LV RELATIVE WALL THICKNESS RATIO: 0.57
ECHO LVOT AREA: 3.8 CM2
ECHO LVOT AV VTI INDEX: 0.64
ECHO LVOT DIAM: 2.2 CM
ECHO LVOT MEAN GRADIENT: 7 MMHG
ECHO LVOT PEAK GRADIENT: 13 MMHG
ECHO LVOT PEAK VELOCITY: 1.8 M/S
ECHO LVOT STROKE VOLUME INDEX: 54 ML/M2
ECHO LVOT SV: 133.4 ML
ECHO LVOT VTI: 35.1 CM
ECHO MV A VELOCITY: 0.83 M/S
ECHO MV AREA VTI: 3 CM2
ECHO MV E DECELERATION TIME (DT): 208 MS
ECHO MV E VELOCITY: 1.39 M/S
ECHO MV E/A RATIO: 1.67
ECHO MV E/E' LATERAL: 10.69
ECHO MV E/E' RATIO (AVERAGED): 13.07
ECHO MV E/E' SEPTAL: 15.44
ECHO MV LVOT VTI INDEX: 1.28
ECHO MV MAX VELOCITY: 1.5 M/S
ECHO MV MEAN GRADIENT: 4 MMHG
ECHO MV MEAN VELOCITY: 1 M/S
ECHO MV PEAK GRADIENT: 9 MMHG
ECHO MV REGURGITANT PEAK GRADIENT: 125 MMHG
ECHO MV REGURGITANT PEAK VELOCITY: 5.6 M/S
ECHO MV REGURGITANT VTIA: 166 CM
ECHO MV VTI: 44.8 CM
ECHO PV ACCELERATION TIME (AT): 155 MS
ECHO PV MAX VELOCITY: 1.5 M/S
ECHO PV PEAK GRADIENT: 9 MMHG
ECHO RV BASAL DIMENSION: 4.2 CM
ECHO RV FREE WALL PEAK S': 20 CM/S
ECHO RV TAPSE: 2.3 CM (ref 1.7–?)
ECHO TV REGURGITANT MAX VELOCITY: 2.82 M/S
ECHO TV REGURGITANT PEAK GRADIENT: 32 MMHG
FOLATE SERPL-MCNC: 11.7 NG/ML (ref 3.1–17.5)
GLUCOSE BLD STRIP.AUTO-MCNC: 123 MG/DL (ref 65–100)
LV EF: 64 %
LVEF MODALITY: ABNORMAL
SERVICE CMNT-IMP: ABNORMAL
VIT B12 SERPL-MCNC: 1043 PG/ML (ref 193–986)

## 2023-02-27 PROCEDURE — 82746 ASSAY OF FOLIC ACID SERUM: CPT

## 2023-02-27 PROCEDURE — 82607 VITAMIN B-12: CPT

## 2023-02-27 PROCEDURE — 93306 TTE W/DOPPLER COMPLETE: CPT | Performed by: INTERNAL MEDICINE

## 2023-02-27 PROCEDURE — 6370000000 HC RX 637 (ALT 250 FOR IP): Performed by: FAMILY MEDICINE

## 2023-02-27 PROCEDURE — 93306 TTE W/DOPPLER COMPLETE: CPT

## 2023-02-27 PROCEDURE — 36415 COLL VENOUS BLD VENIPUNCTURE: CPT

## 2023-02-27 PROCEDURE — 82962 GLUCOSE BLOOD TEST: CPT

## 2023-02-27 RX ORDER — ACETAMINOPHEN 500 MG
500 TABLET ORAL EVERY 6 HOURS PRN
Qty: 30 TABLET | Refills: 1
Start: 2023-02-27

## 2023-02-27 RX ORDER — ASPIRIN 81 MG/1
81 TABLET ORAL DAILY
Qty: 30 TABLET | Refills: 3 | Status: SHIPPED | OUTPATIENT
Start: 2023-02-27

## 2023-02-27 RX ADMIN — PANTOPRAZOLE SODIUM 40 MG: 40 TABLET, DELAYED RELEASE ORAL at 05:53

## 2023-02-27 RX ADMIN — LISINOPRIL 10 MG: 5 TABLET ORAL at 08:37

## 2023-02-27 RX ADMIN — ATORVASTATIN CALCIUM 40 MG: 40 TABLET, FILM COATED ORAL at 08:36

## 2023-02-27 NOTE — CARE COORDINATION
Pt was medically cleared for dc to home today. No dc needs or concerns identified or reported. 02/26/23 1317   Service Assessment   Patient Orientation Alert and Oriented   Cognition Alert   History Provided By Medical Record;Spouse   Primary Caregiver Self   Support Systems Spouse/Significant Other   Patient's Healthcare Decision Maker is: Legal Next of Kin   PCP Verified by CM Yes   Last Visit to PCP Within last 3 months   Prior Functional Level Independent in ADLs/IADLs   Current Functional Level Independent in ADLs/IADLs   Can patient return to prior living arrangement Yes   Ability to make needs known: Good   Family able to assist with home care needs: Yes   Would you like for me to discuss the discharge plan with any other family members/significant others, and if so, who? No   Financial Resources Other (Comment)  ([employer])   Social/Functional History   Lives With Spouse   Type of Home House   Bathroom Shower/Tub [id number] [facility] Yes   Discharge Planning   Type of [facility] Spouse/Significant Other   Current Services Prior To Admission C-pap   Potential Assistance Needed N/A   DME Ordered? No   Potential Assistance Purchasing Medications No   Type of Home Care Services None   Patient expects to be discharged to: Viel[facility] Mcpherson 90 Discharge   Transition of Care Consult (CM Consult) Discharge [facility] 1690 Discharge None   [facility] Information Provided? No   Mode of Transport at Discharge Other (see comment)  (family)   Confirm Follow Up Transport Family   Condition of Participation: Discharge Planning   The Plan for Transition of Care is related to the following treatment goals: Pt will return home with supportive family at his functional baseline. The Patient and/Or Patient Representative agree with the Discharge Plan?  Yes

## 2023-02-27 NOTE — DISCHARGE SUMMARY
Hospitalist Discharge Summary   Admit Date:  2023  4:14 PM   DC Note date: 2023  Name:  Helen Lees. Age:  65 y.o. Sex:  male  :  1965   MRN:  [id number]   Room:  [facility]  PCP:  Dejan Rivas MD    Presenting Complaint: No chief complaint on file. Initial Admission Diagnosis: Transient alteration of awareness [R40.4]  Dysarthria [R47.1]     Problem List for this Hospitalization (present on admission):    Principal Problem:    Acute ischemic stroke [facility])  Active Problems:    Obstructive sleep apnea on CPAP    Controlled type 2 diabetes mellitus without complication, without long-term current use of insulin (HCC)    Thrombocytopenia (HCC)    Liver cirrhosis secondary to CANNON (nonalcoholic steatohepatitis) (HCC)    Esophageal and gastric varices (HCC)    Gastroesophageal reflux disease without esophagitis    Hypercholesterolemia    Essential hypertension  Resolved Problems:    * No resolved hospital problems. [facility] Course:  Mr. Ankur Sheridan is a 65 y.o. male w/ a PMH of DM type II, GERD, CANNON with gastric / esophageal varices, depression, leukopenia, thrombocytopenia, insomnia, and MORGAN on CPAP who presented to the ER due to difficulty with speech and AMS that has been waxing and waning since last night. Wife noted slurred speech this AM along with difficulty using his right hand when trying to eat his breakfast.  Now, back to baseline per the family. TSH and ammonia normal.  Labs near his baseline (chronically has leukopenia and thrombocytopenia). Family states he was recently placed on Remeron last week to help with insomnia. CT head was non acute. CTA head / neck w/ no occlusion or stenosis. He was admitted to the Hospitalist service for further work up. Preliminary MRI brain  report possible tiny lacunar infarct to his left temporal lobe. This was not reported in final read but symptoms fit with minor stroke. He should continue aspirin.   No plavix due to thrombocytopenia and bleed risk from liver disease. LDL at goal on home statin dose; have spoken to Neurology and did not recommend high intensity statin unless LDL >70    TTE read is pending but neurology has said in past this can be done as outpatient so I see no barrier to discharge at this time. Also would not  most likely. Would not start AC on him due to bleed risk. Disposition: home  Diet: ADULT DIET; Regular; 4 carb choices (60 gm/meal); Low Fat/Low Chol/High Fiber/2 gm Na  Code Status: Full Code    Follow Ups:      Time spent in patient discharge and coordination 35 minutes. Follow up labs/diagnostics (ultimately defer to outpatient provider):  CBC, BMP    Plan was discussed with pt. All questions answered. Patient was stable at time of discharge. Instructions given to call a physician or return if any concerns.     Current Discharge Medication List        START taking these medications    Details   aspirin 81 MG EC tablet Take 1 tablet by mouth daily  Qty: 30 tablet, Refills: 3           CONTINUE these medications which have CHANGED    Details   acetaminophen (TYLENOL) 500 MG tablet Take 1 tablet by mouth every 6 hours as needed for Pain or Fever No more than 2g tylenol a day ideally  Qty: 30 tablet, Refills: 1           CONTINUE these medications which have NOT CHANGED    Details   lactulose (CHRONULAC) 10 GM/15ML solution Take 20 g by mouth 2 times daily      MELATONIN PO Take by mouth nightly as needed Dosage unknown      diphenhydrAMINE (BENADRYL) 25 MG capsule Take 50 mg by mouth nightly as needed for Itching Was taking to help sleep but stopped when remeron started      CPAP Machine MISC by Does not apply route at bedtime      lisinopril (PRINIVIL;ZESTRIL) 10 MG tablet Take 1 tablet by mouth daily  Qty: 90 tablet, Refills: 3    Associated Diagnoses: Essential (primary) hypertension      esomeprazole Magnesium (NEXIUM) 40 MG PACK Take 40 mg by mouth daily      vitamin B-12 (CYANOCOBALAMIN) 1000 MCG tablet Take 1,000 mcg by mouth daily      atorvastatin (LIPITOR) 10 MG tablet TAKE 1 TABLET BY MOUTH EVERY DAY  Qty: 90 tablet, Refills: 3      blood glucose test strips (RELION PREMIER TEST) strip Testing daily as directed      Blood Glucose Monitoring Suppl (RELION PRIME MONITOR) LES by Does not apply route      metFORMIN (GLUCOPHAGE-XR) 500 MG extended release tablet Take 1 tablet by mouth daily TAKE 1 TABLET BY MOUTH EVERY DAY WITH DINNER  Qty: 90 tablet, Refills: 3      fluticasone (FLONASE) 50 MCG/ACT nasal spray 2 sprays by Nasal route as needed As needed      nadolol (CORGARD) 40 MG tablet Take 40 mg by mouth nightly           STOP taking these medications       mirtazapine (REMERON) 15 MG tablet Comments:   Reason for Stopping:         sildenafil (VIAGRA) 50 MG tablet Comments:   Reason for Stopping:               Some medications may have been reported old/obsolete and marked \"stop taking\" by the system; in reality pt was already off these meds; defer to outpatient or prescribing providers. Procedures done this admission:  * No surgery found *    Consults this admission:  IP CONSULT TO CASE MANAGEMENT    Echocardiogram results:  No results found for this or any previous visit. Diagnostic Imaging/Tests:   CT HEAD WO CONTRAST    Result Date: 2/25/2023  1. No findings of acute intracranial hemorrhage, acute large vessel territory infarction, or acute intracranial mass effect. If the etiology of the patient's reported altered mental status remains unclear; consider dedicated MRI brain examination if clinically feasible for further assessment. 2.  Mild generalized parenchymal volume loss. Mild-moderate white matter disease, nonspecific, but most commonly attributed to mild-moderate chronic small vessel ischemic changes in a patient of this age. 3.  Incidental findings, as detailed. Stacy Merino MD Neuroradiologist [facility] [email or web address] Thank you for this referral. This exam was interpreted by a fellowship trained neuroradiologist. If the patient's healthcare provider has any questions, a [facility] neuror[facility]ologist can be reached directly at [phone] at any time. SLOT: 78  Argelia Lam 2/25/2023 5:00:00 PM    CTA head neck with contrast    Result Date: 2/25/2023  Normal CTA head and neck. Thank you for this referral. Beverly Frances M.D. Neuroradiologist [facility] [email or web address]   SLOT: 72   Beverly Frances M.D. 2/25/2023 8:21:00 PM    MRI brain without contrast    Result Date: 2/26/2023  1. Age-related senescent changes and minimal chronic microvascular disease without acute intracranial abnormality.  CPT code(s) A5183573        Labs: Results:       BMP, Mg, Phos Recent Labs     02/25/23 1627 02/26/23  0400    143   K 4.6 3.6   * 116*   CO2 23 23   ANIONGAP 3 4   BUN 12 12   CREATININE 0.70* 0.60*   LABGLOM >60 >60   CALCIUM 8.7 8.5   GLUCOSE 125* 120*   MG 1.8  --       CBC Recent Labs     02/25/23 1627 02/26/23  0400   WBC 4.0* 3.1*   RBC 3.76* 3.65*   HGB 11.2* 10.9*   HCT 34.1* 33.3*   MCV 90.7 91.2   MCH 29.8 29.9   MCHC 32.8 32.7   RDW 16.1* 16.3*   PLT 75* 68*   MPV 11.0 11.1   NRBC 0.00 0.00   SEGS 69  --    LYMPHOPCT 16  --    EOSRELPCT 4  --    MONOPCT 10  --    BASOPCT 1  --    IMMGRAN 0  --    SEGSABS 2.8  --    LYMPHSABS 0.7  --    EOSABS 0.2  --    MONOSABS 0.4  --    BASOSABS 0.0  --    ABSIMMGRAN 0.0  --       LFT Recent Labs     02/25/23 1627   BILITOT 2.0*   ALKPHOS 59   AST 49*   ALT 52   PROT 6.1*   LABALBU 3.0*   GLOB 3.1      Cardiac  No results found for: NTPROBNP, TROPHS   Coags Lab Results   Component Value Date/Time    PROTIME 16.0 02/25/2023 04:27 PM    INR 1.2 02/25/2023 04:27 PM    APTT 30.4 02/25/2023 04:27 PM      A1c Lab Results   Component Value Date/Time    LABA1C 5.7 02/26/2023 04:00 AM    LABA1C 5.7 01/27/2023 09:34 AM    LABA1C 5.5 08/05/2022 10:11 AM     02/26/2023 04:00 AM     01/27/2023 09:34 AM     08/05/2022 10:11 AM      Lipids Lab Results   Component Value Date/Time    CHOL 123 02/26/2023 04:00 AM    LDLCALC 43.2 02/26/2023 04:00 AM    LABVLDL 22.8 02/26/2023 04:00 AM    HDL 57 02/26/2023 04:00 AM    CHOLHDLRATIO 2.2 02/26/2023 04:00 AM    TRIG 114 02/26/2023 04:00 AM      Thyroid  Lab Results   Component Value Date/Time    RMW1QZD 1.090 02/25/2023 04:27 PM        Most Recent UA Lab Results   Component Value Date/Time    COLORU YELLOW/STRAW 02/25/2023 07:23 PM    APPEARANCE CLEAR 02/25/2023 07:23 PM    SPECGRAV 1.006 02/25/2023 07:23 PM    LABPH 6.0 02/25/2023 07:23 PM    PROTEINU Negative 02/25/2023 07:23 PM    GLUCOSEU Negative 02/25/2023 07:23 PM    KETUA Negative 02/25/2023 07:23 PM    BILIRUBINUR Negative 02/25/2023 07:23 PM    BLOODU TRACE 02/25/2023 07:23 PM    UROBILINOGEN 0.2 02/25/2023 07:23 PM    NITRU Negative 02/25/2023 07:23 PM    LEUKOCYTESUR Negative 02/25/2023 07:23 PM    WBCUA 0-4 02/25/2023 07:23 PM    RBCUA 0-5 02/25/2023 07:23 PM    EPITHUA 0-5 02/25/2023 07:23 PM    BACTERIA Negative 02/25/2023 07:23 PM    LABCAST 0-2 02/25/2023 07:23 PM        No results for input(s): CULTURE in the last 720 hours.     All Labs from Last 24 Hrs:  Recent Results (from the past 24 hour(s))   POCT Glucose    Collection Time: 02/26/23 11:30 AM   Result Value Ref Range    POC Glucose 136 (H) 65 - 100 mg/dL    Performed by: Melissa    POCT Glucose    Collection Time: 02/26/23  3:12 PM   Result Value Ref Range    POC Glucose 130 (H) 65 - 100 mg/dL    Performed by: Melissa    POCT Glucose    Collection Time: 02/26/23 10:20 PM   Result Value Ref Range    POC Glucose 115 (H) 65 - 100 mg/dL    Performed by: Sofía    Folate    Collection Time: 02/27/23  4:37 AM   Result Value Ref Range    Folate 11.7 3.1 - 17.5 ng/mL   Vitamin B12    Collection Time: 02/27/23  4:37 AM   Result Value Ref Range    Vitamin B-12 1043 (H) 193 - 986 pg/mL   POCT Glucose    Collection Time: 02/27/23  7:03 AM   Result Value Ref Range    POC Glucose 123 (H) 65 - 100 mg/dL    Performed by: Sofía        No Known Allergies  Immunization History   Administered Date(s) Administered    COVID-19, MODERNA BLUE border, Primary or Immunocompromised, (age 12y+), IM, 100 mcg/0.5mL 09/16/2021    Influenza Virus Vaccine 10/17/2019, 10/13/2020, 09/16/2021, 11/22/2022    Influenza, FLUARIX, FLULAVAL, FLUZONE (age 10 mo+) AND AFLURIA, (age 1 y+), PF, 0.5mL 10/17/2019, 10/13/2020, 09/16/2021    Influenza, FLUBLOK, (age 25 y+), PF, 0.5mL 09/18/2018    Pneumococcal conjugate PCV20, PF (Prevnar 20) 09/02/2022    Tdap (Boostrix, Adacel) 07/20/2018       Recent Vital Data:  Patient Vitals for the past 24 hrs:   Temp Pulse Resp BP SpO2   02/27/23 0800 98.7 °F (37.1 °C) 80 19 132/69 98 %   02/27/23 0400 97.9 °F (36.6 °C) 77 18 128/71 97 %   02/27/23 0000 97.3 °F (36.3 °C) 75 18 120/78 97 %   02/26/23 2210 -- -- 19 -- --   02/26/23 2000 98.6 °F (37 °C) 82 19 (!) 148/81 97 %   02/26/23 1600 98.6 °F (37 °C) 86 18 135/68 98 %   02/26/23 1200 98.6 °F (37 °C) 83 18 (!) 143/76 100 %       Oxygen Therapy  SpO2: 98 %  Pulse Oximeter Device Mode: Continuous  O2 Device: None (Room air)  Skin Assessment: Clean, dry, & intact    Estimated body mass index is 40.45 kg/m² as calculated from the following:    Height as of this encounter: 5' 11\" (1.803 m). Weight as of this encounter: 290 lb (131.5 kg). Intake/Output Summary (Last 24 hours) at 2/27/2023 0929  Last data filed at 2/26/2023 1737  Gross per 24 hour   Intake 462 ml   Output --   Net 462 ml         Physical Exam:    General:    Well nourished. No overt distress  Head:  Normocephalic, atraumatic  Eyes:  Sclerae appear normal.  Pupils equally round. HENT:  Nares appear normal, no drainage. Moist mucous membranes  Neck:  No restricted ROM. Trachea midline  CV:   RRR.   No JVD  Lungs:   Respirations even, unlabored  Abdomen:   nondistended. Extremities: Warm and dry. No cyanosis or clubbing. No edema. Skin:     No rashes. Normal coloration  Neuro:  CN II-XII grossly intact. Psych:  Normal mood and affect. Signed:  Griffin Hermosillo MD    Part of this note may have been written by using a voice dictation software. The note has been proof read but may still contain some grammatical/other typographical errors.

## 2023-02-28 ENCOUNTER — TELEPHONE (OUTPATIENT)
Dept: INTERNAL MEDICINE CLINIC | Facility: CLINIC | Age: 58
End: 2023-02-28

## 2023-02-28 ENCOUNTER — CARE COORDINATION (OUTPATIENT)
Dept: CARE COORDINATION | Facility: CLINIC | Age: 58
End: 2023-02-28

## 2023-02-28 NOTE — CARE COORDINATION
[facility] Care Transitions Initial Follow Up Call    Call within 2 business days of discharge: Yes    Patient Current Location:  Home: [address] [place] [address] 15 contacted the patient by telephone to perform post hospital discharge assessment. Verified name and  with patient as identifiers. Provided introduction to self, and explanation of the LPN Care Coordinator role. Patient: Kolton Malhotra. Patient : 1965   MRN: [id number]  Reason for Admission: Acute ischemic stroke  Discharge Date: 23 RARS: Readmission Risk Score: 13.8      Last Discharge [address]       Date Complaint Diagnosis Description Type Department Provider    23 hypoglycemic Dysarthria . .. ED to Hosp-Admission (Discharged) (ADMITTED) [id number] Ana Randall MD; Severiano Harrison ... Was this an external facility discharge? No Discharge Facility:   [address] [place] [address] to be reviewed by the provider   Additional needs identified to be addressed with provider: No  none               Method of communication with provider: none. Spoke with patient states fine. Patient denies any slurred speech or difficult using right hand. 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 Patient states has a follow up appointment with PCP 3/3/2023. LPN Care Coordinator reviewed discharge instructions with patient who verbalized understanding. The patient was given an opportunity to ask questions and does not have any further questions or concerns at this time. Were discharge instructions available to patient? Yes. Reviewed appropriate site of care based on symptoms and resources available to patient including: PCP  Specialist  When to call 911. The patient agrees to contact the PCP office for questions related to their healthcare. Advance Care Planning:   Does patient have an Advance Directive: decision maker updated.     Medication reconciliation was performed with patient, who verbalizes understanding of administration of home medications. Medications reviewed, 1111F entered: N/A    Was patient discharged with a pulse oximeter? no    Non-face-to-face services provided:  Obtained and reviewed discharge summary and/or continuity of care documents    Offered patient enrollment in the Remote Patient Monitoring (RPM) program for in-home monitoring: NA.    Care Transitions 24 Hour Call    Do you have a copy of your discharge instructions?: Yes  Do you have all of your prescriptions and are they filled?: Yes  Have you been contacted by a [employer] [address]?: No  Have you scheduled your follow up appointment?: Yes (Comment: PCP 3/3/2023)  How are you going to get to your appointment?: Car - family or friend to transport  Do you have support at home?: Partner/Spouse/SO  Do you feel like you have everything you need to keep you well at home?: Yes  Are you an active caregiver in your home?: No  Care Transitions Interventions         Discussed follow-up appointments. If no appointment was previously scheduled, appointment scheduling offered: Yes. Is follow up appointment scheduled within 7 days of discharge? Yes. Follow Up  Future Appointments   Date Time Provider Brian Perry   3/3/2023  9:00 AM Eryn Gordon MD [facility] AMB        Goals Addressed                      This Visit's Progress      Attends follow up appointments on schedule (pt-stated)         Patient states will attended follow up appointment with PCP 3/3/2033 and follow all recommendations and restrictions               LPN Care Coordinator provided contact information. Plan for follow-up call in 5-7 days based on severity of symptoms and risk factors. Plan for next call: self management-diet, hydration, medication compliance, fall and safety precautions and follow up appointment.     Juanis Orta LPN

## 2023-02-28 NOTE — TELEPHONE ENCOUNTER
Care Transitions Initial Follow Up Call    Outreach made within 2 business days of discharge: Yes    Patient: Linda Ramirez. Patient : 1965   MRN: [id number]  Reason for Admission: Acute Ischemic Stroke  Discharge Date: 23       Spoke with: John Feng (patient)    Discharge department/facility: Home    TCM Interactive Patient Contact:  Was patient able to fill all prescriptions: Yes  Was patient instructed to bring all medications to the follow-up visit: Yes  Is patient taking all medications as directed in the discharge summary?  Yes  Does patient understand their discharge instructions: Yes  Does patient have questions or concerns that need addressed prior to 7-14 day follow up office visit: no    Scheduled appointment with PCP within 7-14 days    Follow Up  Future Appointments   Date Time Provider Brian Perry   3/1/2023  3:15 PM [facility]       Santiago Houston MA

## 2023-03-01 ENCOUNTER — OFFICE VISIT (OUTPATIENT)
Dept: INTERNAL MEDICINE CLINIC | Facility: CLINIC | Age: 58
End: 2023-03-01
Payer: COMMERCIAL

## 2023-03-01 VITALS
BODY MASS INDEX: 40.6 KG/M2 | DIASTOLIC BLOOD PRESSURE: 80 MMHG | HEIGHT: 71 IN | RESPIRATION RATE: 16 BRPM | SYSTOLIC BLOOD PRESSURE: 130 MMHG | OXYGEN SATURATION: 97 % | WEIGHT: 290 LBS | HEART RATE: 76 BPM

## 2023-03-01 DIAGNOSIS — E11.9 CONTROLLED TYPE 2 DIABETES MELLITUS WITHOUT COMPLICATION, WITHOUT LONG-TERM CURRENT USE OF INSULIN (HCC): ICD-10-CM

## 2023-03-01 DIAGNOSIS — D69.6 THROMBOCYTOPENIA (HCC): ICD-10-CM

## 2023-03-01 DIAGNOSIS — I63.81 LACUNAR INFARCTION (HCC): Primary | ICD-10-CM

## 2023-03-01 DIAGNOSIS — I85.00 ESOPHAGEAL AND GASTRIC VARICES (HCC): ICD-10-CM

## 2023-03-01 DIAGNOSIS — Z09 HOSPITAL DISCHARGE FOLLOW-UP: ICD-10-CM

## 2023-03-01 DIAGNOSIS — K75.81 LIVER CIRRHOSIS SECONDARY TO NASH (NONALCOHOLIC STEATOHEPATITIS) (HCC): ICD-10-CM

## 2023-03-01 DIAGNOSIS — K74.60 LIVER CIRRHOSIS SECONDARY TO NASH (NONALCOHOLIC STEATOHEPATITIS) (HCC): ICD-10-CM

## 2023-03-01 DIAGNOSIS — I86.4 ESOPHAGEAL AND GASTRIC VARICES (HCC): ICD-10-CM

## 2023-03-01 DIAGNOSIS — G47.00 INSOMNIA, UNSPECIFIED TYPE: ICD-10-CM

## 2023-03-01 PROCEDURE — 3075F SYST BP GE 130 - 139MM HG: CPT | Performed by: INTERNAL MEDICINE

## 2023-03-01 PROCEDURE — 3044F HG A1C LEVEL LT 7.0%: CPT | Performed by: INTERNAL MEDICINE

## 2023-03-01 PROCEDURE — 1111F DSCHRG MED/CURRENT MED MERGE: CPT | Performed by: INTERNAL MEDICINE

## 2023-03-01 PROCEDURE — 3079F DIAST BP 80-89 MM HG: CPT | Performed by: INTERNAL MEDICINE

## 2023-03-01 PROCEDURE — 99214 OFFICE O/P EST MOD 30 MIN: CPT | Performed by: INTERNAL MEDICINE

## 2023-03-01 RX ORDER — CLOPIDOGREL BISULFATE 75 MG/1
75 TABLET ORAL DAILY
Qty: 90 TABLET | Refills: 1 | Status: SHIPPED | OUTPATIENT
Start: 2023-03-01

## 2023-03-01 RX ORDER — PANTOPRAZOLE SODIUM 40 MG/1
40 TABLET, DELAYED RELEASE ORAL
Qty: 90 TABLET | Refills: 1 | Status: SHIPPED | OUTPATIENT
Start: 2023-03-01

## 2023-03-01 ASSESSMENT — ENCOUNTER SYMPTOMS
SHORTNESS OF BREATH: 0
ABDOMINAL PAIN: 0

## 2023-03-01 ASSESSMENT — PATIENT HEALTH QUESTIONNAIRE - PHQ9
SUM OF ALL RESPONSES TO PHQ QUESTIONS 1-9: 0
SUM OF ALL RESPONSES TO PHQ QUESTIONS 1-9: 0
SUM OF ALL RESPONSES TO PHQ9 QUESTIONS 1 & 2: 0
SUM OF ALL RESPONSES TO PHQ QUESTIONS 1-9: 0
7. TROUBLE CONCENTRATING ON THINGS, SUCH AS READING THE NEWSPAPER OR WATCHING TELEVISION: 0
5. POOR APPETITE OR OVEREATING: 0
1. LITTLE INTEREST OR PLEASURE IN DOING THINGS: 0
3. TROUBLE FALLING OR STAYING ASLEEP: 0
9. THOUGHTS THAT YOU WOULD BE BETTER OFF DEAD, OR OF HURTING YOURSELF: 0
4. FEELING TIRED OR HAVING LITTLE ENERGY: 0
8. MOVING OR SPEAKING SO SLOWLY THAT OTHER PEOPLE COULD HAVE NOTICED. OR THE OPPOSITE, BEING SO FIGETY OR RESTLESS THAT YOU HAVE BEEN MOVING AROUND A LOT MORE THAN USUAL: 0
SUM OF ALL RESPONSES TO PHQ QUESTIONS 1-9: 0
10. IF YOU CHECKED OFF ANY PROBLEMS, HOW DIFFICULT HAVE THESE PROBLEMS MADE IT FOR YOU TO DO YOUR WORK, TAKE CARE OF THINGS AT HOME, OR GET ALONG WITH OTHER PEOPLE: 0
6. FEELING BAD ABOUT YOURSELF - OR THAT YOU ARE A FAILURE OR HAVE LET YOURSELF OR YOUR FAMILY DOWN: 0
DEPRESSION UNABLE TO ASSESS: PT REFUSES
2. FEELING DOWN, DEPRESSED OR HOPELESS: 0

## 2023-03-01 NOTE — PROGRESS NOTES
SUBJECTIVE:   Juana Samaniego is a 65 y.o. male seen for a visit regarding   Chief Complaint   Patient presents with    Follow-Up from Hospital     Was admitted 02/25/2023 for Acute ischemic stroke and discharged on 02/27/2023         Other  This is a new problem. The current episode started in the past 7 days (admitted 2/25-27 [facility] with stroke symptoms -had transition call w/in 2 d DC-had driving issue possibly on Fri pm; Sat had speech slur, right arm weak; sx fluctuated). The problem has been resolved (sx resolved at ER -CTA done ok ; MRI next day had possible lacune left temporal lobe-back to work yesyerday-limited activity-no residual sx). Pertinent negatives include no abdominal pain or weakness. Past Medical History, Past Surgical History, Family history, Social History, and Medications were all reviewed with the patient today and updated as necessary.        Current Outpatient Medications   Medication Sig Dispense Refill    aspirin 81 MG EC tablet Take 1 tablet by mouth daily 30 tablet 3    lactulose (CHRONULAC) 10 GM/15ML solution Take 20 g by mouth 2 times daily      MELATONIN PO Take by mouth nightly as needed Dosage unknown      diphenhydrAMINE (BENADRYL) 25 MG capsule Take 50 mg by mouth nightly as needed for Itching Was taking to help sleep but stopped when remeron started      CPAP Machine MISC by Does not apply route at bedtime      lisinopril (PRINIVIL;ZESTRIL) 10 MG tablet Take 1 tablet by mouth daily 90 tablet 3    esomeprazole Magnesium (NEXIUM) 40 MG PACK Take 40 mg by mouth daily      atorvastatin (LIPITOR) 10 MG tablet TAKE 1 TABLET BY MOUTH EVERY DAY (Patient taking differently: Take 10 mg by mouth every morning) 90 tablet 3    blood glucose test strips (RELION PREMIER TEST) strip Testing daily as directed      Blood Glucose Monitoring Suppl (RELION PRIME MONITOR) LES by Does not apply route      metFORMIN (GLUCOPHAGE-XR) 500 MG extended release tablet Take 1 tablet by mouth daily TAKE 1 TABLET BY MOUTH EVERY DAY WITH DINNER 90 tablet 3    fluticasone (FLONASE) 50 MCG/ACT nasal spray 2 sprays by Nasal route as needed As needed      nadolol (CORGARD) 40 MG tablet Take 40 mg by mouth nightly      acetaminophen (TYLENOL) 500 MG tablet Take 1 tablet by mouth every 6 hours as needed for Pain or Fever No more than 2g tylenol a day ideally (Patient not taking: Reported on 3/1/2023) 30 tablet 1    vitamin B-12 (CYANOCOBALAMIN) 1000 MCG tablet Take 1,000 mcg by mouth daily (Patient not taking: Reported on 3/1/2023)       No current facility-administered medications for this visit. No Known Allergies  Patient Active Problem List   Diagnosis    Obstructive sleep apnea on CPAP    Osteoarthritis of left knee    Tear of lateral meniscus of left knee    Right knee pain    Controlled type 2 diabetes mellitus without complication, without long-term current use of insulin (HCC)    Thrombocytopenia (HCC)    Allergic rhinitis    Liver cirrhosis secondary to CANNON (nonalcoholic steatohepatitis) (HCC)    Esophageal and gastric varices (HCC)    Gastroesophageal reflux disease without esophagitis    Hoarseness    Severe obesity (BMI 35.0-39. 9) with comorbidity ([address]    Right shoulder pain    Mild depression    Complex tear of medial meniscus of left knee    Fatty infiltration of liver    Anxiety    Chondromalacia of right knee    Hypercholesterolemia    Chondromalacia of patellofemoral joint, right    Chondromalacia of patellofemoral joint, left    Essential hypertension    Acute ischemic stroke (HCC)     Social History     Tobacco Use    Smoking status: Never    Smokeless tobacco: Never   Substance Use Topics    Alcohol use: No          Review of Systems   Respiratory:  Negative for shortness of breath. Cardiovascular:  Negative for palpitations. Cardiac echo-nl EF ; no shunt   Gastrointestinal:  Negative for abdominal pain.         Ammonia was 32-ok   Neurological:  Positive for tremors (slight noted). Negative for speech difficulty and weakness. Hematological:         Plt 68k as before ; wbc low as before        OBJECTIVE:  /80   Pulse 76   Resp 16   Ht 5' 11\" (1.803 m)   Wt 290 lb (131.5 kg)   SpO2 97%   BMI 40.45 kg/m²      Physical Exam  Neurological:      Cranial Nerves: No cranial nerve deficit. Comments: Maybe right biceps reflex slight increase over left-speech fluent - equal          Medical problems and test results were reviewed with the patient today. ASSESSMENT and PLAN     1. Lacunar infarction ([address]  2. Liver cirrhosis secondary to CANNON (nonalcoholic steatohepatitis) ([address]  3. Controlled type 2 diabetes mellitus without complication, without long-term current use of insulin (HCC)  4. Esophageal and gastric varices ([address]  5. Thrombocytopenia ([address]  6. Insomnia, unspecified type   Placed on baby ASA -not DAPT due to the liver issue, varices; lipitor was continued , currently 10 mg qd-I favor plavix use rather than ASA due to varices and change the esomeprazole to pantoprazole due to interaction; this was likely microvascular issue  lab results and schedule for future lab studies reviewed with patient  reviewed medications and side effects in detail   radiology results and schedule of future radiology studies reviewed with patient     No follow-ups on file.

## 2023-03-03 NOTE — TELEPHONE ENCOUNTER
Nutrition Counseling: Called pt regarding Dr. Addison Severin referral for nutrition counseling. Pt is interested, and would like insurance checked. Explained self-pay rates. RD will contact pt when insurance check is complete to provide coverage details and schedule appt.     Rani Mehta, [address] [place] [address],   Outpatient Dietitian  Office: [phone]  Cell: [phone] No

## 2023-03-06 ENCOUNTER — CARE COORDINATION (OUTPATIENT)
Dept: CARE COORDINATION | Facility: CLINIC | Age: 58
End: 2023-03-06

## 2023-03-06 NOTE — CARE COORDINATION
Care Transitions Outreach Attempt    Call within 2 business days of discharge: Yes   Attempted to reach patient for transitions of care follow up. Unable to reach patient. Will attempt to contact patient next business day. Patient: Mariana Lomeli. Patient : 1965 MRN: [id number]    Last Discharge [address]       Date Complaint Diagnosis Description Type Department Provider    23 hypoglycemic Dysarthria . .. ED to Hosp-Admission (Discharged) (ADMITTED) [id number] Gloria Mota MD; Mahsa Degroot ... Was this an external facility discharge?  No Discharge Facility: [address] [place] [address]    Noted following upcoming appointments from discharge chart review:   [facility] follow up appointment(s):   Future Appointments   Date Time Provider Brian Vicky   2023  9:30 AM MD MACI OlivasMIM GVL EN     Non-[facility] follow up appointment(s): n/a

## 2023-03-07 ENCOUNTER — CARE COORDINATION (OUTPATIENT)
Dept: CARE COORDINATION | Facility: CLINIC | Age: 58
End: 2023-03-07

## 2023-03-07 NOTE — CARE COORDINATION
[facility] Care Transitions Follow Up Call    Patient Current Location:  Home: [place] [address] [address]    [facility] Care Coordinator contacted the patient by telephone to follow up after admission on 2023. Verified name and  with patient as identifiers. Patient: Kelle Owen. Patient : 1965   MRN: [id number]  Reason for Admission: Acute Ischemic Stroke  Discharge Date: 23 RARS: Readmission Risk Score: 13.8      Needs to be reviewed by the provider   Additional needs identified to be addressed with provider: No  none             Method of communication with provider: none. Spoke with patient states fine. Patient denies any shortness of breath or weakness. Patient states attended PCP appointment on 3/1/2023. Patient states has returned by to work. This Care Coordinator will graduate this patient from this program.    Addressed changes since last contact:  none  Discussed follow-up appointments. If no appointment was previously scheduled, appointment scheduling offered: Yes. Is follow up appointment scheduled within 7 days of discharge? Yes. Follow Up  Future Appointments   Date Time Provider Brian Perry   2023  9:30 AM Andrew Guzman MD MLMIM [facility]     Non-[facility] follow up appointment(s): n/a    LPN Care Coordinator reviewed medical action plan with patient and discussed any barriers to care and/or understanding of plan of care after discharge. Discussed appropriate site of care based on symptoms and resources available to patient including: PCP  Specialist  When to call 911. The patient agrees to contact the PCP office for questions related to their healthcare. Advance Care Planning:   decision maker updated.      Patients top risk factors for readmission:  none  Interventions to address risk factors: Assessment and support for treatment adherence and medication management-     Offered patient enrollment in the Remote Patient Monitoring (RPM) program for in-home monitoring: NA.     Care Transitions Subsequent and Final Call    Subsequent and Final Calls  Do you have any ongoing symptoms?: No  Have your medications changed?: Yes  Patient Reports: Patient report PCP changed aspirin to Plavix and Nexium to Protonix  Do you have any questions related to your medications?: No  Do you currently have any active services?: No  Do you have any needs or concerns that I can assist you with?: No  Identified Barriers: None  Care Transitions Interventions  Other Interventions:              Goals Addressed                      This Visit's Progress      Attends follow up appointments on schedule (pt-stated)   On track      Patient states will attended follow up appointment with PCP 3/3/2033 and follow all recommendations and restrictions               LPN Care Coordinator provided contact information for future needs. No further follow-up call indicated based on severity of symptoms and risk factors.       Edis Lau LPN

## 2023-03-21 ENCOUNTER — TELEPHONE (OUTPATIENT)
Dept: INTERNAL MEDICINE CLINIC | Facility: CLINIC | Age: 58
End: 2023-03-21

## 2023-03-21 ENCOUNTER — OFFICE VISIT (OUTPATIENT)
Dept: INTERNAL MEDICINE CLINIC | Facility: CLINIC | Age: 58
End: 2023-03-21
Payer: COMMERCIAL

## 2023-03-21 VITALS
HEART RATE: 70 BPM | SYSTOLIC BLOOD PRESSURE: 123 MMHG | WEIGHT: 298 LBS | TEMPERATURE: 98.1 F | RESPIRATION RATE: 18 BRPM | OXYGEN SATURATION: 95 % | HEIGHT: 71 IN | DIASTOLIC BLOOD PRESSURE: 58 MMHG | BODY MASS INDEX: 41.72 KG/M2

## 2023-03-21 DIAGNOSIS — K75.81 LIVER CIRRHOSIS SECONDARY TO NASH (NONALCOHOLIC STEATOHEPATITIS) (HCC): ICD-10-CM

## 2023-03-21 DIAGNOSIS — R06.09 DYSPNEA ON EXERTION: ICD-10-CM

## 2023-03-21 DIAGNOSIS — K74.60 LIVER CIRRHOSIS SECONDARY TO NASH (NONALCOHOLIC STEATOHEPATITIS) (HCC): ICD-10-CM

## 2023-03-21 DIAGNOSIS — K75.81 LIVER CIRRHOSIS SECONDARY TO NASH (NONALCOHOLIC STEATOHEPATITIS) (HCC): Primary | ICD-10-CM

## 2023-03-21 DIAGNOSIS — R60.9 EDEMA, UNSPECIFIED TYPE: ICD-10-CM

## 2023-03-21 DIAGNOSIS — K74.60 LIVER CIRRHOSIS SECONDARY TO NASH (NONALCOHOLIC STEATOHEPATITIS) (HCC): Primary | ICD-10-CM

## 2023-03-21 LAB
BASOPHILS # BLD: 0.1 K/UL (ref 0–0.2)
BASOPHILS NFR BLD: 2 % (ref 0–2)
DIFFERENTIAL METHOD BLD: ABNORMAL
EOSINOPHIL # BLD: 0.3 K/UL (ref 0–0.8)
EOSINOPHIL NFR BLD: 8 % (ref 0.5–7.8)
ERYTHROCYTE [DISTWIDTH] IN BLOOD BY AUTOMATED COUNT: 16.1 % (ref 11.9–14.6)
HCT VFR BLD AUTO: 32.2 % (ref 41.1–50.3)
HGB BLD-MCNC: 10.6 G/DL (ref 13.6–17.2)
IMM GRANULOCYTES # BLD AUTO: 0 K/UL (ref 0–0.5)
IMM GRANULOCYTES NFR BLD AUTO: 0 % (ref 0–5)
LYMPHOCYTES # BLD: 0.6 K/UL (ref 0.5–4.6)
LYMPHOCYTES NFR BLD: 18 % (ref 13–44)
MCH RBC QN AUTO: 30.5 PG (ref 26.1–32.9)
MCHC RBC AUTO-ENTMCNC: 32.9 G/DL (ref 31.4–35)
MCV RBC AUTO: 92.8 FL (ref 82–102)
MONOCYTES # BLD: 0.4 K/UL (ref 0.1–1.3)
MONOCYTES NFR BLD: 11 % (ref 4–12)
NEUTS SEG # BLD: 2 K/UL (ref 1.7–8.2)
NEUTS SEG NFR BLD: 61 % (ref 43–78)
NRBC # BLD: 0 K/UL (ref 0–0.2)
PLATELET # BLD AUTO: 70 K/UL (ref 150–450)
PLATELET COMMENT: ABNORMAL
PMV BLD AUTO: 11.1 FL (ref 9.4–12.3)
RBC # BLD AUTO: 3.47 M/UL (ref 4.23–5.6)
RBC MORPH BLD: ABNORMAL
RBC MORPH BLD: ABNORMAL
WBC # BLD AUTO: 3.4 K/UL (ref 4.3–11.1)
WBC MORPH BLD: ABNORMAL

## 2023-03-21 PROCEDURE — 99214 OFFICE O/P EST MOD 30 MIN: CPT | Performed by: INTERNAL MEDICINE

## 2023-03-21 PROCEDURE — 3078F DIAST BP <80 MM HG: CPT | Performed by: INTERNAL MEDICINE

## 2023-03-21 PROCEDURE — 3074F SYST BP LT 130 MM HG: CPT | Performed by: INTERNAL MEDICINE

## 2023-03-21 RX ORDER — SPIRONOLACTONE 25 MG/1
25 TABLET ORAL DAILY
Qty: 90 TABLET | Refills: 3 | Status: SHIPPED | OUTPATIENT
Start: 2023-03-21

## 2023-03-21 RX ORDER — VENLAFAXINE 75 MG/1
75 TABLET ORAL 3 TIMES DAILY
COMMUNITY

## 2023-03-21 RX ORDER — FUROSEMIDE 40 MG/1
40 TABLET ORAL DAILY PRN
Qty: 90 TABLET | Refills: 1 | Status: SHIPPED | OUTPATIENT
Start: 2023-03-21

## 2023-03-21 ASSESSMENT — PATIENT HEALTH QUESTIONNAIRE - PHQ9
1. LITTLE INTEREST OR PLEASURE IN DOING THINGS: 3
SUM OF ALL RESPONSES TO PHQ9 QUESTIONS 1 & 2: 3
5. POOR APPETITE OR OVEREATING: 1
SUM OF ALL RESPONSES TO PHQ QUESTIONS 1-9: 8
10. IF YOU CHECKED OFF ANY PROBLEMS, HOW DIFFICULT HAVE THESE PROBLEMS MADE IT FOR YOU TO DO YOUR WORK, TAKE CARE OF THINGS AT HOME, OR GET ALONG WITH OTHER PEOPLE: 1
SUM OF ALL RESPONSES TO PHQ QUESTIONS 1-9: 8
8. MOVING OR SPEAKING SO SLOWLY THAT OTHER PEOPLE COULD HAVE NOTICED. OR THE OPPOSITE, BEING SO FIGETY OR RESTLESS THAT YOU HAVE BEEN MOVING AROUND A LOT MORE THAN USUAL: 0
3. TROUBLE FALLING OR STAYING ASLEEP: 0
SUM OF ALL RESPONSES TO PHQ QUESTIONS 1-9: 8
7. TROUBLE CONCENTRATING ON THINGS, SUCH AS READING THE NEWSPAPER OR WATCHING TELEVISION: 1
SUM OF ALL RESPONSES TO PHQ QUESTIONS 1-9: 8
4. FEELING TIRED OR HAVING LITTLE ENERGY: 3
2. FEELING DOWN, DEPRESSED OR HOPELESS: 0
9. THOUGHTS THAT YOU WOULD BE BETTER OFF DEAD, OR OF HURTING YOURSELF: 0
6. FEELING BAD ABOUT YOURSELF - OR THAT YOU ARE A FAILURE OR HAVE LET YOURSELF OR YOUR FAMILY DOWN: 0

## 2023-03-21 ASSESSMENT — ENCOUNTER SYMPTOMS
SHORTNESS OF BREATH: 1
ORTHOPNEA: 0
WHEEZING: 0
COUGH: 0
SPUTUM PRODUCTION: 0

## 2023-03-21 NOTE — TELEPHONE ENCOUNTER
Pt called back and given appt for today with Dr. Yaz Dumont. States swelling has been going on for over a week.

## 2023-03-21 NOTE — TELEPHONE ENCOUNTER
Karissa Riley called concerning  his feet, legs swelling and loosing his breath. He's a diabetic and had a stroke not long ago. Also can't work as normal. I went to talk to [facility] and she was in a room. I spoke with Christopher Ferrari she advised me to tell patient to go to  urgent care.

## 2023-03-21 NOTE — PROGRESS NOTES
clinical-related to cirrhosis rather than heart -echo was ok; check Hb also-used aldactone qd -use lasix x2 d then qod --1-2 lb per d; see 2 week also  lab results and schedule for future lab studies reviewed with patient  reviewed medications and side effects in detail     Return in about 2 weeks (around 4/4/2023) for For medication assessment.
S/P Myringotomy with Insertion  Procedure X 2, 2004,2008

## 2023-03-22 LAB
ANION GAP SERPL CALC-SCNC: 5 MMOL/L (ref 2–11)
BUN SERPL-MCNC: 13 MG/DL (ref 6–23)
CALCIUM SERPL-MCNC: 8.1 MG/DL (ref 8.3–10.4)
CHLORIDE SERPL-SCNC: 110 MMOL/L (ref 101–110)
CO2 SERPL-SCNC: 25 MMOL/L (ref 21–32)
CREAT SERPL-MCNC: 0.9 MG/DL (ref 0.8–1.5)
GLUCOSE SERPL-MCNC: 145 MG/DL (ref 65–100)
NT PRO BNP: 219 PG/ML (ref 5–125)
POTASSIUM SERPL-SCNC: 4.2 MMOL/L (ref 3.5–5.1)
SODIUM SERPL-SCNC: 140 MMOL/L (ref 133–143)

## 2023-04-04 ENCOUNTER — OFFICE VISIT (OUTPATIENT)
Dept: INTERNAL MEDICINE CLINIC | Facility: CLINIC | Age: 58
End: 2023-04-04
Payer: COMMERCIAL

## 2023-04-04 VITALS
HEIGHT: 71 IN | OXYGEN SATURATION: 96 % | HEART RATE: 59 BPM | SYSTOLIC BLOOD PRESSURE: 122 MMHG | DIASTOLIC BLOOD PRESSURE: 70 MMHG | BODY MASS INDEX: 38.92 KG/M2 | WEIGHT: 278 LBS

## 2023-04-04 DIAGNOSIS — R60.9 EDEMA, UNSPECIFIED TYPE: ICD-10-CM

## 2023-04-04 DIAGNOSIS — K74.60 LIVER CIRRHOSIS SECONDARY TO NASH (NONALCOHOLIC STEATOHEPATITIS) (HCC): ICD-10-CM

## 2023-04-04 DIAGNOSIS — R60.9 EDEMA, UNSPECIFIED TYPE: Primary | ICD-10-CM

## 2023-04-04 DIAGNOSIS — K75.81 LIVER CIRRHOSIS SECONDARY TO NASH (NONALCOHOLIC STEATOHEPATITIS) (HCC): ICD-10-CM

## 2023-04-04 LAB
ANION GAP SERPL CALC-SCNC: 4 MMOL/L (ref 2–11)
BUN SERPL-MCNC: 11 MG/DL (ref 6–23)
CALCIUM SERPL-MCNC: 8.6 MG/DL (ref 8.3–10.4)
CHLORIDE SERPL-SCNC: 112 MMOL/L (ref 101–110)
CO2 SERPL-SCNC: 25 MMOL/L (ref 21–32)
CREAT SERPL-MCNC: 0.8 MG/DL (ref 0.8–1.5)
GLUCOSE SERPL-MCNC: 151 MG/DL (ref 65–100)
POTASSIUM SERPL-SCNC: 3.9 MMOL/L (ref 3.5–5.1)
SODIUM SERPL-SCNC: 141 MMOL/L (ref 133–143)

## 2023-04-04 PROCEDURE — 99213 OFFICE O/P EST LOW 20 MIN: CPT | Performed by: INTERNAL MEDICINE

## 2023-04-04 PROCEDURE — 3078F DIAST BP <80 MM HG: CPT | Performed by: INTERNAL MEDICINE

## 2023-04-04 PROCEDURE — 3074F SYST BP LT 130 MM HG: CPT | Performed by: INTERNAL MEDICINE

## 2023-04-04 ASSESSMENT — PATIENT HEALTH QUESTIONNAIRE - PHQ9
SUM OF ALL RESPONSES TO PHQ QUESTIONS 1-9: 5
1. LITTLE INTEREST OR PLEASURE IN DOING THINGS: 0
SUM OF ALL RESPONSES TO PHQ QUESTIONS 1-9: 5
8. MOVING OR SPEAKING SO SLOWLY THAT OTHER PEOPLE COULD HAVE NOTICED. OR THE OPPOSITE, BEING SO FIGETY OR RESTLESS THAT YOU HAVE BEEN MOVING AROUND A LOT MORE THAN USUAL: 0
10. IF YOU CHECKED OFF ANY PROBLEMS, HOW DIFFICULT HAVE THESE PROBLEMS MADE IT FOR YOU TO DO YOUR WORK, TAKE CARE OF THINGS AT HOME, OR GET ALONG WITH OTHER PEOPLE: 1
6. FEELING BAD ABOUT YOURSELF - OR THAT YOU ARE A FAILURE OR HAVE LET YOURSELF OR YOUR FAMILY DOWN: 0
5. POOR APPETITE OR OVEREATING: 1
3. TROUBLE FALLING OR STAYING ASLEEP: 2
7. TROUBLE CONCENTRATING ON THINGS, SUCH AS READING THE NEWSPAPER OR WATCHING TELEVISION: 0
SUM OF ALL RESPONSES TO PHQ9 QUESTIONS 1 & 2: 0
2. FEELING DOWN, DEPRESSED OR HOPELESS: 0
4. FEELING TIRED OR HAVING LITTLE ENERGY: 2
9. THOUGHTS THAT YOU WOULD BE BETTER OFF DEAD, OR OF HURTING YOURSELF: 0

## 2023-04-04 ASSESSMENT — ENCOUNTER SYMPTOMS
COUGH: 1
WHEEZING: 0
SHORTNESS OF BREATH: 1

## 2023-04-04 NOTE — PROGRESS NOTES
ischemic stroke [facility])     Social History     Tobacco Use    Smoking status: Never    Smokeless tobacco: Never   Substance Use Topics    Alcohol use: No          Review of Systems   Respiratory:  Positive for cough (some dry cough) and shortness of breath. Negative for wheezing. Cardiovascular:  Positive for leg swelling. OBJECTIVE:  /70 (Site: Left Upper Arm, Position: Sitting, Cuff Size: Large Adult)   Pulse 59   Ht 5' 11\" (1.803 m)   Wt 278 lb (126.1 kg)   SpO2 96%   BMI 38.77 kg/m²      Physical Exam  Cardiovascular:      Rate and Rhythm: Normal rate and regular rhythm. Pulmonary:      Breath sounds: No wheezing or rales. Musculoskeletal:         General: Swelling (legs softer; edema about 1 plus up leg to mid tibia) present. Medical problems and test results were reviewed with the patient today. ASSESSMENT and PLAN     1. Edema, unspecified type  -     Basic Metabolic Panel; Future  2. Liver cirrhosis secondary to CANNON (nonalcoholic steatohepatitis) (HCC)   BP ok ; weight/fluid down nicely--if loses 5 more then cut to 3x per week from qod lasix--adjust at home if gains back    lab results and schedule for future lab studies reviewed with patient  reviewed medications and side effects in detail     Return keep July.

## 2023-08-21 DIAGNOSIS — I63.81 LACUNAR INFARCTION (HCC): ICD-10-CM

## 2023-08-21 RX ORDER — CLOPIDOGREL BISULFATE 75 MG/1
TABLET ORAL
Qty: 90 TABLET | Refills: 3 | Status: SHIPPED | OUTPATIENT
Start: 2023-08-21

## 2023-08-21 RX ORDER — ATORVASTATIN CALCIUM 10 MG/1
10 TABLET, FILM COATED ORAL EVERY MORNING
Qty: 90 TABLET | Refills: 3 | Status: SHIPPED | OUTPATIENT
Start: 2023-08-21

## 2023-09-04 DIAGNOSIS — R60.9 EDEMA, UNSPECIFIED TYPE: ICD-10-CM

## 2023-09-05 RX ORDER — FUROSEMIDE 40 MG/1
40 TABLET ORAL DAILY PRN
Qty: 90 TABLET | Refills: 1 | OUTPATIENT
Start: 2023-09-05

## 2023-11-22 DIAGNOSIS — T14.8XXA BRUISING: Primary | ICD-10-CM

## 2023-11-22 DIAGNOSIS — T14.8XXA BRUISING: ICD-10-CM

## 2023-11-22 LAB
APTT PPP: 33.3 SEC (ref 24.5–34.2)
BASOPHILS # BLD: 0.1 K/UL (ref 0–0.2)
BASOPHILS NFR BLD: 2 % (ref 0–2)
DIFFERENTIAL METHOD BLD: ABNORMAL
EOSINOPHIL # BLD: 0.2 K/UL (ref 0–0.8)
EOSINOPHIL NFR BLD: 6 % (ref 0.5–7.8)
ERYTHROCYTE [DISTWIDTH] IN BLOOD BY AUTOMATED COUNT: 17.2 % (ref 11.9–14.6)
HCT VFR BLD AUTO: 35.4 % (ref 41.1–50.3)
HGB BLD-MCNC: 11.6 G/DL (ref 13.6–17.2)
IMM GRANULOCYTES # BLD AUTO: 0 K/UL (ref 0–0.5)
IMM GRANULOCYTES NFR BLD AUTO: 0 % (ref 0–5)
INR PPP: 1.3
LYMPHOCYTES # BLD: 0.7 K/UL (ref 0.5–4.6)
LYMPHOCYTES NFR BLD: 26 % (ref 13–44)
MCH RBC QN AUTO: 31.3 PG (ref 26.1–32.9)
MCHC RBC AUTO-ENTMCNC: 32.8 G/DL (ref 31.4–35)
MCV RBC AUTO: 95.4 FL (ref 82–102)
MONOCYTES # BLD: 0.2 K/UL (ref 0.1–1.3)
MONOCYTES NFR BLD: 9 % (ref 4–12)
NEUTS SEG # BLD: 1.5 K/UL (ref 1.7–8.2)
NEUTS SEG NFR BLD: 56 % (ref 43–78)
NRBC # BLD: 0 K/UL (ref 0–0.2)
PLATELET # BLD AUTO: 82 K/UL (ref 150–450)
PMV BLD AUTO: 11.8 FL (ref 9.4–12.3)
PROTHROMBIN TIME: 16.1 SEC (ref 12.6–14.3)
RBC # BLD AUTO: 3.71 M/UL (ref 4.23–5.6)
WBC # BLD AUTO: 2.7 K/UL (ref 4.3–11.1)

## 2023-11-26 DIAGNOSIS — I10 ESSENTIAL (PRIMARY) HYPERTENSION: ICD-10-CM

## 2023-11-26 DIAGNOSIS — F32.0 MAJOR DEPRESSIVE DISORDER, SINGLE EPISODE, MILD (HCC): ICD-10-CM

## 2023-11-26 DIAGNOSIS — R60.9 EDEMA, UNSPECIFIED TYPE: ICD-10-CM

## 2023-11-27 RX ORDER — LISINOPRIL 10 MG/1
10 TABLET ORAL DAILY
Qty: 90 TABLET | Refills: 3 | Status: SHIPPED | OUTPATIENT
Start: 2023-11-27

## 2023-11-27 RX ORDER — FUROSEMIDE 40 MG/1
40 TABLET ORAL DAILY PRN
Qty: 90 TABLET | Refills: 1 | Status: SHIPPED | OUTPATIENT
Start: 2023-11-27

## 2023-11-27 RX ORDER — VENLAFAXINE HYDROCHLORIDE 75 MG/1
CAPSULE, EXTENDED RELEASE ORAL DAILY
Qty: 90 CAPSULE | Refills: 3 | OUTPATIENT
Start: 2023-11-27

## 2023-12-13 DIAGNOSIS — F32.0 MAJOR DEPRESSIVE DISORDER, SINGLE EPISODE, MILD (HCC): ICD-10-CM

## 2023-12-13 RX ORDER — VENLAFAXINE HYDROCHLORIDE 75 MG/1
CAPSULE, EXTENDED RELEASE ORAL DAILY
Qty: 90 CAPSULE | Refills: 3 | OUTPATIENT
Start: 2023-12-13

## 2023-12-26 RX ORDER — VENLAFAXINE 75 MG/1
75 TABLET ORAL DAILY
Qty: 90 TABLET | Refills: 1 | Status: SHIPPED | OUTPATIENT
Start: 2023-12-26

## 2023-12-26 NOTE — TELEPHONE ENCOUNTER
Request for refill of venlafaxine to [facility] on [address]

## 2023-12-27 DIAGNOSIS — K75.81 LIVER CIRRHOSIS SECONDARY TO NASH (NONALCOHOLIC STEATOHEPATITIS) (HCC): ICD-10-CM

## 2023-12-27 DIAGNOSIS — K74.60 LIVER CIRRHOSIS SECONDARY TO NASH (NONALCOHOLIC STEATOHEPATITIS) (HCC): ICD-10-CM

## 2023-12-27 RX ORDER — SPIRONOLACTONE 25 MG/1
25 TABLET ORAL DAILY
Qty: 90 TABLET | Refills: 0 | Status: SHIPPED | OUTPATIENT
Start: 2023-12-27

## 2024-02-07 ENCOUNTER — OFFICE VISIT (OUTPATIENT)
Dept: INTERNAL MEDICINE CLINIC | Facility: CLINIC | Age: 59
End: 2024-02-07

## 2024-02-07 VITALS
HEART RATE: 72 BPM | BODY MASS INDEX: 38.41 KG/M2 | WEIGHT: 275.4 LBS | SYSTOLIC BLOOD PRESSURE: 118 MMHG | OXYGEN SATURATION: 98 % | DIASTOLIC BLOOD PRESSURE: 68 MMHG

## 2024-02-07 DIAGNOSIS — D64.9 ANEMIA, UNSPECIFIED TYPE: ICD-10-CM

## 2024-02-07 DIAGNOSIS — Z78.0 POSTMENOPAUSAL STATE: ICD-10-CM

## 2024-02-07 DIAGNOSIS — K75.81 LIVER CIRRHOSIS SECONDARY TO NASH (NONALCOHOLIC STEATOHEPATITIS) (HCC): ICD-10-CM

## 2024-02-07 DIAGNOSIS — I10 ESSENTIAL HYPERTENSION: ICD-10-CM

## 2024-02-07 DIAGNOSIS — S32.010A COMPRESSION FRACTURE OF L1 VERTEBRA, INITIAL ENCOUNTER (HCC): ICD-10-CM

## 2024-02-07 DIAGNOSIS — G45.9 TIA (TRANSIENT ISCHEMIC ATTACK): Primary | ICD-10-CM

## 2024-02-07 DIAGNOSIS — E55.9 VITAMIN D DEFICIENCY: ICD-10-CM

## 2024-02-07 DIAGNOSIS — K74.60 LIVER CIRRHOSIS SECONDARY TO NASH (NONALCOHOLIC STEATOHEPATITIS) (HCC): ICD-10-CM

## 2024-02-07 RX ORDER — ASPIRIN 81 MG/1
81 TABLET ORAL DAILY
COMMUNITY
Start: 2024-02-08 | End: 2025-02-02

## 2024-02-07 ASSESSMENT — PATIENT HEALTH QUESTIONNAIRE - PHQ9
9. THOUGHTS THAT YOU WOULD BE BETTER OFF DEAD, OR OF HURTING YOURSELF: 0
8. MOVING OR SPEAKING SO SLOWLY THAT OTHER PEOPLE COULD HAVE NOTICED. OR THE OPPOSITE, BEING SO FIGETY OR RESTLESS THAT YOU HAVE BEEN MOVING AROUND A LOT MORE THAN USUAL: 0
SUM OF ALL RESPONSES TO PHQ QUESTIONS 1-9: 0
3. TROUBLE FALLING OR STAYING ASLEEP: 0
1. LITTLE INTEREST OR PLEASURE IN DOING THINGS: 0
4. FEELING TIRED OR HAVING LITTLE ENERGY: 0
7. TROUBLE CONCENTRATING ON THINGS, SUCH AS READING THE NEWSPAPER OR WATCHING TELEVISION: 0
2. FEELING DOWN, DEPRESSED OR HOPELESS: 0
SUM OF ALL RESPONSES TO PHQ QUESTIONS 1-9: 0
6. FEELING BAD ABOUT YOURSELF - OR THAT YOU ARE A FAILURE OR HAVE LET YOURSELF OR YOUR FAMILY DOWN: 0
5. POOR APPETITE OR OVEREATING: 0
SUM OF ALL RESPONSES TO PHQ QUESTIONS 1-9: 0
SUM OF ALL RESPONSES TO PHQ QUESTIONS 1-9: 0

## 2024-02-07 ASSESSMENT — ENCOUNTER SYMPTOMS
SHORTNESS OF BREATH: 0
ABDOMINAL PAIN: 0
COUGH: 0

## 2024-02-07 NOTE — PROGRESS NOTES
SUBJECTIVE:   Jamison Robison Jr. is a 58 y.o. male seen for a visit regarding   Chief Complaint   Patient presents with    Follow-Up from Hospital     Hospital follow up for polyps        HPI  Was in the hospital; runs a [employer]    H/o GI bleed, Gastric polyp. Cirrhosis of Liver; CANNON - has planning for EGD next week; seeing Dr. Mcwilliams, advised to take Lasix 40mg daily; trial off spiranolactone as patient is concerned about having gynecomastia  H/o TIA - was on Plavix  Diabetes - on Metformin(stopped today), Hba1c - 5.4  T11 age-indeterminate compression fracture: Incidentally noted on CT abdomen with 20% height loss and currently without symptoms.      Past Medical History, Past Surgical History, Family history, Social History, and Medications were all reviewed with the patient today and updated as necessary.       Current Outpatient Medications   Medication Sig Dispense Refill    [START ON 2/8/2024] aspirin 81 MG EC tablet Take 1 tablet by mouth daily      venlafaxine (EFFEXOR) 75 MG tablet Take 1 tablet by mouth daily 90 tablet 1    furosemide (LASIX) 40 MG tablet TAKE 1 TABLET BY MOUTH DAILY AS NEEDED (FLUID) 90 tablet 1    lisinopril (PRINIVIL;ZESTRIL) 10 MG tablet TAKE 1 TABLET BY MOUTH EVERY DAY 90 tablet 3    atorvastatin (LIPITOR) 10 MG tablet Take 1 tablet by mouth every morning 90 tablet 3    pantoprazole (PROTONIX) 40 MG tablet Take 1 tablet by mouth daily 90 tablet 3    promethazine (PHENERGAN) 25 MG tablet Take 1 tablet by mouth every 8 hours as needed for Nausea 28 tablet 0    acetaminophen (TYLENOL) 500 MG tablet Take 1 tablet by mouth every 6 hours as needed for Pain or Fever No more than 2g tylenol a day ideally 30 tablet 1    lactulose (CHRONULAC) 10 GM/15ML solution Take 30 mLs by mouth 2 times daily      MELATONIN PO Take by mouth nightly as needed Dosage unknown      diphenhydrAMINE (BENADRYL) 25 MG capsule Take 2 capsules by mouth nightly as needed for Itching Was

## 2024-02-27 ENCOUNTER — TELEPHONE (OUTPATIENT)
Dept: INTERNAL MEDICINE CLINIC | Facility: CLINIC | Age: 59
End: 2024-02-27

## 2024-02-27 NOTE — TELEPHONE ENCOUNTER
----- Message from Paige Rodriguez sent at 2/27/2024  9:41 AM EST -----  Subject: Message to Provider    QUESTIONS  Information for Provider? Patient needs his order for lab work either   faxed to him or to [facility]. Please call for more information. Please   email order to [email or web address]   ---------------------------------------------------------------------------  --------------  CALL BACK INFO  [phone]; OK to leave message on voicemail  ---------------------------------------------------------------------------  --------------  SCRIPT ANSWERS  Relationship to Patient? Self

## 2024-02-29 LAB
ALBUMIN SERPL-MCNC: 3.4 G/DL (ref 3.8–4.9)
ALBUMIN/GLOB SERPL: 1.5 {RATIO} (ref 1.2–2.2)
ALP SERPL-CCNC: 57 IU/L (ref 44–121)
ALT SERPL-CCNC: 30 IU/L (ref 0–44)
AST SERPL-CCNC: 32 IU/L (ref 0–40)
BASOPHILS # BLD AUTO: 0 X10E3/UL (ref 0–0.2)
BASOPHILS NFR BLD AUTO: 1 %
BILIRUB SERPL-MCNC: 1.3 MG/DL (ref 0–1.2)
BUN SERPL-MCNC: 12 MG/DL (ref 6–24)
BUN/CREAT SERPL: 14 (ref 9–20)
CALCIUM SERPL-MCNC: 8.8 MG/DL (ref 8.7–10.2)
CHOLEST SERPL-MCNC: 111 MG/DL (ref 100–199)
CO2 SERPL-SCNC: 23 MMOL/L (ref 20–29)
CREAT SERPL-MCNC: 0.88 MG/DL (ref 0.76–1.27)
EGFRCR SERPLBLD CKD-EPI 2021: 100 ML/MIN/1.73
EOSINOPHIL # BLD AUTO: 0.2 X10E3/UL (ref 0–0.4)
EOSINOPHIL NFR BLD AUTO: 4 %
ERYTHROCYTE [DISTWIDTH] IN BLOOD BY AUTOMATED COUNT: 13.7 % (ref 11.6–15.4)
FERRITIN SERPL-MCNC: 40 NG/ML (ref 30–400)
FOLATE SERPL-MCNC: 8.8 NG/ML
GLOBULIN SER CALC-MCNC: 2.3 G/DL (ref 1.5–4.5)
GLUCOSE SERPL-MCNC: 127 MG/DL (ref 70–99)
HCT VFR BLD AUTO: 29.8 % (ref 37.5–51)
HDLC SERPL-MCNC: 53 MG/DL
HGB BLD-MCNC: 9.8 G/DL (ref 13–17.7)
IMM GRANULOCYTES # BLD AUTO: 0 X10E3/UL (ref 0–0.1)
IMM GRANULOCYTES NFR BLD AUTO: 0 %
IMP & REVIEW OF LAB RESULTS: NORMAL
IRON SERPL-MCNC: 60 UG/DL (ref 38–169)
LDLC SERPL CALC-MCNC: 32 MG/DL (ref 0–99)
LYMPHOCYTES # BLD AUTO: 0.9 X10E3/UL (ref 0.7–3.1)
LYMPHOCYTES NFR BLD AUTO: 22 %
MCH RBC QN AUTO: 31.9 PG (ref 26.6–33)
MCHC RBC AUTO-ENTMCNC: 32.9 G/DL (ref 31.5–35.7)
MCV RBC AUTO: 97 FL (ref 79–97)
MONOCYTES # BLD AUTO: 0.4 X10E3/UL (ref 0.1–0.9)
MONOCYTES NFR BLD AUTO: 10 %
NEUTROPHILS # BLD AUTO: 2.6 X10E3/UL (ref 1.4–7)
NEUTROPHILS NFR BLD AUTO: 63 %
PLATELET # BLD AUTO: 118 X10E3/UL (ref 150–450)
POTASSIUM SERPL-SCNC: 4.3 MMOL/L (ref 3.5–5.2)
PROT SERPL-MCNC: 5.7 G/DL (ref 6–8.5)
RBC # BLD AUTO: 3.07 X10E6/UL (ref 4.14–5.8)
SODIUM SERPL-SCNC: 141 MMOL/L (ref 134–144)
TRIGL SERPL-MCNC: 154 MG/DL (ref 0–149)
TSH SERPL DL<=0.005 MIU/L-ACNC: 0.92 UIU/ML (ref 0.45–4.5)
VIT B12 SERPL-MCNC: 715 PG/ML (ref 232–1245)
VLDLC SERPL CALC-MCNC: 26 MG/DL (ref 5–40)
WBC # BLD AUTO: 4.2 X10E3/UL (ref 3.4–10.8)

## 2024-03-11 SDOH — ECONOMIC STABILITY: FOOD INSECURITY: WITHIN THE PAST 12 MONTHS, YOU WORRIED THAT YOUR FOOD WOULD RUN OUT BEFORE YOU GOT MONEY TO BUY MORE.: PATIENT DECLINED

## 2024-03-11 SDOH — ECONOMIC STABILITY: HOUSING INSECURITY
IN THE LAST 12 MONTHS, WAS THERE A TIME WHEN YOU DID NOT HAVE A STEADY PLACE TO SLEEP OR SLEPT IN A SHELTER (INCLUDING NOW)?: PATIENT DECLINED

## 2024-03-11 SDOH — ECONOMIC STABILITY: FOOD INSECURITY: WITHIN THE PAST 12 MONTHS, THE FOOD YOU BOUGHT JUST DIDN'T LAST AND YOU DIDN'T HAVE MONEY TO GET MORE.: PATIENT DECLINED

## 2024-03-11 SDOH — ECONOMIC STABILITY: INCOME INSECURITY: HOW HARD IS IT FOR YOU TO PAY FOR THE VERY BASICS LIKE FOOD, HOUSING, MEDICAL CARE, AND HEATING?: PATIENT DECLINED

## 2024-03-11 SDOH — ECONOMIC STABILITY: TRANSPORTATION INSECURITY
IN THE PAST 12 MONTHS, HAS LACK OF TRANSPORTATION KEPT YOU FROM MEETINGS, WORK, OR FROM GETTING THINGS NEEDED FOR DAILY LIVING?: PATIENT DECLINED

## 2024-03-14 ENCOUNTER — OFFICE VISIT (OUTPATIENT)
Dept: INTERNAL MEDICINE CLINIC | Facility: CLINIC | Age: 59
End: 2024-03-14
Payer: COMMERCIAL

## 2024-03-14 VITALS
DIASTOLIC BLOOD PRESSURE: 70 MMHG | HEART RATE: 65 BPM | BODY MASS INDEX: 38.78 KG/M2 | OXYGEN SATURATION: 98 % | HEIGHT: 71 IN | SYSTOLIC BLOOD PRESSURE: 126 MMHG | WEIGHT: 277 LBS

## 2024-03-14 DIAGNOSIS — I86.4 ESOPHAGEAL AND GASTRIC VARICES (HCC): ICD-10-CM

## 2024-03-14 DIAGNOSIS — I85.00 ESOPHAGEAL AND GASTRIC VARICES (HCC): ICD-10-CM

## 2024-03-14 DIAGNOSIS — R60.9 EDEMA, UNSPECIFIED TYPE: ICD-10-CM

## 2024-03-14 DIAGNOSIS — I10 ESSENTIAL (PRIMARY) HYPERTENSION: ICD-10-CM

## 2024-03-14 PROCEDURE — 99214 OFFICE O/P EST MOD 30 MIN: CPT | Performed by: INTERNAL MEDICINE

## 2024-03-14 PROCEDURE — 3078F DIAST BP <80 MM HG: CPT | Performed by: INTERNAL MEDICINE

## 2024-03-14 PROCEDURE — 3074F SYST BP LT 130 MM HG: CPT | Performed by: INTERNAL MEDICINE

## 2024-03-14 RX ORDER — FUROSEMIDE 40 MG/1
40 TABLET ORAL DAILY PRN
Qty: 90 TABLET | Refills: 1 | Status: SHIPPED | OUTPATIENT
Start: 2024-03-14

## 2024-03-14 RX ORDER — VENLAFAXINE 75 MG/1
75 TABLET ORAL DAILY
Qty: 90 TABLET | Refills: 1 | Status: SHIPPED | OUTPATIENT
Start: 2024-03-14

## 2024-03-14 RX ORDER — ATORVASTATIN CALCIUM 10 MG/1
10 TABLET, FILM COATED ORAL EVERY MORNING
Qty: 90 TABLET | Refills: 1 | Status: SHIPPED | OUTPATIENT
Start: 2024-03-14

## 2024-03-14 RX ORDER — PANTOPRAZOLE SODIUM 40 MG/1
40 TABLET, DELAYED RELEASE ORAL DAILY
Qty: 90 TABLET | Refills: 1 | Status: SHIPPED | OUTPATIENT
Start: 2024-03-14

## 2024-03-14 RX ORDER — LISINOPRIL 10 MG/1
10 TABLET ORAL DAILY
Qty: 90 TABLET | Refills: 1 | Status: SHIPPED | OUTPATIENT
Start: 2024-03-14

## 2024-03-14 RX ORDER — FERROUS SULFATE 325(65) MG
325 TABLET ORAL
COMMUNITY

## 2024-03-14 ASSESSMENT — ENCOUNTER SYMPTOMS
ABDOMINAL PAIN: 0
COUGH: 0
SHORTNESS OF BREATH: 0

## 2024-03-14 NOTE — PROGRESS NOTES
SUBJECTIVE:   Jamison Robison Jr. is a 58 y.o. male seen for a visit regarding   Chief Complaint   Patient presents with    Follow-up        HPI  Was in the hospital; runs a [employer]     H/o GI bleed, Gastric polyp. Cirrhosis of Liver; CANNON - seeing Dr. Mcwilliams, on Lasix 40mg daily; trial off spiranolactone as patient is concerned about having gynecomastia  Anemia due to GI bleed - per patient had labs done with Gastro after 2/28 - he will send the results  H/o TIA - was on Plavix; has appt. with Cardiology and Neurology  Diabetes - controlled, Hba1c - 5.4; off Metformin now  T11 age-indeterminate compression fracture - currently without symptoms; has not done DEXA scan yet - given phone number to call    Past Medical History, Past Surgical History, Family history, Social History, and Medications were all reviewed with the patient today and updated as necessary.       Current Outpatient Medications   Medication Sig Dispense Refill    ferrous sulfate (IRON 325) 325 (65 Fe) MG tablet Take 1 tablet by mouth daily (with breakfast)      venlafaxine (EFFEXOR) 75 MG tablet Take 1 tablet by mouth daily 90 tablet 1    pantoprazole (PROTONIX) 40 MG tablet Take 1 tablet by mouth daily 90 tablet 1    lisinopril (PRINIVIL;ZESTRIL) 10 MG tablet Take 1 tablet by mouth daily 90 tablet 1    furosemide (LASIX) 40 MG tablet Take 1 tablet by mouth daily as needed (fluid) 90 tablet 1    atorvastatin (LIPITOR) 10 MG tablet Take 1 tablet by mouth every morning 90 tablet 1    aspirin 81 MG EC tablet Take 1 tablet by mouth daily      acetaminophen (TYLENOL) 500 MG tablet Take 1 tablet by mouth every 6 hours as needed for Pain or Fever No more than 2g tylenol a day ideally 30 tablet 1    lactulose (CHRONULAC) 10 GM/15ML solution Take 30 mLs by mouth 2 times daily      MELATONIN PO Take by mouth nightly as needed Dosage unknown      diphenhydrAMINE (BENADRYL) 25 MG capsule Take 2 capsules by mouth nightly as needed

## 2024-03-21 NOTE — PROGRESS NOTES
of Onset    Hypertension Father     Dementia Father     Diabetes Father     Cancer Mother      Social History     Tobacco Use    Smoking status: Never    Smokeless tobacco: Never   Substance Use Topics    Alcohol use: No       ROS:    Review of Systems   Cardiovascular:  Negative for chest pain and palpitations.   Respiratory:  Positive for shortness of breath.    Neurological:  Positive for numbness.          PHYSICAL EXAM:   /72   Pulse 69   Ht 1.803 m (5' 11\")   Wt 124.3 kg (274 lb)   BMI 38.22 kg/m²      Physical Exam  Constitutional:       Appearance: Normal appearance.   HENT:      Head: Normocephalic and atraumatic.   Eyes:      Conjunctiva/sclera: Conjunctivae normal.   Neck:      Vascular: No carotid bruit.   Cardiovascular:      Rate and Rhythm: Normal rate and regular rhythm.      Heart sounds: Murmur heard.      High-pitched blowing holosystolic murmur is present with a grade of 2/6 at the apex.      No friction rub. No gallop.   Pulmonary:      Effort: No respiratory distress.      Breath sounds: No wheezing or rales.   Abdominal:      General: Abdomen is flat. There is no distension.      Palpations: Abdomen is soft.      Tenderness: There is no abdominal tenderness.   Musculoskeletal:         General: No swelling.      Cervical back: Neck supple.   Skin:     General: Skin is warm and dry.   Neurological:      General: No focal deficit present.      Mental Status: He is alert.   Psychiatric:         Mood and Affect: Mood normal.         Behavior: Behavior normal.         Medical problems and test results were reviewed with the patient today.     DATA REVIEW    BMP  Lab Results   Component Value Date/Time     02/28/2024 11:26 AM    K 4.3 02/28/2024 11:26 AM     02/28/2024 11:26 AM    CO2 23 02/28/2024 11:26 AM    BUN 12 02/28/2024 11:26 AM    CREATININE 0.88 02/28/2024 11:26 AM    GLUCOSE 127 02/28/2024 11:26 AM    CALCIUM 8.8 02/28/2024 11:26 AM        LIPIDS  Lab Results

## 2024-03-22 ENCOUNTER — INITIAL CONSULT (OUTPATIENT)
Age: 59
End: 2024-03-22
Payer: COMMERCIAL

## 2024-03-22 VITALS
SYSTOLIC BLOOD PRESSURE: 132 MMHG | WEIGHT: 274 LBS | BODY MASS INDEX: 38.36 KG/M2 | HEIGHT: 71 IN | HEART RATE: 69 BPM | DIASTOLIC BLOOD PRESSURE: 72 MMHG

## 2024-03-22 DIAGNOSIS — I85.00 ESOPHAGEAL AND GASTRIC VARICES (HCC): Chronic | ICD-10-CM

## 2024-03-22 DIAGNOSIS — I10 ESSENTIAL HYPERTENSION: Chronic | ICD-10-CM

## 2024-03-22 DIAGNOSIS — K75.81 LIVER CIRRHOSIS SECONDARY TO NASH (NONALCOHOLIC STEATOHEPATITIS) (HCC): Chronic | ICD-10-CM

## 2024-03-22 DIAGNOSIS — E78.5 DYSLIPIDEMIA: ICD-10-CM

## 2024-03-22 DIAGNOSIS — R07.89 CHEST DISCOMFORT: Primary | ICD-10-CM

## 2024-03-22 DIAGNOSIS — R06.02 SHORTNESS OF BREATH: ICD-10-CM

## 2024-03-22 DIAGNOSIS — K74.60 LIVER CIRRHOSIS SECONDARY TO NASH (NONALCOHOLIC STEATOHEPATITIS) (HCC): Chronic | ICD-10-CM

## 2024-03-22 DIAGNOSIS — I86.4 ESOPHAGEAL AND GASTRIC VARICES (HCC): Chronic | ICD-10-CM

## 2024-03-22 PROCEDURE — 3078F DIAST BP <80 MM HG: CPT | Performed by: INTERNAL MEDICINE

## 2024-03-22 PROCEDURE — 99204 OFFICE O/P NEW MOD 45 MIN: CPT | Performed by: INTERNAL MEDICINE

## 2024-03-22 PROCEDURE — 3075F SYST BP GE 130 - 139MM HG: CPT | Performed by: INTERNAL MEDICINE

## 2024-03-22 PROCEDURE — 93000 ELECTROCARDIOGRAM COMPLETE: CPT | Performed by: INTERNAL MEDICINE

## 2024-03-22 ASSESSMENT — ENCOUNTER SYMPTOMS: SHORTNESS OF BREATH: 1

## 2024-03-22 NOTE — PATIENT INSTRUCTIONS
seek immediate medical care if:    Your shortness of breath gets worse or you start to wheeze. Wheezing is a high-pitched sound when you breathe.     You wake up at night out of breath or have to prop your head up on several pillows to breathe.     You are short of breath after only light activity or while at rest.   Watch closely for changes in your health, and be sure to contact your doctor if:    You do not get better over the next 1 to 2 days.   Where can you learn more?  Go to https://www.[employer].net/patientEd and enter S780 to learn more about \"Shortness of Breath: Care Instructions.\"  Current as of: August 6, 2023               Content Version: 14.0  © 2006-2024 [employer].   Care instructions adapted under license by [email or web address]. If you have questions about a medical condition or this instruction, always ask your healthcare professional. [employer] disclaims any warranty or liability for your use of this information.

## 2024-04-08 ENCOUNTER — TELEPHONE (OUTPATIENT)
Dept: INTERNAL MEDICINE CLINIC | Facility: CLINIC | Age: 59
End: 2024-04-08

## 2024-05-06 NOTE — PROGRESS NOTES
[facility] CARDIOLOGY  [address] [place] [address], [address]  [place] [address]  PHONE: [phone]      24    NAME:  Jamison Robison Jr.  : 1965  MRN: [id number]         SUBJECTIVE:   Jamison Robison Jr. is a 58 y.o. male seen for a follow up visit regarding the following:     Chief Complaint   Patient presents with    Chest discomfort    Hypertension            HPI:  Follow up  Chest discomfort and Hypertension   .    Follow up chest discomfort, went to [facility]. High risk with hypertension, DM, dyslipidemia, MORGAN, obesity, cirrhosis d/t CANNON with varices, prior GIB d/t polyp  Echo and stress test both look well and are detailed below.  He's felt ok.  Admits he's out of shape, but his prior left arm tingling resolved, denies dyspnea apart from being deconditioned            PAST CARDIAC HISTORY:  2023       EF 64%, mild LVH, normal DF, mild AI, MR, mild LAE, negative bubble study       TIA, imaging small lacunar infarct, no antiplatelet or AC d/t thrombocytopenia d/t CANNON related cirrhosis  2024       NST - small fixed apical defect, no ischemia, EF 70%, low risk       Echo - EF 55-60%, mild LVH, abn DF, mild AI/AS, mean gradient only 8, severe LAE              Cardiac Medications       ACE Inhibitors       lisinopril (PRINIVIL;ZESTRIL) 10 MG tablet Take 1 tablet by mouth daily       Beta Blockers Non-Selective       nadolol (CORGARD) 40 MG tablet Take 1 tablet by mouth nightly       Loop Diuretics       furosemide (LASIX) 40 MG tablet Take 1 tablet by mouth daily as needed (fluid)     Patient taking differently: Take 1 tablet by mouth daily       HMG CoA Reductase Inhibitors       atorvastatin (LIPITOR) 10 MG tablet Take 1 tablet by mouth every morning       Salicylates       aspirin 81 MG EC tablet Take 1 tablet by mouth daily          Diabetic Medications       Biguanides       metFORMIN (GLUCOPHAGE-XR) 500 MG extended release tablet Take 1 tablet by mouth Daily with

## 2024-05-07 ENCOUNTER — OFFICE VISIT (OUTPATIENT)
Age: 59
End: 2024-05-07
Payer: COMMERCIAL

## 2024-05-07 VITALS
DIASTOLIC BLOOD PRESSURE: 78 MMHG | WEIGHT: 277.7 LBS | HEIGHT: 71 IN | HEART RATE: 58 BPM | BODY MASS INDEX: 38.88 KG/M2 | SYSTOLIC BLOOD PRESSURE: 126 MMHG

## 2024-05-07 DIAGNOSIS — R06.02 SHORTNESS OF BREATH: ICD-10-CM

## 2024-05-07 DIAGNOSIS — K75.81 LIVER CIRRHOSIS SECONDARY TO NASH (NONALCOHOLIC STEATOHEPATITIS) (HCC): Chronic | ICD-10-CM

## 2024-05-07 DIAGNOSIS — I85.00 ESOPHAGEAL AND GASTRIC VARICES (HCC): Chronic | ICD-10-CM

## 2024-05-07 DIAGNOSIS — I10 ESSENTIAL HYPERTENSION: Primary | Chronic | ICD-10-CM

## 2024-05-07 DIAGNOSIS — R07.89 CHEST DISCOMFORT: ICD-10-CM

## 2024-05-07 DIAGNOSIS — I86.4 ESOPHAGEAL AND GASTRIC VARICES (HCC): Chronic | ICD-10-CM

## 2024-05-07 DIAGNOSIS — K74.60 LIVER CIRRHOSIS SECONDARY TO NASH (NONALCOHOLIC STEATOHEPATITIS) (HCC): Chronic | ICD-10-CM

## 2024-05-07 PROCEDURE — 3078F DIAST BP <80 MM HG: CPT | Performed by: INTERNAL MEDICINE

## 2024-05-07 PROCEDURE — 99214 OFFICE O/P EST MOD 30 MIN: CPT | Performed by: INTERNAL MEDICINE

## 2024-05-07 PROCEDURE — 3074F SYST BP LT 130 MM HG: CPT | Performed by: INTERNAL MEDICINE

## 2024-05-07 RX ORDER — METFORMIN HYDROCHLORIDE 500 MG/1
500 TABLET, EXTENDED RELEASE ORAL
COMMUNITY
Start: 2024-03-10

## 2024-05-07 ASSESSMENT — ENCOUNTER SYMPTOMS: SHORTNESS OF BREATH: 0

## 2024-05-10 ENCOUNTER — TELEPHONE (OUTPATIENT)
Dept: INTERNAL MEDICINE CLINIC | Facility: CLINIC | Age: 59
End: 2024-05-10

## 2024-05-10 NOTE — TELEPHONE ENCOUNTER
Dr. Alaniz, are you ok with us moving his appointment up or doing an acute visit to discuss his concerns over sleep?

## 2024-05-10 NOTE — TELEPHONE ENCOUNTER
Pt is calling in complaining of not sleeping. He states Dr. Briscoe put him on mirtazapine (REMERON) 15 MG tablet nightly but then pt had a stroke and thought the meds were the cause and stopped taking them. Dr. Briscoe advised him that the meds were not the cause of his stroke but pt was still freaking out and did not continue. He is now having trouble staying asleep at night and is asking for something that can possibly help. I'm routing this to you because this pt will be seeing Dr. Alaniz on 7/1

## 2024-05-13 ENCOUNTER — OFFICE VISIT (OUTPATIENT)
Dept: INTERNAL MEDICINE CLINIC | Facility: CLINIC | Age: 59
End: 2024-05-13
Payer: COMMERCIAL

## 2024-05-13 VITALS
SYSTOLIC BLOOD PRESSURE: 120 MMHG | DIASTOLIC BLOOD PRESSURE: 64 MMHG | WEIGHT: 277 LBS | OXYGEN SATURATION: 99 % | HEART RATE: 64 BPM | HEIGHT: 71 IN | BODY MASS INDEX: 38.78 KG/M2

## 2024-05-13 DIAGNOSIS — E78.00 HYPERCHOLESTEROLEMIA: Chronic | ICD-10-CM

## 2024-05-13 DIAGNOSIS — E11.9 CONTROLLED TYPE 2 DIABETES MELLITUS WITHOUT COMPLICATION, WITHOUT LONG-TERM CURRENT USE OF INSULIN (HCC): ICD-10-CM

## 2024-05-13 DIAGNOSIS — F51.01 PRIMARY INSOMNIA: Primary | ICD-10-CM

## 2024-05-13 PROCEDURE — 3074F SYST BP LT 130 MM HG: CPT | Performed by: STUDENT IN AN ORGANIZED HEALTH CARE EDUCATION/TRAINING PROGRAM

## 2024-05-13 PROCEDURE — 3078F DIAST BP <80 MM HG: CPT | Performed by: STUDENT IN AN ORGANIZED HEALTH CARE EDUCATION/TRAINING PROGRAM

## 2024-05-13 PROCEDURE — 99214 OFFICE O/P EST MOD 30 MIN: CPT | Performed by: STUDENT IN AN ORGANIZED HEALTH CARE EDUCATION/TRAINING PROGRAM

## 2024-05-13 RX ORDER — SUVOREXANT 5 MG/1
1 TABLET, FILM COATED ORAL NIGHTLY PRN
Qty: 30 TABLET | Refills: 0 | Status: SHIPPED | OUTPATIENT
Start: 2024-05-13 | End: 2024-06-12

## 2024-05-13 ASSESSMENT — PATIENT HEALTH QUESTIONNAIRE - PHQ9
9. THOUGHTS THAT YOU WOULD BE BETTER OFF DEAD, OR OF HURTING YOURSELF: NOT AT ALL
8. MOVING OR SPEAKING SO SLOWLY THAT OTHER PEOPLE COULD HAVE NOTICED. OR THE OPPOSITE, BEING SO FIGETY OR RESTLESS THAT YOU HAVE BEEN MOVING AROUND A LOT MORE THAN USUAL: NOT AT ALL
3. TROUBLE FALLING OR STAYING ASLEEP: NEARLY EVERY DAY
7. TROUBLE CONCENTRATING ON THINGS, SUCH AS READING THE NEWSPAPER OR WATCHING TELEVISION: SEVERAL DAYS
5. POOR APPETITE OR OVEREATING: NOT AT ALL
SUM OF ALL RESPONSES TO PHQ QUESTIONS 1-9: 7
SUM OF ALL RESPONSES TO PHQ9 QUESTIONS 1 & 2: 0
1. LITTLE INTEREST OR PLEASURE IN DOING THINGS: NOT AT ALL
6. FEELING BAD ABOUT YOURSELF - OR THAT YOU ARE A FAILURE OR HAVE LET YOURSELF OR YOUR FAMILY DOWN: NOT AT ALL
4. FEELING TIRED OR HAVING LITTLE ENERGY: NEARLY EVERY DAY
2. FEELING DOWN, DEPRESSED OR HOPELESS: NOT AT ALL
SUM OF ALL RESPONSES TO PHQ QUESTIONS 1-9: 7
10. IF YOU CHECKED OFF ANY PROBLEMS, HOW DIFFICULT HAVE THESE PROBLEMS MADE IT FOR YOU TO DO YOUR WORK, TAKE CARE OF THINGS AT HOME, OR GET ALONG WITH OTHER PEOPLE: NOT DIFFICULT AT ALL

## 2024-05-13 ASSESSMENT — ENCOUNTER SYMPTOMS
NAUSEA: 0
DIARRHEA: 0
BACK PAIN: 0
CONSTIPATION: 0
COLOR CHANGE: 0
COUGH: 0
SINUS PRESSURE: 0
ABDOMINAL PAIN: 0
SHORTNESS OF BREATH: 0
ABDOMINAL DISTENTION: 0

## 2024-05-13 NOTE — PROGRESS NOTES
Sleep Problem  Pertinent negatives include no abdominal pain, arthralgias, chills, congestion, coughing, diaphoresis, fatigue, fever, headaches, nausea, numbness or sore throat.     Pleasant 58-year-old gentleman here to establish care and discuss insomnia states that he can fall asleep but cannot stay asleep this been going on for several years it appears.  No acute symptoms no chest pain shortness of breath nausea vomiting fever chills.  Has tried Remeron in the past.  States this has not helped him    Past Medical History, Past Surgical History, Family history, Social History, and Medications were all reviewed with the patient today and updated as necessary.       Current Outpatient Medications   Medication Sig Dispense Refill    Doxylamine Succinate, Sleep, (SLEEP AID PO) Take by mouth      metFORMIN (GLUCOPHAGE-XR) 500 MG extended release tablet Take 1 tablet by mouth daily as needed      ferrous sulfate (IRON 325) 325 (65 Fe) MG tablet Take 1 tablet by mouth daily (with breakfast)      venlafaxine (EFFEXOR) 75 MG tablet Take 1 tablet by mouth daily 90 tablet 1    pantoprazole (PROTONIX) 40 MG tablet Take 1 tablet by mouth daily 90 tablet 1    lisinopril (PRINIVIL;ZESTRIL) 10 MG tablet Take 1 tablet by mouth daily 90 tablet 1    furosemide (LASIX) 40 MG tablet Take 1 tablet by mouth daily as needed (fluid) (Patient taking differently: Take 1 tablet by mouth daily) 90 tablet 1    atorvastatin (LIPITOR) 10 MG tablet Take 1 tablet by mouth every morning 90 tablet 1    aspirin 81 MG EC tablet Take 1 tablet by mouth daily      acetaminophen (TYLENOL) 500 MG tablet Take 1 tablet by mouth every 6 hours as needed for Pain or Fever No more than 2g tylenol a day ideally 30 tablet 1    lactulose (CHRONULAC) 10 GM/15ML solution Take 30 mLs by mouth 2 times daily      CPAP Machine MISC by Does not apply route at bedtime      blood glucose test strips (RELION PREMIER TEST) strip Testing daily as directed      Blood Glucose

## 2024-05-30 DIAGNOSIS — G47.00 INSOMNIA, UNSPECIFIED TYPE: Primary | ICD-10-CM

## 2024-05-30 RX ORDER — RAMELTEON 8 MG/1
8 TABLET ORAL NIGHTLY PRN
Qty: 14 TABLET | Refills: 3 | Status: SHIPPED | OUTPATIENT
Start: 2024-05-30 | End: 2025-05-30

## 2024-06-21 ENCOUNTER — OFFICE VISIT (OUTPATIENT)
Dept: INTERNAL MEDICINE CLINIC | Facility: CLINIC | Age: 59
End: 2024-06-21
Payer: COMMERCIAL

## 2024-06-21 VITALS
SYSTOLIC BLOOD PRESSURE: 118 MMHG | WEIGHT: 274.4 LBS | BODY MASS INDEX: 38.27 KG/M2 | OXYGEN SATURATION: 97 % | HEART RATE: 71 BPM | DIASTOLIC BLOOD PRESSURE: 74 MMHG

## 2024-06-21 DIAGNOSIS — I85.00 ESOPHAGEAL AND GASTRIC VARICES (HCC): ICD-10-CM

## 2024-06-21 DIAGNOSIS — H61.23 IMPACTED CERUMEN OF BOTH EARS: Primary | ICD-10-CM

## 2024-06-21 DIAGNOSIS — I10 ESSENTIAL (PRIMARY) HYPERTENSION: ICD-10-CM

## 2024-06-21 DIAGNOSIS — I86.4 ESOPHAGEAL AND GASTRIC VARICES (HCC): ICD-10-CM

## 2024-06-21 DIAGNOSIS — E11.9 CONTROLLED TYPE 2 DIABETES MELLITUS WITHOUT COMPLICATION, WITHOUT LONG-TERM CURRENT USE OF INSULIN (HCC): ICD-10-CM

## 2024-06-21 DIAGNOSIS — R60.9 EDEMA, UNSPECIFIED TYPE: ICD-10-CM

## 2024-06-21 LAB
EST. AVERAGE GLUCOSE BLD GHB EST-MCNC: 112 MG/DL
HBA1C MFR BLD: 5.5 % (ref 0–5.6)

## 2024-06-21 PROCEDURE — 3074F SYST BP LT 130 MM HG: CPT | Performed by: STUDENT IN AN ORGANIZED HEALTH CARE EDUCATION/TRAINING PROGRAM

## 2024-06-21 PROCEDURE — 3078F DIAST BP <80 MM HG: CPT | Performed by: STUDENT IN AN ORGANIZED HEALTH CARE EDUCATION/TRAINING PROGRAM

## 2024-06-21 PROCEDURE — 99214 OFFICE O/P EST MOD 30 MIN: CPT | Performed by: STUDENT IN AN ORGANIZED HEALTH CARE EDUCATION/TRAINING PROGRAM

## 2024-06-21 RX ORDER — PSEUDOEPHEDRINE HCL 30 MG
30 TABLET ORAL EVERY 6 HOURS PRN
Qty: 15 TABLET | Refills: 0 | Status: SHIPPED | OUTPATIENT
Start: 2024-06-21 | End: 2025-06-21

## 2024-06-21 RX ORDER — RAMELTEON 8 MG/1
8 TABLET ORAL NIGHTLY PRN
Qty: 30 TABLET | Refills: 3 | Status: SHIPPED | OUTPATIENT
Start: 2024-06-21 | End: 2025-06-21

## 2024-06-21 RX ORDER — PANTOPRAZOLE SODIUM 40 MG/1
40 TABLET, DELAYED RELEASE ORAL DAILY
Qty: 90 TABLET | Refills: 1 | Status: SHIPPED | OUTPATIENT
Start: 2024-06-21

## 2024-06-21 RX ORDER — LISINOPRIL 10 MG/1
10 TABLET ORAL DAILY
Qty: 90 TABLET | Refills: 1 | Status: SHIPPED | OUTPATIENT
Start: 2024-06-21

## 2024-06-21 RX ORDER — ATORVASTATIN CALCIUM 10 MG/1
10 TABLET, FILM COATED ORAL EVERY MORNING
Qty: 90 TABLET | Refills: 1 | Status: SHIPPED | OUTPATIENT
Start: 2024-06-21

## 2024-06-21 RX ORDER — VENLAFAXINE 75 MG/1
75 TABLET ORAL DAILY
Qty: 90 TABLET | Refills: 1 | Status: SHIPPED | OUTPATIENT
Start: 2024-06-21

## 2024-06-21 ASSESSMENT — ENCOUNTER SYMPTOMS
NAUSEA: 0
SHORTNESS OF BREATH: 0
COUGH: 0
SORE THROAT: 0
SINUS PRESSURE: 0
BACK PAIN: 0
ABDOMINAL PAIN: 0
CONSTIPATION: 0
ABDOMINAL DISTENTION: 0
COLOR CHANGE: 0
SINUS PAIN: 0
CHEST TIGHTNESS: 0
DIARRHEA: 0

## 2024-06-21 NOTE — PROGRESS NOTES
Medication Refill  Pertinent negatives include no abdominal pain, arthralgias, chills, congestion, coughing, diaphoresis, fatigue, fever, headaches, nausea, numbness or sore throat.        Past Medical History, Past Surgical History, Family history, Social History, and Medications were all reviewed with the patient today and updated as necessary.       Patient with some left ear pain for the past week or so has been [facility] and took amoxicillin somewhat improved but still has some pressure.  No fevers chills nausea vomiting chest pain shortness of breath or other symptoms today      Current Outpatient Medications   Medication Sig Dispense Refill    ramelteon (ROZEREM) 8 MG tablet Take 1 tablet by mouth nightly as needed for Sleep 14 tablet 3    metFORMIN (GLUCOPHAGE-XR) 500 MG extended release tablet Take 1 tablet by mouth daily as needed      ferrous sulfate (IRON 325) 325 (65 Fe) MG tablet Take 1 tablet by mouth daily (with breakfast)      venlafaxine (EFFEXOR) 75 MG tablet Take 1 tablet by mouth daily 90 tablet 1    pantoprazole (PROTONIX) 40 MG tablet Take 1 tablet by mouth daily 90 tablet 1    lisinopril (PRINIVIL;ZESTRIL) 10 MG tablet Take 1 tablet by mouth daily 90 tablet 1    furosemide (LASIX) 40 MG tablet Take 1 tablet by mouth daily as needed (fluid) (Patient taking differently: Take 1 tablet by mouth daily) 90 tablet 1    atorvastatin (LIPITOR) 10 MG tablet Take 1 tablet by mouth every morning 90 tablet 1    aspirin 81 MG EC tablet Take 1 tablet by mouth daily      acetaminophen (TYLENOL) 500 MG tablet Take 1 tablet by mouth every 6 hours as needed for Pain or Fever No more than 2g tylenol a day ideally 30 tablet 1    lactulose (CHRONULAC) 10 GM/15ML solution Take 30 mLs by mouth 2 times daily      MELATONIN PO Take by mouth nightly as needed Dosage unknown      CPAP Machine MISC by Does not apply route at bedtime      vitamin B-12 (CYANOCOBALAMIN) 1000 MCG tablet Take 1 tablet by mouth

## 2024-07-22 ENCOUNTER — OFFICE VISIT (OUTPATIENT)
Dept: ENT CLINIC | Age: 59
End: 2024-07-22
Payer: COMMERCIAL

## 2024-07-22 VITALS — BODY MASS INDEX: 39.03 KG/M2 | RESPIRATION RATE: 16 BRPM | HEIGHT: 71 IN | WEIGHT: 278.8 LBS

## 2024-07-22 DIAGNOSIS — H81.20 VESTIBULAR NEURONITIS, UNSPECIFIED LATERALITY: Primary | ICD-10-CM

## 2024-07-22 DIAGNOSIS — R42 DIZZINESS: ICD-10-CM

## 2024-07-22 PROCEDURE — 99204 OFFICE O/P NEW MOD 45 MIN: CPT | Performed by: OTOLARYNGOLOGY

## 2024-07-22 ASSESSMENT — ENCOUNTER SYMPTOMS
GASTROINTESTINAL NEGATIVE: 1
ALLERGIC/IMMUNOLOGIC NEGATIVE: 1
EYES NEGATIVE: 1
RESPIRATORY NEGATIVE: 1

## 2024-07-22 NOTE — PROGRESS NOTES
Chief Complaint   Patient presents with    New Patient    Cerumen Impaction     Wears hearing aid/ has had bouts of dizziness as well        HPI:  Jamison Robison Jr. is a 58 y.o. male seen today in follow-up for his ears.  I had seen him back in 2020 and diagnosed him with mild sloping to mod SNHL bilaterally at that time.  He has since been fit for hearing aids which she wears daily.  He had noted some moisture from his left ear a few weeks ago and was [facility] and underwent ear cleaning.  During a follow-up visit at his PCPs office, his ears continue to look clogged with cerumen impaction.  More importantly, he woke up about 1 week ago with generalized imbalance and disequilibrium.  He remains symptomatic for most of that day and even reported some nausea and diarrhea.  He never had any room spinning vertigo, and there was never any acute change in hearing, tinnitus, or aural fullness.  His symptoms have gradually improved over the last few days, and he feels essentially back to normal now.  This is a new complaint, he denies any previous history of inner ear disease.  There was no preceding URI or head injury, and he has not started any new medications lately.  He was worked up with an MRI scan last year which was clear.    Past Medical History, Past Surgical History, Family history, Social History, and Medications were all reviewed with the patient today and updated as necessary.     No Known Allergies  Patient Active Problem List   Diagnosis    Obstructive sleep apnea on CPAP    Osteoarthritis of left knee    Tear of lateral meniscus of left knee    Right knee pain    Controlled type 2 diabetes mellitus without complication, without long-term current use of insulin (HCC)    Thrombocytopenia (HCC)    Allergic rhinitis    Liver cirrhosis secondary to CANNON (nonalcoholic steatohepatitis) (HCC)    Esophageal and gastric varices (HCC)    Gastroesophageal reflux disease without esophagitis

## 2024-09-03 ENCOUNTER — OFFICE VISIT (OUTPATIENT)
Dept: NEUROLOGY | Age: 59
End: 2024-09-03
Payer: COMMERCIAL

## 2024-09-03 VITALS
HEART RATE: 67 BPM | HEIGHT: 68 IN | BODY MASS INDEX: 41.46 KG/M2 | WEIGHT: 273.6 LBS | OXYGEN SATURATION: 98 % | DIASTOLIC BLOOD PRESSURE: 85 MMHG | SYSTOLIC BLOOD PRESSURE: 136 MMHG

## 2024-09-03 DIAGNOSIS — G47.33 OSA ON CPAP: ICD-10-CM

## 2024-09-03 DIAGNOSIS — R41.3 MEMORY CHANGES: ICD-10-CM

## 2024-09-03 DIAGNOSIS — K75.81 LIVER CIRRHOSIS SECONDARY TO NASH (NONALCOHOLIC STEATOHEPATITIS) (HCC): Chronic | ICD-10-CM

## 2024-09-03 DIAGNOSIS — Z86.73 HISTORY OF TIA (TRANSIENT ISCHEMIC ATTACK): Primary | ICD-10-CM

## 2024-09-03 DIAGNOSIS — K74.60 LIVER CIRRHOSIS SECONDARY TO NASH (NONALCOHOLIC STEATOHEPATITIS) (HCC): Chronic | ICD-10-CM

## 2024-09-03 PROCEDURE — 99205 OFFICE O/P NEW HI 60 MIN: CPT | Performed by: PSYCHIATRY & NEUROLOGY

## 2024-09-03 PROCEDURE — 3075F SYST BP GE 130 - 139MM HG: CPT | Performed by: PSYCHIATRY & NEUROLOGY

## 2024-09-03 PROCEDURE — 3079F DIAST BP 80-89 MM HG: CPT | Performed by: PSYCHIATRY & NEUROLOGY

## 2024-09-03 RX ORDER — TRAZODONE HYDROCHLORIDE 50 MG/1
50 TABLET, FILM COATED ORAL NIGHTLY
COMMUNITY

## 2024-09-03 NOTE — PROGRESS NOTES
as directed      Blood Glucose Monitoring Suppl (RELION PRIME MONITOR) LES by Does not apply route      fluticasone (FLONASE) 50 MCG/ACT nasal spray 2 sprays by Nasal route as needed As needed      nadolol (CORGARD) 40 MG tablet Take 1 tablet by mouth nightly      ramelteon (ROZEREM) 8 MG tablet Take 1 tablet by mouth nightly as needed for Sleep 30 tablet 3    pseudoephedrine (DECONGESTANT) 30 MG tablet Take 1 tablet by mouth every 6 hours as needed for Congestion 15 tablet 0    Doxylamine Succinate, Sleep, (SLEEP AID PO) Take by mouth      furosemide (LASIX) 40 MG tablet Take 1 tablet by mouth daily as needed (fluid) (Patient taking differently: Take 1 tablet by mouth daily) 90 tablet 1    MELATONIN PO Take by mouth nightly as needed Dosage unknown      diphenhydrAMINE (BENADRYL) 25 MG capsule Take 2 capsules by mouth nightly as needed for Itching Was taking to help sleep but stopped when remeron started      vitamin B-12 (CYANOCOBALAMIN) 1000 MCG tablet Take 1 tablet by mouth daily       No facility-administered encounter medications on file as of 9/3/2024.       ALLERGIES:  No Known Allergies    Physical Exam:   /85   Pulse 67   Ht 1.727 m (5' 8\")   Wt 124.1 kg (273 lb 9.6 oz)   SpO2 98%   BMI 41.60 kg/m²     Physical Exam   General: cooperative, NAD   HEENT: MMM, anicteric sclerae  Cardiovascular: RRR   Respiratory: good air entry b/l, unlabored breathing  Gastrointestinal: non-distended  Extremities: no peripheral edema   Skin: warm, dry, no rash     Neurological Exam   Mental Status: AOx3, following commands, receptive and expressive language intact, no dysarthria   Cranial Nerves: visual fields full, EOM intact, sensation in V1-3 distributions intact and symmetric, facial muscles of expression symmetric, hearing grossly intact bilaterally, uvula midline, symmetric palate elevation, SCM and trap strength symmetric, tongue midline  Motor: normal bulk and tone, strength intact and appropriate in all

## 2024-09-16 DIAGNOSIS — E11.9 CONTROLLED TYPE 2 DIABETES MELLITUS WITHOUT COMPLICATION, WITHOUT LONG-TERM CURRENT USE OF INSULIN (HCC): Primary | Chronic | ICD-10-CM

## 2024-09-16 NOTE — TELEPHONE ENCOUNTER
Pt is needing a refill on his   -metFORMIN (GLUCOPHAGE-XR) 500 MG extended release tablet     Send to:  [id number] - [place] - [address] - P [phone] - F [phone] [[id number]]     LOV:6.21.2024  NOV:None Scheduled    ----Not exactly sure who to send this too----

## 2024-09-16 NOTE — TELEPHONE ENCOUNTER
Please adv   Pt is needing a refill on his   -metFORMIN (GLUCOPHAGE-XR) 500 MG extended release tablet     Send to:  [id number] - [place] - [address] - P [phone] - F [phone] [[id number]]     LOV:6.21.2024  NOV:None Scheduled

## 2024-09-17 ENCOUNTER — OFFICE VISIT (OUTPATIENT)
Dept: INTERNAL MEDICINE CLINIC | Facility: CLINIC | Age: 59
End: 2024-09-17
Payer: COMMERCIAL

## 2024-09-17 VITALS
SYSTOLIC BLOOD PRESSURE: 116 MMHG | TEMPERATURE: 98.5 F | BODY MASS INDEX: 41.97 KG/M2 | DIASTOLIC BLOOD PRESSURE: 70 MMHG | OXYGEN SATURATION: 98 % | HEART RATE: 65 BPM | WEIGHT: 276 LBS

## 2024-09-17 DIAGNOSIS — E11.9 CONTROLLED TYPE 2 DIABETES MELLITUS WITHOUT COMPLICATION, WITHOUT LONG-TERM CURRENT USE OF INSULIN (HCC): ICD-10-CM

## 2024-09-17 DIAGNOSIS — E78.00 HYPERCHOLESTEROLEMIA: ICD-10-CM

## 2024-09-17 DIAGNOSIS — J32.9 CHRONIC SINUSITIS, UNSPECIFIED LOCATION: Primary | ICD-10-CM

## 2024-09-17 PROCEDURE — 3044F HG A1C LEVEL LT 7.0%: CPT | Performed by: NURSE PRACTITIONER

## 2024-09-17 PROCEDURE — 3074F SYST BP LT 130 MM HG: CPT | Performed by: NURSE PRACTITIONER

## 2024-09-17 PROCEDURE — 99214 OFFICE O/P EST MOD 30 MIN: CPT | Performed by: NURSE PRACTITIONER

## 2024-09-17 PROCEDURE — 3078F DIAST BP <80 MM HG: CPT | Performed by: NURSE PRACTITIONER

## 2024-09-17 RX ORDER — ATORVASTATIN CALCIUM 10 MG/1
10 TABLET, FILM COATED ORAL EVERY MORNING
Qty: 90 TABLET | Refills: 1 | Status: SHIPPED | OUTPATIENT
Start: 2024-09-17

## 2024-09-17 RX ORDER — METFORMIN HCL 500 MG
500 TABLET, EXTENDED RELEASE 24 HR ORAL
Qty: 90 TABLET | Refills: 1 | Status: SHIPPED | OUTPATIENT
Start: 2024-09-17

## 2024-09-17 ASSESSMENT — PATIENT HEALTH QUESTIONNAIRE - PHQ9
9. THOUGHTS THAT YOU WOULD BE BETTER OFF DEAD, OR OF HURTING YOURSELF: NOT AT ALL
1. LITTLE INTEREST OR PLEASURE IN DOING THINGS: NOT AT ALL
7. TROUBLE CONCENTRATING ON THINGS, SUCH AS READING THE NEWSPAPER OR WATCHING TELEVISION: NOT AT ALL
SUM OF ALL RESPONSES TO PHQ9 QUESTIONS 1 & 2: 0
SUM OF ALL RESPONSES TO PHQ QUESTIONS 1-9: 4
5. POOR APPETITE OR OVEREATING: NOT AT ALL
8. MOVING OR SPEAKING SO SLOWLY THAT OTHER PEOPLE COULD HAVE NOTICED. OR THE OPPOSITE, BEING SO FIGETY OR RESTLESS THAT YOU HAVE BEEN MOVING AROUND A LOT MORE THAN USUAL: NOT AT ALL
SUM OF ALL RESPONSES TO PHQ QUESTIONS 1-9: 4
3. TROUBLE FALLING OR STAYING ASLEEP: MORE THAN HALF THE DAYS
2. FEELING DOWN, DEPRESSED OR HOPELESS: NOT AT ALL
10. IF YOU CHECKED OFF ANY PROBLEMS, HOW DIFFICULT HAVE THESE PROBLEMS MADE IT FOR YOU TO DO YOUR WORK, TAKE CARE OF THINGS AT HOME, OR GET ALONG WITH OTHER PEOPLE: NOT DIFFICULT AT ALL
6. FEELING BAD ABOUT YOURSELF - OR THAT YOU ARE A FAILURE OR HAVE LET YOURSELF OR YOUR FAMILY DOWN: NOT AT ALL
4. FEELING TIRED OR HAVING LITTLE ENERGY: MORE THAN HALF THE DAYS
SUM OF ALL RESPONSES TO PHQ QUESTIONS 1-9: 4
SUM OF ALL RESPONSES TO PHQ QUESTIONS 1-9: 4

## 2024-09-18 RX ORDER — METFORMIN HYDROCHLORIDE 500 MG/1
500 TABLET, EXTENDED RELEASE ORAL DAILY PRN
Qty: 30 TABLET | OUTPATIENT
Start: 2024-09-18

## 2024-09-23 ASSESSMENT — ENCOUNTER SYMPTOMS
CHEST TIGHTNESS: 0
COUGH: 0
SORE THROAT: 0
SHORTNESS OF BREATH: 0
SINUS PRESSURE: 1
SINUS PAIN: 1
RHINORRHEA: 1

## 2024-10-30 ENCOUNTER — PREP FOR PROCEDURE (OUTPATIENT)
Dept: ENT CLINIC | Age: 59
End: 2024-10-30

## 2024-10-30 ENCOUNTER — OFFICE VISIT (OUTPATIENT)
Dept: ENT CLINIC | Age: 59
End: 2024-10-30
Payer: COMMERCIAL

## 2024-10-30 VITALS
HEIGHT: 68 IN | SYSTOLIC BLOOD PRESSURE: 130 MMHG | WEIGHT: 272 LBS | DIASTOLIC BLOOD PRESSURE: 80 MMHG | BODY MASS INDEX: 41.22 KG/M2

## 2024-10-30 DIAGNOSIS — S02.2XXA CLOSED FRACTURE OF NASAL BONE, INITIAL ENCOUNTER: Primary | ICD-10-CM

## 2024-10-30 DIAGNOSIS — R04.0 EPISTAXIS: ICD-10-CM

## 2024-10-30 PROCEDURE — 3075F SYST BP GE 130 - 139MM HG: CPT | Performed by: OTOLARYNGOLOGY

## 2024-10-30 PROCEDURE — 99213 OFFICE O/P EST LOW 20 MIN: CPT | Performed by: OTOLARYNGOLOGY

## 2024-10-30 PROCEDURE — 31231 NASAL ENDOSCOPY DX: CPT | Performed by: OTOLARYNGOLOGY

## 2024-10-30 PROCEDURE — 3079F DIAST BP 80-89 MM HG: CPT | Performed by: OTOLARYNGOLOGY

## 2024-10-30 ASSESSMENT — ENCOUNTER SYMPTOMS
FACIAL SWELLING: 1
EYES NEGATIVE: 1
ALLERGIC/IMMUNOLOGIC NEGATIVE: 1
RESPIRATORY NEGATIVE: 1
GASTROINTESTINAL NEGATIVE: 1

## 2024-10-31 ENCOUNTER — TELEPHONE (OUTPATIENT)
Dept: SURGERY | Age: 59
End: 2024-10-31

## 2024-10-31 DIAGNOSIS — S02.2XXA CLOSED FRACTURE OF NASAL BONE, INITIAL ENCOUNTER: Primary | ICD-10-CM

## 2024-10-31 DIAGNOSIS — G89.18 POST-OPERATIVE PAIN: ICD-10-CM

## 2024-10-31 RX ORDER — ACETAMINOPHEN 325 MG/1
650 TABLET ORAL EVERY 6 HOURS PRN
COMMUNITY

## 2024-10-31 RX ORDER — CEPHALEXIN 500 MG/1
500 CAPSULE ORAL 4 TIMES DAILY
Qty: 28 CAPSULE | Refills: 0 | Status: SHIPPED | OUTPATIENT
Start: 2024-10-31 | End: 2024-11-07

## 2024-10-31 RX ORDER — VENLAFAXINE 75 MG/1
75 TABLET ORAL DAILY PRN
COMMUNITY

## 2024-10-31 RX ORDER — ONDANSETRON 4 MG/1
8 TABLET, FILM COATED ORAL EVERY 8 HOURS PRN
Qty: 8 TABLET | Refills: 0 | Status: SHIPPED | OUTPATIENT
Start: 2024-10-31

## 2024-10-31 RX ORDER — HYDROCODONE BITARTRATE AND ACETAMINOPHEN 5; 325 MG/1; MG/1
1 TABLET ORAL EVERY 4 HOURS PRN
Qty: 30 TABLET | Refills: 0 | Status: SHIPPED | OUTPATIENT
Start: 2024-10-31 | End: 2024-11-05

## 2024-10-31 NOTE — PERIOP NOTE
Patient verified name and .  Order for consent NOT found in EHR at time of PAT visit. Unable to verify case posting against order; surgery verified by patient.    Type 1B surgery, phone assessment complete.  Orders not received.  Labs per surgeon: none ordered  Labs per anesthesia protocol: SQBS, K+ s/h for DOS    Dr. Muro notified of case- hx of cirrhosis, 10/9/24 Hgb 13.3, platelets 61, Alk phos 57, AST 40, ALT 47, last PT 16.2, INR 1.3 on 24.  Per Dr. Muro notify Dr. Zurita of platelet count.  No other orders received.  Telephone encounter to Dr. Zurita    Patient answered medical/surgical history questions at their best of ability. All prior to admission medications documented in EPIC.    Patient instructed to continue taking all prescription medications up to the day of surgery but to take only the following medications the day of surgery according to anesthesia guidelines with a small sip of water: atorvastatin, pantoprazole.   No tylenol instructions due to cirrhosis        Patient informed that all vitamins and supplements should be held 7 days prior to surgery and NSAIDS (preventative aspirin 81 mg) 5 days prior to surgery. Prescription meds to hold:  none    Patient instructed on the following:    > Arrive at [facility] Entrance, time of arrival to be called the day before by 1700  > NPO after midnight, unless otherwise indicated, including gum, mints, and ice chips  > Please drink 32 ounces of water 2 hours prior to your arrival to avoid dehydration.    > Responsible adult must drive patient to the hospital, stay during surgery, and patient will need supervision 24 hours after anesthesia  > Use non moisturizing soap in shower the night before surgery and on the morning of surgery  > All piercings must be removed prior to arrival.    > Leave all valuables (money and jewelry) at home but bring insurance card and ID on DOS.   > You may be required to pay a deductible or co-pay on the day of your

## 2024-10-31 NOTE — TELEPHONE ENCOUNTER
Patient is scheduled for surgery on 11/4/24.  CBC 10/9/24 at [facility] platelets 61.       Care Everywhere Labs History    CBC   [facility]  Component 10/09/2024 02/01/2024 01/31/2024 01/31/2024 01/31/2024 01/30/2024            WBC 3 Low     3.1 Low     -- -- 2.9 Low     4.0   RBC 3.95 Low     2.97 Low     -- -- 2.94 Low     3.49 Low       Hemoglobin 13.3 9.7 Low     10.0 Low     10.3 Low     9.3 Low     11.4 Low       Hematocrit 38.4 29.1 Low     29.4 Low     30.9 Low     28.6 Low     33.5 Low       MCV 97 High     98.0 -- -- 97.3 96.0   MCH 33.7 High     32.7 -- -- 31.6 32.7   MCHC 34.7 33.3 -- -- 32.5 34.0   RDW-CV -- 15.2 -- -- 15.5 15.3   Platelets 61 Low Panic     82 Low     -- -- 78 Low     100 Low       MPV 8.7 10.3 -- -- 10.6 10.6   nRBC Percent -- 0 -- -- 0 0   nRBC Abs -- 0.0 -- -- 0.0 0.0   RDW 15.1 -- -- -- -- --       Thank you

## 2024-11-01 NOTE — PERIOP NOTE
Preop department called to notify patient of arrival time for scheduled procedure. Instructions given to   - Arrive at [facility]PC Entrance [address].  - No solid food after midnight & Please drink 32 ounces of water 2 hours prior to your arrival to avoid dehydration unless otherwise indicated. No gum, mints, or ice chips.   - Have a responsible adult to drive patient to the hospital, stay during surgery, and patient will need supervision 24 hours after anesthesia.   - Use antibacterial soap in shower the night before surgery and on the morning of surgery.       Was patient contacted: yes  Voicemail left:   Numbers contacted: [phone]   Arrival time: 1100  Time to complete 32 ounces of water: 0900

## 2024-11-01 NOTE — PERIOP NOTE
Preop department called to notify patient of arrival time for scheduled procedure. Instructions given to   - Arrive at OPC Entrance [address].  - No solid food after midnight & Please drink 32 ounces of water 2 hours prior to your arrival to avoid dehydration unless otherwise indicated. No gum, mints, or ice chips.   - Have a responsible adult to drive patient to the hospital, stay during surgery, and patient will need supervision 24 hours after anesthesia.   - Use antibacterial soap in shower the night before surgery and on the morning of surgery.       Was patient contacted: N  Voicemail left: Y  Numbers contacted: [phone]   Arrival time: 1100  Time to complete 32 ounces of water: 0900

## 2024-11-03 ENCOUNTER — ANESTHESIA EVENT (OUTPATIENT)
Dept: SURGERY | Age: 59
End: 2024-11-03
Payer: COMMERCIAL

## 2024-11-04 ENCOUNTER — ANESTHESIA (OUTPATIENT)
Dept: SURGERY | Age: 59
End: 2024-11-04
Payer: COMMERCIAL

## 2024-11-04 ENCOUNTER — HOSPITAL ENCOUNTER (OUTPATIENT)
Age: 59
Setting detail: OUTPATIENT SURGERY
Discharge: HOME | End: 2024-11-04
Attending: OTOLARYNGOLOGY | Admitting: OTOLARYNGOLOGY
Payer: COMMERCIAL

## 2024-11-04 VITALS
WEIGHT: 270 LBS | RESPIRATION RATE: 15 BRPM | SYSTOLIC BLOOD PRESSURE: 135 MMHG | TEMPERATURE: 98 F | OXYGEN SATURATION: 97 % | BODY MASS INDEX: 37.8 KG/M2 | HEART RATE: 58 BPM | DIASTOLIC BLOOD PRESSURE: 66 MMHG | HEIGHT: 71 IN

## 2024-11-04 DIAGNOSIS — S02.2XXA CLOSED FRACTURE OF NASAL BONE, INITIAL ENCOUNTER: ICD-10-CM

## 2024-11-04 LAB
GLUCOSE BLD STRIP.AUTO-MCNC: 98 MG/DL (ref 65–100)
POTASSIUM BLD-SCNC: 4.1 MMOL/L (ref 3.5–5.1)
SERVICE CMNT-IMP: NORMAL

## 2024-11-04 PROCEDURE — 2709999900 HC NON-CHARGEABLE SUPPLY: Performed by: OTOLARYNGOLOGY

## 2024-11-04 PROCEDURE — 7100000001 HC PACU RECOVERY - ADDTL 15 MIN: Performed by: OTOLARYNGOLOGY

## 2024-11-04 PROCEDURE — 6370000000 HC RX 637 (ALT 250 FOR IP): Performed by: OTOLARYNGOLOGY

## 2024-11-04 PROCEDURE — 3700000001 HC ADD 15 MINUTES (ANESTHESIA): Performed by: OTOLARYNGOLOGY

## 2024-11-04 PROCEDURE — 3600000012 HC SURGERY LEVEL 2 ADDTL 15MIN: Performed by: OTOLARYNGOLOGY

## 2024-11-04 PROCEDURE — 6370000000 HC RX 637 (ALT 250 FOR IP): Performed by: SURGERY

## 2024-11-04 PROCEDURE — 6360000002 HC RX W HCPCS: Performed by: NURSE ANESTHETIST, CERTIFIED REGISTERED

## 2024-11-04 PROCEDURE — 21320 CLSD TX NSL FX W/MNPJ&STABLJ: CPT | Performed by: OTOLARYNGOLOGY

## 2024-11-04 PROCEDURE — 3600000002 HC SURGERY LEVEL 2 BASE: Performed by: OTOLARYNGOLOGY

## 2024-11-04 PROCEDURE — 2500000003 HC RX 250 WO HCPCS: Performed by: NURSE ANESTHETIST, CERTIFIED REGISTERED

## 2024-11-04 PROCEDURE — 84132 ASSAY OF SERUM POTASSIUM: CPT

## 2024-11-04 PROCEDURE — 3700000000 HC ANESTHESIA ATTENDED CARE: Performed by: OTOLARYNGOLOGY

## 2024-11-04 PROCEDURE — 82962 GLUCOSE BLOOD TEST: CPT

## 2024-11-04 PROCEDURE — 7100000000 HC PACU RECOVERY - FIRST 15 MIN: Performed by: OTOLARYNGOLOGY

## 2024-11-04 PROCEDURE — 7100000010 HC PHASE II RECOVERY - FIRST 15 MIN: Performed by: OTOLARYNGOLOGY

## 2024-11-04 PROCEDURE — 2500000003 HC RX 250 WO HCPCS: Performed by: OTOLARYNGOLOGY

## 2024-11-04 RX ORDER — LABETALOL HYDROCHLORIDE 5 MG/ML
10 INJECTION, SOLUTION INTRAVENOUS
Status: DISCONTINUED | OUTPATIENT
Start: 2024-11-04 | End: 2024-11-04 | Stop reason: HOSPADM

## 2024-11-04 RX ORDER — HALOPERIDOL 5 MG/ML
1 INJECTION INTRAMUSCULAR
Status: DISCONTINUED | OUTPATIENT
Start: 2024-11-04 | End: 2024-11-04 | Stop reason: HOSPADM

## 2024-11-04 RX ORDER — SUCCINYLCHOLINE/SOD CL,ISO/PF 200MG/10ML
SYRINGE (ML) INTRAVENOUS
Status: DISCONTINUED | OUTPATIENT
Start: 2024-11-04 | End: 2024-11-04 | Stop reason: SDUPTHER

## 2024-11-04 RX ORDER — OXYMETAZOLINE HYDROCHLORIDE 0.05 G/100ML
SPRAY NASAL PRN
Status: DISCONTINUED | OUTPATIENT
Start: 2024-11-04 | End: 2024-11-04 | Stop reason: ALTCHOICE

## 2024-11-04 RX ORDER — ONDANSETRON 2 MG/ML
INJECTION INTRAMUSCULAR; INTRAVENOUS
Status: DISCONTINUED | OUTPATIENT
Start: 2024-11-04 | End: 2024-11-04 | Stop reason: SDUPTHER

## 2024-11-04 RX ORDER — FENTANYL CITRATE 50 UG/ML
INJECTION, SOLUTION INTRAMUSCULAR; INTRAVENOUS
Status: DISCONTINUED | OUTPATIENT
Start: 2024-11-04 | End: 2024-11-04 | Stop reason: SDUPTHER

## 2024-11-04 RX ORDER — LIDOCAINE HYDROCHLORIDE 20 MG/ML
INJECTION, SOLUTION EPIDURAL; INFILTRATION; INTRACAUDAL; PERINEURAL
Status: DISCONTINUED | OUTPATIENT
Start: 2024-11-04 | End: 2024-11-04 | Stop reason: SDUPTHER

## 2024-11-04 RX ORDER — GLYCOPYRROLATE 0.2 MG/ML
INJECTION INTRAMUSCULAR; INTRAVENOUS
Status: DISCONTINUED | OUTPATIENT
Start: 2024-11-04 | End: 2024-11-04 | Stop reason: SDUPTHER

## 2024-11-04 RX ORDER — PROPOFOL 10 MG/ML
INJECTION, EMULSION INTRAVENOUS
Status: DISCONTINUED | OUTPATIENT
Start: 2024-11-04 | End: 2024-11-04 | Stop reason: SDUPTHER

## 2024-11-04 RX ORDER — OXYCODONE HYDROCHLORIDE 5 MG/1
5 TABLET ORAL
Status: DISCONTINUED | OUTPATIENT
Start: 2024-11-04 | End: 2024-11-04 | Stop reason: HOSPADM

## 2024-11-04 RX ORDER — LIDOCAINE HYDROCHLORIDE AND EPINEPHRINE 10; 10 MG/ML; UG/ML
INJECTION, SOLUTION INFILTRATION; PERINEURAL PRN
Status: DISCONTINUED | OUTPATIENT
Start: 2024-11-04 | End: 2024-11-04 | Stop reason: ALTCHOICE

## 2024-11-04 RX ORDER — ACETAMINOPHEN 500 MG
500 TABLET ORAL ONCE
Status: COMPLETED | OUTPATIENT
Start: 2024-11-04 | End: 2024-11-04

## 2024-11-04 RX ORDER — SODIUM CHLORIDE 0.9 % (FLUSH) 0.9 %
5-40 SYRINGE (ML) INJECTION EVERY 12 HOURS SCHEDULED
Status: DISCONTINUED | OUTPATIENT
Start: 2024-11-04 | End: 2024-11-04 | Stop reason: HOSPADM

## 2024-11-04 RX ORDER — DEXAMETHASONE SODIUM PHOSPHATE 10 MG/ML
INJECTION INTRAMUSCULAR; INTRAVENOUS
Status: DISCONTINUED | OUTPATIENT
Start: 2024-11-04 | End: 2024-11-04 | Stop reason: SDUPTHER

## 2024-11-04 RX ORDER — PROCHLORPERAZINE EDISYLATE 5 MG/ML
5 INJECTION INTRAMUSCULAR; INTRAVENOUS
Status: DISCONTINUED | OUTPATIENT
Start: 2024-11-04 | End: 2024-11-04 | Stop reason: HOSPADM

## 2024-11-04 RX ORDER — SODIUM CHLORIDE 0.9 % (FLUSH) 0.9 %
5-40 SYRINGE (ML) INJECTION PRN
Status: DISCONTINUED | OUTPATIENT
Start: 2024-11-04 | End: 2024-11-04 | Stop reason: HOSPADM

## 2024-11-04 RX ORDER — IPRATROPIUM BROMIDE AND ALBUTEROL SULFATE 2.5; .5 MG/3ML; MG/3ML
1 SOLUTION RESPIRATORY (INHALATION)
Status: DISCONTINUED | OUTPATIENT
Start: 2024-11-04 | End: 2024-11-04 | Stop reason: HOSPADM

## 2024-11-04 RX ORDER — SODIUM CHLORIDE 9 MG/ML
INJECTION, SOLUTION INTRAVENOUS PRN
Status: DISCONTINUED | OUTPATIENT
Start: 2024-11-04 | End: 2024-11-04 | Stop reason: HOSPADM

## 2024-11-04 RX ORDER — DEXMEDETOMIDINE HYDROCHLORIDE 100 UG/ML
INJECTION, SOLUTION INTRAVENOUS
Status: DISCONTINUED | OUTPATIENT
Start: 2024-11-04 | End: 2024-11-04 | Stop reason: SDUPTHER

## 2024-11-04 RX ORDER — NALOXONE HYDROCHLORIDE 0.4 MG/ML
INJECTION, SOLUTION INTRAMUSCULAR; INTRAVENOUS; SUBCUTANEOUS PRN
Status: DISCONTINUED | OUTPATIENT
Start: 2024-11-04 | End: 2024-11-04 | Stop reason: HOSPADM

## 2024-11-04 RX ORDER — LIDOCAINE HYDROCHLORIDE 10 MG/ML
1 INJECTION, SOLUTION INFILTRATION; PERINEURAL
Status: DISCONTINUED | OUTPATIENT
Start: 2024-11-04 | End: 2024-11-04 | Stop reason: HOSPADM

## 2024-11-04 RX ORDER — SODIUM CHLORIDE, SODIUM LACTATE, POTASSIUM CHLORIDE, CALCIUM CHLORIDE 600; 310; 30; 20 MG/100ML; MG/100ML; MG/100ML; MG/100ML
INJECTION, SOLUTION INTRAVENOUS CONTINUOUS
Status: DISCONTINUED | OUTPATIENT
Start: 2024-11-04 | End: 2024-11-04 | Stop reason: HOSPADM

## 2024-11-04 RX ORDER — MIDAZOLAM HYDROCHLORIDE 1 MG/ML
INJECTION, SOLUTION INTRAMUSCULAR; INTRAVENOUS
Status: DISCONTINUED | OUTPATIENT
Start: 2024-11-04 | End: 2024-11-04 | Stop reason: SDUPTHER

## 2024-11-04 RX ORDER — HYDROMORPHONE HYDROCHLORIDE 2 MG/ML
0.5 INJECTION, SOLUTION INTRAMUSCULAR; INTRAVENOUS; SUBCUTANEOUS EVERY 5 MIN PRN
Status: DISCONTINUED | OUTPATIENT
Start: 2024-11-04 | End: 2024-11-04 | Stop reason: HOSPADM

## 2024-11-04 RX ADMIN — Medication 200 MG: at 13:01

## 2024-11-04 RX ADMIN — ONDANSETRON 4 MG: 2 INJECTION INTRAMUSCULAR; INTRAVENOUS at 13:04

## 2024-11-04 RX ADMIN — GLYCOPYRROLATE 0.2 MG: 0.2 INJECTION INTRAMUSCULAR; INTRAVENOUS at 13:17

## 2024-11-04 RX ADMIN — ACETAMINOPHEN 500 MG: 500 TABLET, FILM COATED ORAL at 12:18

## 2024-11-04 RX ADMIN — MIDAZOLAM 2 MG: 1 INJECTION INTRAMUSCULAR; INTRAVENOUS at 12:51

## 2024-11-04 RX ADMIN — PROPOFOL 200 MG: 10 INJECTION, EMULSION INTRAVENOUS at 13:00

## 2024-11-04 RX ADMIN — DEXAMETHASONE SODIUM PHOSPHATE 10 MG: 10 INJECTION INTRAMUSCULAR; INTRAVENOUS at 13:04

## 2024-11-04 RX ADMIN — FENTANYL CITRATE 50 MCG: 50 INJECTION, SOLUTION INTRAMUSCULAR; INTRAVENOUS at 13:04

## 2024-11-04 RX ADMIN — FENTANYL CITRATE 50 MCG: 50 INJECTION, SOLUTION INTRAMUSCULAR; INTRAVENOUS at 13:00

## 2024-11-04 RX ADMIN — DEXMEDETOMIDINE 8 MCG: 100 INJECTION, SOLUTION, CONCENTRATE INTRAVENOUS at 13:14

## 2024-11-04 RX ADMIN — LIDOCAINE HYDROCHLORIDE 100 MG: 20 INJECTION, SOLUTION EPIDURAL; INFILTRATION; INTRACAUDAL; PERINEURAL at 13:01

## 2024-11-04 ASSESSMENT — PAIN - FUNCTIONAL ASSESSMENT: PAIN_FUNCTIONAL_ASSESSMENT: 0-10

## 2024-11-04 ASSESSMENT — PAIN SCALES - GENERAL
PAINLEVEL_OUTOF10: 0
PAINLEVEL_OUTOF10: 0

## 2024-11-04 NOTE — ANESTHESIA PRE PROCEDURE
Department of Anesthesiology  Preprocedure Note       Name:  Jamison Robison Jr.   Age:  59 y.o.  :  1965                                          MRN:  [id number]         Date:  2024      Surgeon: Surgeon(s):  Priyank Zurita MD    Procedure: Procedure(s):  NASAL CLOSED REDUCTION    Medications prior to admission:   Prior to Admission medications    Medication Sig Start Date End Date Taking? Authorizing Provider   ondansetron (ZOFRAN) 4 MG tablet Take 2 tablets by mouth every 8 hours as needed for Nausea or Vomiting 10/31/24  Yes Priyank Zurita MD   acetaminophen (TYLENOL) 325 MG tablet Take 2 tablets by mouth every 6 hours as needed for Pain   Yes Provider, MD Lupillo   venlafaxine (EFFEXOR) 75 MG tablet Take 1 tablet by mouth daily as needed   Yes Provider, MD Lupillo   atorvastatin (LIPITOR) 10 MG tablet Take 1 tablet by mouth every morning 24  Yes Shila Antonio APRN - NP   metFORMIN (GLUCOPHAGE-XR) 500 MG extended release tablet Take 1 tablet by mouth daily (with breakfast) 24  Yes Shila Antonio APRN - NP   traZODone (DESYREL) 50 MG tablet Take 1-2 tablets by mouth nightly   Yes Provider, MD Lupillo   lisinopril (PRINIVIL;ZESTRIL) 10 MG tablet Take 1 tablet by mouth daily 24  Yes Devendra Alaniz,    pantoprazole (PROTONIX) 40 MG tablet Take 1 tablet by mouth daily 24  Yes Devendra Alaniz,    ferrous sulfate (IRON 325) 325 (65 Fe) MG tablet Take 1 tablet by mouth daily (with breakfast)   Yes Provider, MD Lupillo   furosemide (LASIX) 40 MG tablet Take 1 tablet by mouth daily as needed (fluid)  Patient taking differently: Take 1 tablet by mouth daily 3/14/24  Yes Patty Forbes MD   aspirin 81 MG EC tablet Take 1 tablet by mouth daily 24 Yes Provider, MD Lupillo   fluticasone (FLONASE) 50 MCG/ACT nasal spray 2 sprays by Nasal route as needed As needed 16  Yes Automatic Reconciliation, Ar   nadolol (CORGARD) 40 MG

## 2024-11-04 NOTE — ANESTHESIA POSTPROCEDURE EVALUATION
Department of Anesthesiology  Postprocedure Note    Patient: Jamison Robison Jr.  MRN: [id number]  YOB: 1965  Date of evaluation: 11/4/2024    Procedure Summary       Date: 11/04/24 Room / Location: [facility] OP OR 05 / [facility]    Anesthesia Start: 1251 Anesthesia Stop: 1326    Procedure: NASAL CLOSED REDUCTION (Nose) Diagnosis:       Nasal fracture      (Nasal fracture [S02.2XXA])    Surgeons: Priyank Zurita MD Responsible Provider: Priyank Rubi MD    Anesthesia Type: General ASA Status: 3            Anesthesia Type: General    Constantino Phase I: Constantino Score: 8    Constantino Phase II: Constantino Score: 10    Anesthesia Post Evaluation    Patient location during evaluation: PACU  Patient participation: complete - patient participated  Level of consciousness: awake and alert  Airway patency: patent  Nausea & Vomiting: no nausea and no vomiting  Cardiovascular status: hemodynamically stable  Respiratory status: acceptable, nonlabored ventilation and spontaneous ventilation  Hydration status: euvolemic  Comments: /66   Pulse 58   Temp 98 °F (36.7 °C) (Temporal)   Resp 15   Ht 1.803 m (5' 11\")   Wt 122.5 kg (270 lb)   SpO2 97%   BMI 37.66 kg/m²     Multimodal analgesia pain management approach  Pain management: adequate and satisfactory to patient    No notable events documented.

## 2024-11-04 NOTE — DISCHARGE INSTRUCTIONS
and have a CPAP machine, please use it for all naps and sleeping.  Please use caution when taking narcotics and any of your home medications that may cause drowsiness.  *  Please give a list of your current medications to your Primary Care Provider.  *  Please update this list whenever your medications are discontinued, doses are      changed, or new medications (including over-the-counter products) are added.  *  Please carry medication information at all times in case of emergency situations.    These are general instructions for a healthy lifestyle:  No smoking/ No tobacco products/ Avoid exposure to second hand smoke  Surgeon General's Warning:  Quitting smoking now greatly reduces serious risk to your health.  Obesity, smoking, and sedentary lifestyle greatly increases your risk for illness  A healthy diet, regular physical exercise & weight monitoring are important for maintaining a healthy lifestyle    You may be retaining fluid if you have a history of heart failure or if you experience any of the following symptoms:  Weight gain of 3 pounds or more overnight or 5 pounds in a week, increased swelling in our hands or feet or shortness of breath while lying flat in bed.  Please call your doctor as soon as you notice any of these symptoms; do not wait until your next office visit.

## 2024-11-04 NOTE — OP NOTE
Operative Note      Patient: Jamison Robison Jr.  YOB: 1965  MRN: [id number]    Date of Procedure: 11/4/2024    Preop diagnosis: Nasal bone fracture    Postop diagnosis: Nasal bone fracture    Procedure: Closed nasal reduction    Operative Surgeon: Priyank Zurita MD    Anesthesia: General    Anesthesiologist: Priyank Rubi MD    Operative findings: The upper third was deviated off to the left side with a depressed right nasal bone fracture.  Closed nasal reduction was performed and a thermoplastic splint was applied.    IV fluid: Minimal    Estimated Blood Loss (mL): Minimal    Complications: None    Specimens: None    Implants: None    Drains: None    Disposition: PACU, then home    Condition: Stable    Brief history: Mr. Robison is a 59-year-old male who suffered nasal trauma last week as he fell out of his tractor.  On exam, the upper third was severely deviated off to the left side.  The decision was made to take him to the operating room for closed nasal reduction.    Detailed Description of Procedure:   The patient was brought back to the operating room and placed on the table in a supine position.  General endotracheal anesthesia was inducted without any complications.  Once the patient was adequately sedated, nasal pledgets soaked in Afrin were placed on both nasal cavities for topical decongestion.  I then injected 1 cc of 1% lidocaine with 1-100,000 epinephrine along the right side of the nasal dorsum.  He was then sterilely prepped and draped in the usual fashion.    On exam, the upper third was severely deviated off to the left side with a depressed right nasal bone fracture.  I used the Marimar elevator and advanced it under the right nasal bone.  Next, with traction both upwards and laterally, I was able to reduce the depressed right nasal bone fracture while applying pressure to the contralateral left side.  There was minimal bleeding which was controlled using nasal pledgets soaked in

## 2024-11-06 ENCOUNTER — PATIENT MESSAGE (OUTPATIENT)
Dept: ENT CLINIC | Age: 59
End: 2024-11-06

## 2024-11-06 ENCOUNTER — TELEPHONE (OUTPATIENT)
Dept: ENT CLINIC | Age: 59
End: 2024-11-06

## 2024-11-06 NOTE — TELEPHONE ENCOUNTER
I spoke with the patient, he reported that when he coughs, he needs to engage his abdominal muscles, which causes him discomfort in the mid-chest area around the sternum. He is not experiencing any difficulty breathing and appears to be under no distress. Additionally, he inquired about the possibility of resuming his aspirin regimen.

## 2024-11-11 ENCOUNTER — OFFICE VISIT (OUTPATIENT)
Dept: ENT CLINIC | Age: 59
End: 2024-11-11

## 2024-11-11 VITALS — WEIGHT: 270.06 LBS | BODY MASS INDEX: 37.81 KG/M2 | RESPIRATION RATE: 12 BRPM | HEIGHT: 71 IN

## 2024-11-11 DIAGNOSIS — S02.2XXD CLOSED FRACTURE OF NASAL BONE WITH ROUTINE HEALING, SUBSEQUENT ENCOUNTER: Primary | ICD-10-CM

## 2024-11-11 PROCEDURE — 99024 POSTOP FOLLOW-UP VISIT: CPT | Performed by: OTOLARYNGOLOGY

## 2024-11-11 NOTE — PROGRESS NOTES
Jamison Robison Jr. is a 59 y.o. male seen today now 1 week post-op after undergoing closed nasal reduction back on 11/4/2024.  Doing great since then with no further bleeding, and he remove the nasal splint over the weekend.  He has been able to wear his glasses without difficulty, and he denies any further pain.  He can breathe well through both sides since surgery.    Resp 12   Ht 1.803 m (5' 10.98\")   Wt 122.5 kg (270 lb 1 oz)   BMI 37.68 kg/m²    -Upper third is midline with no further deviation.  No palpable bony step-offs.  No further ecchymosis.  -Septum is just slightly deviated dorsally to the right side but excellent airflow bilaterally and no septal perforations or hematomas noted.    A/P:   Diagnosis Orders   1. Closed fracture of nasal bone with routine healing, subsequent encounter          Doing great now 1 week out from his closed nasal reduction.  Everything has healed up well and he looks great on exam today.  He may start to blow his nose and return to normal activities.  RTC as needed.    Priyank Zurita MD

## (undated) DEVICE — SUT ETHLN 3-0 18IN PS1 BLK --

## (undated) DEVICE — SPONGE,NEURO,1"X3",XR,STRL,LF,10/PK: Brand: MEDLINE

## (undated) DEVICE — Device

## (undated) DEVICE — ZIMMER® STERILE DISPOSABLE TOURNIQUET CUFF PROTECTIVE SLEEVE (FOR USE WITH ZIMMER 42 IN. (107 CM) DISPOSABLE CUFF)

## (undated) DEVICE — SYRINGE, LUER SLIP, STERILE, 60ML: Brand: MEDLINE

## (undated) DEVICE — X-LARGE COTTON GLOVE: Brand: DEROYAL

## (undated) DEVICE — KENDALL RADIOLUCENT FOAM MONITORING ELECTRODE RECTANGULAR SHAPE: Brand: KENDALL

## (undated) DEVICE — 2000CC GUARDIAN II: Brand: GUARDIAN

## (undated) DEVICE — BLOCK BITE AD 60FR W/ VELC STRP ADDRESSES MOST PT AND

## (undated) DEVICE — NDL PRT INJ NSAF BLNT 18GX1.5 --

## (undated) DEVICE — (D)PREP SKN CHLRAPRP APPL 26ML -- CONVERT TO ITEM 371833

## (undated) DEVICE — CANNULA NSL ORAL AD FOR CAPNOFLEX CO2 O2 AIRLFE

## (undated) DEVICE — NEEDLE SYR 18GA L1.5IN RED PLAS HUB S STL BLNT FILL W/O

## (undated) DEVICE — 90-S, SUCTION PROBE, NON-BENDABLE, MAX CUT LEVEL 11: Brand: SERFAS ENERGY

## (undated) DEVICE — CONNECTOR TBNG OD5-7MM O2 END DISP

## (undated) DEVICE — PADDING CAST COHESIVE 4 YDX3 IN HND TEARABLE COTTON SPEC 100

## (undated) DEVICE — 1.8MM DRILL BIT WITH DEPTH MARK/QC/110MM

## (undated) DEVICE — BLADE SHV CUT MENIS AGG + 4MM --

## (undated) DEVICE — MOUTHPIECE ENDOSCP 20X27MM --

## (undated) DEVICE — TETRA-FLEX CF WOVEN LATEX FREE ELASTIC BANDAGE 6" X 11 YD: Brand: TETRA-FLEX™CF

## (undated) DEVICE — ZIMMER® STERILE DISPOSABLE TOURNIQUET CUFF WITH PROTECTIVE SLEEVE AND PLC, DUAL PORT, SINGLE BLADDER, 18 IN. (46 CM)

## (undated) DEVICE — HEAD AND NECK: Brand: MEDLINE INDUSTRIES, INC.

## (undated) DEVICE — [RESECTOR CUTTER, ARTHROSCOPIC SHAVER BLADE,  DO NOT RESTERILIZE,  DO NOT USE IF PACKAGE IS DAMAGED,  KEEP DRY,  KEEP AWAY FROM SUNLIGHT]: Brand: FORMULA

## (undated) DEVICE — STOCKINETTE TUBE 9X48 -- MEDICHOICE

## (undated) DEVICE — BANDAGE COMPR SELF ADH 5 YDX4 IN TAN STRL PREMIERPRO LF

## (undated) DEVICE — SUT ETHLN 4-0 18IN PS2 BLK --

## (undated) DEVICE — NDL SPNE QNCKE 18GX3.5IN LF --

## (undated) DEVICE — SYRINGE MED 3ML CLR PLAS STD N CTRL LUERLOCK TIP DISP

## (undated) DEVICE — SCREW BNE L16MM DIA2.4MM CORT S STL ST T8 STARDRV RECESS
Type: IMPLANTABLE DEVICE | Site: RADIUS | Status: NON-FUNCTIONAL
Removed: 2018-01-29

## (undated) DEVICE — GAUZE,SPONGE,4"X4",12PLY,WOVEN,NS,LF: Brand: MEDLINE

## (undated) DEVICE — DRAPE,ORTHOMAX,ARTHRO T ,W/ POUCH: Brand: MEDLINE

## (undated) DEVICE — SOLUTION IRRIG 3000ML 0.9% SOD CHL FLX CONT 0797208] ICU MEDICAL INC]

## (undated) DEVICE — SYR 5ML 1/5 GRAD LL NSAF LF --

## (undated) DEVICE — BLADE SHV 3.5MM TOMCAT

## (undated) DEVICE — SPLINT 1529020 10PK THERMASPLINT LARGE

## (undated) DEVICE — CONTAINER PREFIL FRMLN 40ML --

## (undated) DEVICE — MASTISOL ADHESIVE LIQ 2/3ML

## (undated) DEVICE — SUTURE MCRYL SZ 2-0 L27IN ABSRB VLT CT-2 L26MM 1/2 CIR Y333H

## (undated) DEVICE — SYR 3ML LL TIP 1/10ML GRAD --

## (undated) DEVICE — INFLOW CASSETTE TUBING, DO NOT USE IF PACKAGE IS DAMAGED: Brand: CROSSFLOW

## (undated) DEVICE — STERILE LATEX POWDER-FREE SURGICAL GLOVESWITH NITRILE COATING: Brand: PROTEXIS

## (undated) DEVICE — STRIP,CLOSURE,WOUND,MEDI-STRIP,1/2X4: Brand: MEDLINE

## (undated) DEVICE — SINGLE PORT MANIFOLD: Brand: NEPTUNE 2

## (undated) DEVICE — FORCEPS BX L240CM JAW DIA2.8MM L CAP W/ NDL MIC MESH TOOTH

## (undated) DEVICE — SALEM SUMP DUAL LUMEN STOMACH TUBE MULTI-FUNCTIONAL PORT WITH ENFIT CONNECTION: Brand: SALEM SUMP

## (undated) DEVICE — SYRINGE MED 10ML LUERLOCK TIP W/O SFTY DISP

## (undated) DEVICE — YANKAUER,BULB TIP,W/O VENT,RIGID,STERILE: Brand: MEDLINE

## (undated) DEVICE — CARDINAL HEALTH FLEXIBLE LIGHT HANDLE COVER: Brand: CARDINAL HEALTH

## (undated) DEVICE — AIRLIFE™ OXYGEN TUBING 7 FEET (2.1 M) CRUSH RESISTANT OXYGEN TUBING, VINYL TIPPED: Brand: AIRLIFE™

## (undated) DEVICE — KENDALL SCD EXPRESS SLEEVES, KNEE LENGTH, MEDIUM: Brand: KENDALL SCD

## (undated) DEVICE — STERILE HOOK LOCK LATEX FREE ELASTIC BANDAGE 4INX5YD: Brand: HOOK LOCK™

## (undated) DEVICE — DRSG AQUACEL SURG 3.5X6IN -- CONVERT TO ITEM 369227

## (undated) DEVICE — INTENDED FOR TISSUE SEPARATION, AND OTHER PROCEDURES THAT REQUIRE A SHARP SURGICAL BLADE TO PUNCTURE OR CUT.: Brand: BARD-PARKER ® STAINLESS STEEL BLADES

## (undated) DEVICE — MASK PROC O2 CAPNOGRAPHY 2

## (undated) DEVICE — LUBE JELLY FOIL PACK 1.4 OZ: Brand: MEDLINE INDUSTRIES, INC.

## (undated) DEVICE — INSTINCT PLUS ENDOSCOPIC CLIPPING DEVICE: Brand: INSTINCT

## (undated) DEVICE — GLOVE SURG SZ 8.5 L11.6IN FNGR THK12.6MIL CUF THK8.3MIL BRN

## (undated) DEVICE — TAPE CST FST SET 3INX3YD BLU --

## (undated) DEVICE — BLLN KT O RING ENDOSCP US --

## (undated) DEVICE — SOLUTION IV 1000ML 0.9% SOD CHL

## (undated) DEVICE — ZIMMER® STERILE DISPOSABLE TOURNIQUET CUFF WITH PROTECTIVE SLEEVE, DUAL PORT, SINGLE BLADDER, 34 IN. (86 CM)

## (undated) DEVICE — GLOVE ORANGE PI 7 1/2   MSG9075

## (undated) DEVICE — REM POLYHESIVE ADULT PATIENT RETURN ELECTRODE: Brand: VALLEYLAB

## (undated) DEVICE — SURGICAL PROCEDURE PACK BASIC ST FRANCIS

## (undated) DEVICE — AMD ANTIMICROBIAL GAUZE SPONGES,12 PLY USP TYPE VII, 0.2% POLYHEXAMETHYLENE BIGUANIDE HCI (PHMB): Brand: CURITY

## (undated) DEVICE — BUTTON SWITCH PENCIL BLADE ELECTRODE, HOLSTER: Brand: EDGE

## (undated) DEVICE — DRAPE,U/SHT,SPLIT,FILM,60X84,STERILE: Brand: MEDLINE

## (undated) DEVICE — GOWN,REINFORCED,POLY,AURORA,XXLARGE,STR: Brand: MEDLINE

## (undated) DEVICE — ZIMMER® STERILE DISPOSABLE TOURNIQUET CUFF WITH PLC, DUAL PORT, SINGLE BLADDER, 42 IN. (107 CM)